# Patient Record
Sex: MALE | Race: WHITE | NOT HISPANIC OR LATINO | URBAN - METROPOLITAN AREA
[De-identification: names, ages, dates, MRNs, and addresses within clinical notes are randomized per-mention and may not be internally consistent; named-entity substitution may affect disease eponyms.]

---

## 2019-06-05 ENCOUNTER — EMERGENCY (EMERGENCY)
Facility: HOSPITAL | Age: 39
LOS: 0 days | Discharge: HOME | End: 2019-06-05
Attending: EMERGENCY MEDICINE
Payer: MEDICAID

## 2019-06-05 VITALS
OXYGEN SATURATION: 99 % | DIASTOLIC BLOOD PRESSURE: 77 MMHG | TEMPERATURE: 99 F | HEART RATE: 85 BPM | RESPIRATION RATE: 17 BRPM | SYSTOLIC BLOOD PRESSURE: 158 MMHG

## 2019-06-05 VITALS
HEIGHT: 68 IN | DIASTOLIC BLOOD PRESSURE: 90 MMHG | RESPIRATION RATE: 18 BRPM | TEMPERATURE: 99 F | HEART RATE: 89 BPM | OXYGEN SATURATION: 98 % | SYSTOLIC BLOOD PRESSURE: 159 MMHG | WEIGHT: 195.11 LBS

## 2019-06-05 DIAGNOSIS — F19.19 OTHER PSYCHOACTIVE SUBSTANCE ABUSE WITH UNSPECIFIED PSYCHOACTIVE SUBSTANCE-INDUCED DISORDER: ICD-10-CM

## 2019-06-05 DIAGNOSIS — M79.602 PAIN IN LEFT ARM: ICD-10-CM

## 2019-06-05 DIAGNOSIS — L03.114 CELLULITIS OF LEFT UPPER LIMB: ICD-10-CM

## 2019-06-05 LAB
ALBUMIN SERPL ELPH-MCNC: 2.7 G/DL — LOW (ref 3.3–5)
ALP SERPL-CCNC: 320 U/L — HIGH (ref 40–120)
ALT FLD-CCNC: 58 U/L — SIGNIFICANT CHANGE UP (ref 12–78)
ANION GAP SERPL CALC-SCNC: 9 MMOL/L — SIGNIFICANT CHANGE UP (ref 5–17)
APTT BLD: 28.8 SEC — SIGNIFICANT CHANGE UP (ref 27.5–36.3)
AST SERPL-CCNC: 25 U/L — SIGNIFICANT CHANGE UP (ref 15–37)
BASOPHILS # BLD AUTO: 0.02 K/UL — SIGNIFICANT CHANGE UP (ref 0–0.2)
BASOPHILS NFR BLD AUTO: 0.2 % — SIGNIFICANT CHANGE UP (ref 0–2)
BILIRUB SERPL-MCNC: 1 MG/DL — SIGNIFICANT CHANGE UP (ref 0.2–1.2)
BUN SERPL-MCNC: 40 MG/DL — HIGH (ref 7–23)
CALCIUM SERPL-MCNC: 8.3 MG/DL — LOW (ref 8.5–10.1)
CHLORIDE SERPL-SCNC: 113 MMOL/L — HIGH (ref 96–108)
CO2 SERPL-SCNC: 21 MMOL/L — LOW (ref 22–31)
CREAT SERPL-MCNC: 1.37 MG/DL — HIGH (ref 0.5–1.3)
CRP SERPL-MCNC: 10.28 MG/DL — HIGH (ref 0–0.4)
EOSINOPHIL # BLD AUTO: 0.02 K/UL — SIGNIFICANT CHANGE UP (ref 0–0.5)
EOSINOPHIL NFR BLD AUTO: 0.2 % — SIGNIFICANT CHANGE UP (ref 0–6)
ERYTHROCYTE [SEDIMENTATION RATE] IN BLOOD: 51 MM/HR — HIGH (ref 0–15)
GLUCOSE SERPL-MCNC: 112 MG/DL — HIGH (ref 70–99)
HCT VFR BLD CALC: 27.5 % — LOW (ref 39–50)
HGB BLD-MCNC: 9 G/DL — LOW (ref 13–17)
IMM GRANULOCYTES NFR BLD AUTO: 0.4 % — SIGNIFICANT CHANGE UP (ref 0–1.5)
INR BLD: 1.35 RATIO — HIGH (ref 0.88–1.16)
LACTATE SERPL-SCNC: 0.7 MMOL/L — SIGNIFICANT CHANGE UP (ref 0.7–2)
LYMPHOCYTES # BLD AUTO: 0.67 K/UL — LOW (ref 1–3.3)
LYMPHOCYTES # BLD AUTO: 7.4 % — LOW (ref 13–44)
MCHC RBC-ENTMCNC: 26.5 PG — LOW (ref 27–34)
MCHC RBC-ENTMCNC: 32.7 GM/DL — SIGNIFICANT CHANGE UP (ref 32–36)
MCV RBC AUTO: 81.1 FL — SIGNIFICANT CHANGE UP (ref 80–100)
MONOCYTES # BLD AUTO: 0.8 K/UL — SIGNIFICANT CHANGE UP (ref 0–0.9)
MONOCYTES NFR BLD AUTO: 8.9 % — SIGNIFICANT CHANGE UP (ref 2–14)
NEUTROPHILS # BLD AUTO: 7.48 K/UL — HIGH (ref 1.8–7.4)
NEUTROPHILS NFR BLD AUTO: 82.9 % — HIGH (ref 43–77)
NRBC # BLD: 0 /100 WBCS — SIGNIFICANT CHANGE UP (ref 0–0)
PLATELET # BLD AUTO: 180 K/UL — SIGNIFICANT CHANGE UP (ref 150–400)
POTASSIUM SERPL-MCNC: 4.3 MMOL/L — SIGNIFICANT CHANGE UP (ref 3.5–5.3)
POTASSIUM SERPL-SCNC: 4.3 MMOL/L — SIGNIFICANT CHANGE UP (ref 3.5–5.3)
PROT SERPL-MCNC: 6.4 GM/DL — SIGNIFICANT CHANGE UP (ref 6–8.3)
PROTHROM AB SERPL-ACNC: 15.3 SEC — HIGH (ref 10–12.9)
RBC # BLD: 3.39 M/UL — LOW (ref 4.2–5.8)
RBC # FLD: 12.3 % — SIGNIFICANT CHANGE UP (ref 10.3–14.5)
SODIUM SERPL-SCNC: 143 MMOL/L — SIGNIFICANT CHANGE UP (ref 135–145)
WBC # BLD: 9.03 K/UL — SIGNIFICANT CHANGE UP (ref 3.8–10.5)
WBC # FLD AUTO: 9.03 K/UL — SIGNIFICANT CHANGE UP (ref 3.8–10.5)

## 2019-06-05 PROCEDURE — 99284 EMERGENCY DEPT VISIT MOD MDM: CPT

## 2019-06-05 PROCEDURE — 73090 X-RAY EXAM OF FOREARM: CPT | Mod: 26,LT

## 2019-06-05 PROCEDURE — 73201 CT UPPER EXTREMITY W/DYE: CPT | Mod: 26,LT

## 2019-06-05 PROCEDURE — 93010 ELECTROCARDIOGRAM REPORT: CPT

## 2019-06-05 PROCEDURE — 73080 X-RAY EXAM OF ELBOW: CPT | Mod: 26,LT

## 2019-06-05 RX ORDER — PIPERACILLIN AND TAZOBACTAM 4; .5 G/20ML; G/20ML
3.38 INJECTION, POWDER, LYOPHILIZED, FOR SOLUTION INTRAVENOUS ONCE
Refills: 0 | Status: COMPLETED | OUTPATIENT
Start: 2019-06-05 | End: 2019-06-05

## 2019-06-05 RX ORDER — VANCOMYCIN HCL 1 G
1000 VIAL (EA) INTRAVENOUS ONCE
Refills: 0 | Status: COMPLETED | OUTPATIENT
Start: 2019-06-05 | End: 2019-06-05

## 2019-06-05 RX ORDER — SODIUM CHLORIDE 9 MG/ML
2000 INJECTION INTRAMUSCULAR; INTRAVENOUS; SUBCUTANEOUS ONCE
Refills: 0 | Status: COMPLETED | OUTPATIENT
Start: 2019-06-05 | End: 2019-06-05

## 2019-06-05 RX ORDER — TETANUS TOXOID, REDUCED DIPHTHERIA TOXOID AND ACELLULAR PERTUSSIS VACCINE, ADSORBED 5; 2.5; 8; 8; 2.5 [IU]/.5ML; [IU]/.5ML; UG/.5ML; UG/.5ML; UG/.5ML
0.5 SUSPENSION INTRAMUSCULAR ONCE
Refills: 0 | Status: COMPLETED | OUTPATIENT
Start: 2019-06-05 | End: 2019-06-05

## 2019-06-05 RX ORDER — AZTREONAM 2 G
1 VIAL (EA) INJECTION
Qty: 20 | Refills: 0
Start: 2019-06-05 | End: 2019-06-14

## 2019-06-05 RX ADMIN — PIPERACILLIN AND TAZOBACTAM 200 GRAM(S): 4; .5 INJECTION, POWDER, LYOPHILIZED, FOR SOLUTION INTRAVENOUS at 10:15

## 2019-06-05 RX ADMIN — SODIUM CHLORIDE 2000 MILLILITER(S): 9 INJECTION INTRAMUSCULAR; INTRAVENOUS; SUBCUTANEOUS at 10:18

## 2019-06-05 RX ADMIN — Medication 250 MILLIGRAM(S): at 11:16

## 2019-06-05 NOTE — ED PROVIDER NOTE - CONSTITUTIONAL, MLM
normal... Well appearing, well nourished, awake, alert, oriented to person, place, time/situation and in no apparent distress. Speaking in clear full sentences  no nasal flaring no shoulders retractions smiling appears comfortable sitting up in the stretcher

## 2019-06-05 NOTE — ED PROVIDER NOTE - PMH
No pertinent past medical history <<----- Click to add NO pertinent Past Medical History Substance abuse

## 2019-06-05 NOTE — ED ADULT NURSE NOTE - NSIMPLEMENTINTERV_GEN_ALL_ED
Implemented All Universal Safety Interventions:  Dawn to call system. Call bell, personal items and telephone within reach. Instruct patient to call for assistance. Room bathroom lighting operational. Non-slip footwear when patient is off stretcher. Physically safe environment: no spills, clutter or unnecessary equipment. Stretcher in lowest position, wheels locked, appropriate side rails in place.

## 2019-06-05 NOTE — ED ADULT NURSE NOTE - EXPLANATION OF PATIENT'S REASON FOR LEAVING
pt request to return another day for personal issues. Pt states he will return for IV antibiotics and admission.

## 2019-06-05 NOTE — ED ADULT NURSE NOTE - OBJECTIVE STATEMENT
pt c/o of left arm pain and swelling. pt states injured at work 3 days ago. pt states  he was working on a car when the powers collapsed on his arm. 1  cm puncture wound to left arm, swollen red, warm to touch, painful at site. pt denies fever chills.

## 2019-06-05 NOTE — ED PROVIDER NOTE - PHYSICAL EXAMINATION
Skin - +induration swelling erythematous to left proximal ventral aspect of the left fore arm 10 cm by 15 cm

## 2019-06-05 NOTE — ED PROVIDER NOTE - PROGRESS NOTE DETAILS
Pt now admits to use of iv heroin two bags three days ago.  Last use was three days ago. Pt has been alert and oriented x 3 pt refused to be admitted for left arm cellulitis and abscess. Pt sts he rather take oral antibiotics and will come back if symptoms persist or worsen. Pt is explained the importance to be admitted and risks ( including amputation and death ) of leaving hospital against resistance. Pt is fully capable of making medical decisions. Pt is advised to take oral antibiotics.

## 2019-06-05 NOTE — ED PROVIDER NOTE - OBJECTIVE STATEMENT
39 years old male walked in c/o pain swelling and redness to the left fore arm after being punctured by a clifford nail Last tetanus was 6 years ago. Pt dneie 39 years old male walked in c/o pain swelling and redness to the left fore arm after being punctured by a clifford nail Last tetanus was 6 years ago. Pt denies headache, dizziness, blurred visions, light sensitivities, focal/distal weakness or numbness, of left arm, cough, sob, chest pain, neck/back pain, nausea, vomiting, fever, chills, abd pain, dysuria or irregular bowel movements. Pt sts he has no past medical hx.

## 2019-06-06 ENCOUNTER — INPATIENT (INPATIENT)
Facility: HOSPITAL | Age: 39
LOS: 24 days | Discharge: HOME | End: 2019-07-01
Attending: INTERNAL MEDICINE | Admitting: INTERNAL MEDICINE
Payer: MEDICAID

## 2019-06-06 VITALS
WEIGHT: 195.11 LBS | OXYGEN SATURATION: 98 % | DIASTOLIC BLOOD PRESSURE: 99 MMHG | HEIGHT: 69 IN | SYSTOLIC BLOOD PRESSURE: 160 MMHG | HEART RATE: 111 BPM | TEMPERATURE: 98 F | RESPIRATION RATE: 16 BRPM

## 2019-06-06 DIAGNOSIS — L02.419 CUTANEOUS ABSCESS OF LIMB, UNSPECIFIED: ICD-10-CM

## 2019-06-06 DIAGNOSIS — L03.114 CELLULITIS OF LEFT UPPER LIMB: ICD-10-CM

## 2019-06-06 DIAGNOSIS — F17.200 NICOTINE DEPENDENCE, UNSPECIFIED, UNCOMPLICATED: ICD-10-CM

## 2019-06-06 DIAGNOSIS — F19.10 OTHER PSYCHOACTIVE SUBSTANCE ABUSE, UNCOMPLICATED: ICD-10-CM

## 2019-06-06 LAB
ALBUMIN SERPL ELPH-MCNC: 2.6 G/DL — LOW (ref 3.3–5)
ALP SERPL-CCNC: 354 U/L — HIGH (ref 40–120)
ALT FLD-CCNC: 47 U/L — SIGNIFICANT CHANGE UP (ref 12–78)
AMPHET UR-MCNC: NEGATIVE — SIGNIFICANT CHANGE UP
ANION GAP SERPL CALC-SCNC: 9 MMOL/L — SIGNIFICANT CHANGE UP (ref 5–17)
APPEARANCE UR: ABNORMAL
APTT BLD: 20.2 SEC — LOW (ref 28.5–37)
AST SERPL-CCNC: 21 U/L — SIGNIFICANT CHANGE UP (ref 15–37)
BACTERIA # UR AUTO: ABNORMAL
BARBITURATES UR SCN-MCNC: NEGATIVE — SIGNIFICANT CHANGE UP
BASOPHILS # BLD AUTO: 0 K/UL — SIGNIFICANT CHANGE UP (ref 0–0.2)
BASOPHILS NFR BLD AUTO: 0 % — SIGNIFICANT CHANGE UP (ref 0–2)
BENZODIAZ UR-MCNC: NEGATIVE — SIGNIFICANT CHANGE UP
BILIRUB SERPL-MCNC: 0.9 MG/DL — SIGNIFICANT CHANGE UP (ref 0.2–1.2)
BILIRUB UR-MCNC: ABNORMAL
BUN SERPL-MCNC: 36 MG/DL — HIGH (ref 7–23)
CALCIUM SERPL-MCNC: 8.7 MG/DL — SIGNIFICANT CHANGE UP (ref 8.5–10.1)
CHLORIDE SERPL-SCNC: 116 MMOL/L — HIGH (ref 96–108)
CO2 SERPL-SCNC: 18 MMOL/L — LOW (ref 22–31)
COCAINE METAB.OTHER UR-MCNC: POSITIVE — SIGNIFICANT CHANGE UP
COLOR SPEC: YELLOW — SIGNIFICANT CHANGE UP
CREAT SERPL-MCNC: 1.36 MG/DL — HIGH (ref 0.5–1.3)
DIFF PNL FLD: ABNORMAL
EOSINOPHIL # BLD AUTO: 0.17 K/UL — SIGNIFICANT CHANGE UP (ref 0–0.5)
EOSINOPHIL NFR BLD AUTO: 2 % — SIGNIFICANT CHANGE UP (ref 0–6)
ETHANOL SERPL-MCNC: <10 MG/DL — SIGNIFICANT CHANGE UP (ref 0–10)
GLUCOSE SERPL-MCNC: 119 MG/DL — HIGH (ref 70–99)
GLUCOSE UR QL: NEGATIVE MG/DL — SIGNIFICANT CHANGE UP
HCT VFR BLD CALC: 27.5 % — LOW (ref 39–50)
HGB BLD-MCNC: 9.1 G/DL — LOW (ref 13–17)
INR BLD: 1.26 RATIO — HIGH (ref 0.88–1.16)
KETONES UR-MCNC: ABNORMAL
LACTATE SERPL-SCNC: 1.1 MMOL/L — SIGNIFICANT CHANGE UP (ref 0.7–2)
LEUKOCYTE ESTERASE UR-ACNC: ABNORMAL
LYMPHOCYTES # BLD AUTO: 0.92 K/UL — LOW (ref 1–3.3)
LYMPHOCYTES # BLD AUTO: 11 % — LOW (ref 13–44)
MANUAL SMEAR VERIFICATION: SIGNIFICANT CHANGE UP
MCHC RBC-ENTMCNC: 26.7 PG — LOW (ref 27–34)
MCHC RBC-ENTMCNC: 33.1 GM/DL — SIGNIFICANT CHANGE UP (ref 32–36)
MCV RBC AUTO: 80.6 FL — SIGNIFICANT CHANGE UP (ref 80–100)
METHADONE UR-MCNC: NEGATIVE — SIGNIFICANT CHANGE UP
MONOCYTES # BLD AUTO: 0.08 K/UL — SIGNIFICANT CHANGE UP (ref 0–0.9)
MONOCYTES NFR BLD AUTO: 1 % — LOW (ref 2–14)
NEUTROPHILS # BLD AUTO: 7.16 K/UL — SIGNIFICANT CHANGE UP (ref 1.8–7.4)
NEUTROPHILS NFR BLD AUTO: 86 % — HIGH (ref 43–77)
NITRITE UR-MCNC: NEGATIVE — SIGNIFICANT CHANGE UP
NRBC # BLD: 0 /100 — SIGNIFICANT CHANGE UP (ref 0–0)
NRBC # BLD: SIGNIFICANT CHANGE UP /100 WBCS (ref 0–0)
OPIATES UR-MCNC: POSITIVE — SIGNIFICANT CHANGE UP
PCP SPEC-MCNC: SIGNIFICANT CHANGE UP
PCP UR-MCNC: NEGATIVE — SIGNIFICANT CHANGE UP
PH UR: 5 — SIGNIFICANT CHANGE UP (ref 5–8)
PLAT MORPH BLD: NORMAL — SIGNIFICANT CHANGE UP
PLATELET # BLD AUTO: 171 K/UL — SIGNIFICANT CHANGE UP (ref 150–400)
POTASSIUM SERPL-MCNC: 4.4 MMOL/L — SIGNIFICANT CHANGE UP (ref 3.5–5.3)
POTASSIUM SERPL-SCNC: 4.4 MMOL/L — SIGNIFICANT CHANGE UP (ref 3.5–5.3)
PROT SERPL-MCNC: 6.4 GM/DL — SIGNIFICANT CHANGE UP (ref 6–8.3)
PROT UR-MCNC: 100 MG/DL
PROTHROM AB SERPL-ACNC: 14.2 SEC — HIGH (ref 10–12.9)
RBC # BLD: 3.41 M/UL — LOW (ref 4.2–5.8)
RBC # FLD: 12.8 % — SIGNIFICANT CHANGE UP (ref 10.3–14.5)
RBC BLD AUTO: NORMAL — SIGNIFICANT CHANGE UP
RBC CASTS # UR COMP ASSIST: ABNORMAL /HPF (ref 0–4)
SODIUM SERPL-SCNC: 143 MMOL/L — SIGNIFICANT CHANGE UP (ref 135–145)
SP GR SPEC: 1.01 — SIGNIFICANT CHANGE UP (ref 1.01–1.02)
THC UR QL: POSITIVE — SIGNIFICANT CHANGE UP
UROBILINOGEN FLD QL: 8 MG/DL
WBC # BLD: 8.33 K/UL — SIGNIFICANT CHANGE UP (ref 3.8–10.5)
WBC # FLD AUTO: 8.33 K/UL — SIGNIFICANT CHANGE UP (ref 3.8–10.5)
WBC UR QL: ABNORMAL

## 2019-06-06 PROCEDURE — 93010 ELECTROCARDIOGRAM REPORT: CPT

## 2019-06-06 PROCEDURE — 93971 EXTREMITY STUDY: CPT | Mod: 26,LT

## 2019-06-06 PROCEDURE — 99223 1ST HOSP IP/OBS HIGH 75: CPT

## 2019-06-06 PROCEDURE — 99285 EMERGENCY DEPT VISIT HI MDM: CPT

## 2019-06-06 PROCEDURE — 88304 TISSUE EXAM BY PATHOLOGIST: CPT | Mod: 26

## 2019-06-06 RX ORDER — NICOTINE POLACRILEX 2 MG
1 GUM BUCCAL DAILY
Refills: 0 | Status: DISCONTINUED | OUTPATIENT
Start: 2019-06-06 | End: 2019-06-07

## 2019-06-06 RX ORDER — PIPERACILLIN AND TAZOBACTAM 4; .5 G/20ML; G/20ML
3.38 INJECTION, POWDER, LYOPHILIZED, FOR SOLUTION INTRAVENOUS EVERY 8 HOURS
Refills: 0 | Status: DISCONTINUED | OUTPATIENT
Start: 2019-06-06 | End: 2019-06-07

## 2019-06-06 RX ORDER — VANCOMYCIN HCL 1 G
1000 VIAL (EA) INTRAVENOUS ONCE
Refills: 0 | Status: COMPLETED | OUTPATIENT
Start: 2019-06-06 | End: 2019-06-06

## 2019-06-06 RX ORDER — ACETAMINOPHEN 500 MG
650 TABLET ORAL EVERY 6 HOURS
Refills: 0 | Status: DISCONTINUED | OUTPATIENT
Start: 2019-06-06 | End: 2019-06-07

## 2019-06-06 RX ORDER — VANCOMYCIN HCL 1 G
1000 VIAL (EA) INTRAVENOUS EVERY 12 HOURS
Refills: 0 | Status: DISCONTINUED | OUTPATIENT
Start: 2019-06-06 | End: 2019-06-07

## 2019-06-06 RX ORDER — SODIUM CHLORIDE 9 MG/ML
2000 INJECTION INTRAMUSCULAR; INTRAVENOUS; SUBCUTANEOUS ONCE
Refills: 0 | Status: COMPLETED | OUTPATIENT
Start: 2019-06-06 | End: 2019-06-06

## 2019-06-06 RX ORDER — PIPERACILLIN AND TAZOBACTAM 4; .5 G/20ML; G/20ML
3.38 INJECTION, POWDER, LYOPHILIZED, FOR SOLUTION INTRAVENOUS ONCE
Refills: 0 | Status: COMPLETED | OUTPATIENT
Start: 2019-06-06 | End: 2019-06-06

## 2019-06-06 RX ADMIN — Medication 1 PATCH: at 19:40

## 2019-06-06 RX ADMIN — PIPERACILLIN AND TAZOBACTAM 200 GRAM(S): 4; .5 INJECTION, POWDER, LYOPHILIZED, FOR SOLUTION INTRAVENOUS at 10:00

## 2019-06-06 RX ADMIN — SODIUM CHLORIDE 1000 MILLILITER(S): 9 INJECTION INTRAMUSCULAR; INTRAVENOUS; SUBCUTANEOUS at 09:40

## 2019-06-06 RX ADMIN — PIPERACILLIN AND TAZOBACTAM 3.38 GRAM(S): 4; .5 INJECTION, POWDER, LYOPHILIZED, FOR SOLUTION INTRAVENOUS at 11:05

## 2019-06-06 RX ADMIN — Medication 1 PATCH: at 13:46

## 2019-06-06 RX ADMIN — PIPERACILLIN AND TAZOBACTAM 25 GRAM(S): 4; .5 INJECTION, POWDER, LYOPHILIZED, FOR SOLUTION INTRAVENOUS at 19:52

## 2019-06-06 RX ADMIN — Medication 250 MILLIGRAM(S): at 11:20

## 2019-06-06 NOTE — ED ADULT TRIAGE NOTE - CHIEF COMPLAINT QUOTE
as per pt " I was here yesterday and d/c on abx for a left forearm wound. the wound is now 7cm by 8cm with redness, warmth, and firm to touch and oozing moderately of pinkish brown discharge.

## 2019-06-06 NOTE — H&P ADULT - ASSESSMENT
39 years old male with history of heroine abuse with cellultis failed after one days of outpatient oral antibiotics for admission for at least two days for intravenous antibiotics   IMPROVE VTE Individual Risk Assessment    RISK                                                          Points  [] Previous VTE                                           3  [] Thrombophilia                                        2  [] Lower limb paralysis                              2   [] Current Cancer                                       2   [] Immobilization > 24 hrs                        1  [] ICU/CCU stay > 24 hours                       1  [] Age > 60                                                   1    IMPROVE VTE Score:0

## 2019-06-06 NOTE — ED ADULT NURSE REASSESSMENT NOTE - NS ED NURSE REASSESS COMMENT FT1
Report given to receiving RN Sangeeta, pts history, current condition and reason for admission discussed, safety concerns addressed and reviewed, pt currently in stable condition, IV flushes for patency and site shows no signs or symptoms of infiltrate, dressing is clean dry and intact, pt is aware of plan of care. Pt education deemed successful at time of report after patient demonstrates successful teach back for proficiency.

## 2019-06-06 NOTE — ED ADULT NURSE NOTE - OBJECTIVE STATEMENT
Patient c/o of left forearm wound swelling with erythema that started Sunday and progressively became worse.  Patient denies pain.

## 2019-06-06 NOTE — H&P ADULT - PROBLEM SELECTOR PLAN 3
no withdrawal symptoms-  patient wants no discussion of this issue with anyone else including family

## 2019-06-06 NOTE — H&P ADULT - NSHPPHYSICALEXAM_GEN_ALL_CORE
ICU Vital Signs Last 24 Hrs  T(C): 37.1 (06 Jun 2019 11:25), Max: 37.2 (05 Jun 2019 13:46)  T(F): 98.7 (06 Jun 2019 11:25), Max: 99 (05 Jun 2019 13:46)  HR: 88 (06 Jun 2019 11:25) (85 - 111)  BP: 170/86 (06 Jun 2019 11:25) (158/77 - 170/86)  BP(mean): --  ABP: --  ABP(mean): --  RR: 18 (06 Jun 2019 11:25) (16 - 18)  SpO2: 99% (06 Jun 2019 11:25) (98% - 99%)  GENERAL: NAD well-developed  HEAD:  Atraumatic, Normocephalic  EYES: EOMI, PERRLA, conjunctiva and sclera clear  ENMT: No tonsillar erythema, exudates, or enlargement; Moist mucous membranes, Good dentition, No lesions  NECK: Supple, No JVD, Normal thyroid  NERVOUS SYSTEM:  Alert & Oriented X3, Good concentration; Motor Strength 5/5 B/L upper and lower extremities; DTRs 2+ intact and symmetric  CHEST/LUNG: Clear to percussion bilaterally; No rales, rhonchi, wheezing, or rubs  HEART: Regular rate and rhythm; No murmurs, rubs, or gallops  ABDOMEN: Soft, Nontender, Nondistended; Bowel sounds present  EXTREMITIES:  2+ Peripheral Pulses, No clubbing, cyanosis, or edema  LYMPH: No lymphadenopathy   SKIN: No rashes or lesions ICU Vital Signs Last 24 Hrs  T(C): 37.1 (06 Jun 2019 11:25), Max: 37.2 (05 Jun 2019 13:46)  T(F): 98.7 (06 Jun 2019 11:25), Max: 99 (05 Jun 2019 13:46)  HR: 88 (06 Jun 2019 11:25) (85 - 111)  BP: 170/86 (06 Jun 2019 11:25) (158/77 - 170/86)  BP(mean): --  ABP: --  ABP(mean): --  RR: 18 (06 Jun 2019 11:25) (16 - 18)  SpO2: 99% (06 Jun 2019 11:25) (98% - 99%)  GENERAL: NAD well-developed  HEAD:  Atraumatic, Normocephalic  EYES: EOMI, PERRLA, conjunctiva and sclera clear  ENMT: No tonsillar erythema, exudates, or enlargement; Moist mucous membranes, Good dentition, No lesions  NECK: Supple, No JVD, Normal thyroid  NERVOUS SYSTEM:  Alert & Oriented X3, Good concentration; Motor Strength 5/5 B/L upper and lower extremities; DTRs 2+ intact and symmetric  CHEST/LUNG: Clear to percussion bilaterally; No rales, rhonchi, wheezing, or rubs  HEART: Regular rate and rhythm; No murmurs, rubs, or gallops  ABDOMEN: Soft, Nontender, Nondistended; Bowel sounds present  EXTREMITIES:  large tense swelling with small pustule area to left forearm   LYMPH: No lymphadenopathy   SKIN: No rashes or lesions

## 2019-06-06 NOTE — ED PROVIDER NOTE - OBJECTIVE STATEMENT
39 year old male presents today for follow up, pt presents with a draining wound on his left forearm, pt was seen yesterday but did not want to stay, he did want to try oral antibiotics, he does admit to heroin use prior to his symptoms, he did have fever on Saturday and Sunday then swelling which worsened, he denies tenderness +drainage

## 2019-06-06 NOTE — H&P ADULT - NSHPREVIEWOFSYSTEMS_GEN_ALL_CORE
CONSTITUTIONAL: No fever, weight loss, or fatigue  EYES: No eye pain, visual disturbances, or discharge  ENMT:  No difficulty hearing, tinnitus, vertigo; No sinus or throat pain  NECK: No pain or stiffness  RESPIRATORY: No cough, wheezing, chills or hemoptysis; No shortness of breath  CARDIOVASCULAR: No chest pain, palpitations, dizziness, or leg swelling  GASTROINTESTINAL: No abdominal or epigastric pain. No nausea, vomiting, or hematemesis; No diarrhea or constipation. No melena or hematochezia.  GENITOURINARY: No dysuria, frequency, hematuria, or incontinence  NEUROLOGICAL: No headaches, memory loss, loss of strength, numbness, or tremors  SKIN: No itching, burning, rashes, or lesions   LYMPH NODES: No enlarged glands  ENDOCRINE: No heat or cold intolerance; No hair loss  MUSCULOSKELETAL: No joint pain or swelling; No muscle, back, or extremity pain  PSYCHIATRIC: No depression, anxiety, mood swings, or difficulty sleeping  HEME/LYMPH: No easy bruising, or bleeding gums  ALLERGY AND IMMUNOLOGIC: No hives or eczema CONSTITUTIONAL:  fever two days ago no  weight loss, or fatigue  EYES: No eye pain, visual disturbances, or discharge  ENMT:  No difficulty hearing, tinnitus, vertigo; No sinus or throat pain  NECK: No pain or stiffness  RESPIRATORY: No cough, wheezing, chills or hemoptysis; No shortness of breath  CARDIOVASCULAR: No chest pain, palpitations, dizziness, or leg swelling  GASTROINTESTINAL: No abdominal or epigastric pain. No nausea, vomiting, or hematemesis; No diarrhea or constipation. No melena or hematochezia.  GENITOURINARY: No dysuria, frequency, hematuria, or incontinence  NEUROLOGICAL: No headaches, memory loss, loss of strength, numbness, or tremors  SKIN: No itching, burning, rashes, or lesions   LYMPH NODES: No enlarged glands  ENDOCRINE: No heat or cold intolerance; No hair loss  MUSCULOSKELETAL: swelling to left forearm   PSYCHIATRIC: No depression, anxiety, mood swings, or difficulty sleeping  HEME/LYMPH: No easy bruising, or bleeding gums  ALLERGY AND IMMUNOLOGIC: No hives or eczema

## 2019-06-06 NOTE — ED PROVIDER NOTE - CARE PLAN
Principal Discharge DX:	Abscess of left axilla Principal Discharge DX:	Abscess of upper limb  Secondary Diagnosis:	Cellulitis of left upper extremity

## 2019-06-06 NOTE — CONSULT NOTE ADULT - SUBJECTIVE AND OBJECTIVE BOX
CC:  Patient is a 39y old  Male who presents with a chief complaint of cellultis (2019 12:25)      HPI:  39 year old male presents today for follow up, pt presents with a draining wound on his left forearm, pt was seen yesterday but did not want to stay, he did want to try oral antibiotics, he does admit to heroin use prior to his symptoms, he did have fever on Saturday and  then swelling which worsened, he denies tenderness +drainage of pus today - denies local pain - OF note -## he does not want anybody especially his fiance to know about his drug use## (2019 12:25)      PAST MEDICAL & SURGICAL HISTORY:  Substance abuse  No significant past surgical history      Allergies    No Known Allergies    Intolerances        SOCIAL HISTORY          Smoking: Yes [ ]  No [ ]   ______pk yrs          ETOH  Yes [ ]  No [ ]  Social [ ]          DRUGS:  Yes [ ]  No [ ]  if so what______________    FAMILY HISTORY:  No pertinent family history in first degree relatives      MEDICATIONS  (STANDING):  nicotine - 21 mG/24Hr(s) Patch 1 patch Transdermal daily  piperacillin/tazobactam IVPB. 3.375 Gram(s) IV Intermittent every 8 hours  vancomycin  IVPB 1000 milliGRAM(s) IV Intermittent every 12 hours    MEDICATIONS  (PRN):  acetaminophen   Tablet .. 650 milliGRAM(s) Oral every 6 hours PRN Temp greater or equal to 38C (100.4F), Mild Pain (1 - 3)         Review of systems:  General:  no fever or chills  Pulmonary:  no SOB  Cardiac:  denies chest pain, palpitations  GI:  no nausea, vomitting, or abddominal pain  :  no increase frequency, or burning  Heme:  no easy bruising with minor trauma  Musculoskeletal:  No history of back pain or trauma  Skin:  no history of rashes, injury, trauma  Neuro:  no weakness         Vital Signs Last 24 Hrs  T(C): 36.7 (2019 18:34), Max: 37.1 (2019 11:25)  T(F): 98 (2019 18:34), Max: 98.7 (2019 11:25)  HR: 94 (2019 18:34) (66 - 111)  BP: 165/96 (2019 18:34) (152/92 - 170/86)  BP(mean): --  RR: 17 (2019 18:34) (16 - 19)  SpO2: 94% (2019 18:34) (94% - 99%)    Physical Exam:    General:    no distress  Extremities:    Skin:          LABS:                        9.1    8.33  )-----------( 171      ( 2019 10:00 )             27.5         143  |  116<H>  |  36<H>  ----------------------------<  119<H>  4.4   |  18<L>  |  1.36<H>    Ca    8.7      2019 10:00    TPro  6.4  /  Alb  2.6<L>  /  TBili  0.9  /  DBili  x   /  AST  21  /  ALT  47  /  AlkPhos  354<H>      PT/INR - ( 2019 10:00 )   PT: 14.2 sec;   INR: 1.26 ratio         PTT - ( 2019 10:00 )  PTT:20.2 sec  Urinalysis Basic - ( 2019 18:37 )    Color: Yellow / Appearance: Slightly Turbid / S.015 / pH: x  Gluc: x / Ketone: Trace  / Bili: Small / Urobili: 8 mg/dL   Blood: x / Protein: 100 mg/dL / Nitrite: Negative   Leuk Esterase: Small / RBC: 25-50 /HPF / WBC 26-50   Sq Epi: x / Non Sq Epi: x / Bacteria: Many        RADIOLOGY & ADDITIONAL STUDIES:  EXAM:  US DPLX [facility] EXT VEINS LTD LT                            PROCEDURE DATE:  2019          INTERPRETATION:  CLINICAL INFORMATION: Left arm swelling    COMPARISON: None available.    TECHNIQUE: Duplex sonography of the LEFT UPPER extremity with color and   spectral Doppler, with and without compression.      FINDINGS:    The left internal jugular, subclavian, axillary and brachial veins are   patent and compressible where applicable.  The basilic and cephalic veins   (superficial veins)are patent and without thrombus.     Doppler examination shows normal spontaneous and phasic flow.    IMPRESSION:     No evidence of left upper extremity deep venous thrombosis.                    KATIE PRINCE M.D., ATTENDING RADIOLOGIST  This document has been electronically signed. 2019 10:41AM  PROCEDURE DATE:  2019          INTERPRETATION:    CT OF THE LEFT ELBOW WITH CONTRAST.    CLINICAL INFORMATION: Left proximal forearm swelling. Evaluate for   abscess.  TECHNIQUE: Axial CT images were obtained of the left elbow and proximal   forearm with coronal and sagittal reconstructions. 80 cc of Omnipaque 350   was administered intravenously.    FINDINGS:    Within the volar subcutaneous soft tissues at the level of the proximal   forearm and interposed between the brachioradialis muscle and the   pronator teres there is a septated peripherally enhancing fluid   collection that communicates from the skin to the superficial fascial   plane of the above-described musculature consistent with a soft tissue   abscess. This measures 3.7 x 4.0 in cross-section and approximately 6 cm   in long axis. There is adjacent circumferential subcutaneous soft tissue   edema and skin thickening with edema. Prominent posterior subcutaneous   soft tissue edema is seen at the elbow and distal humerus. Although there   is perimuscular soft tissue edema, there is no discrete intramuscular   abscess.    The osseous structures are intact without CT evidence of osteomyelitis.   The elbow articulations are normal in appearance. There is no elbow joint   effusion.    IMPRESSION:    Extensive circumferential subcutaneous soft tissue edema with a   peripherally enhancing and septated fluid collection within the   subcutaneous soft tissues at the volar aspect of the proximal forearm   interposed between the brachioradialis muscle and the pronator teres   consistent with a soft tissue abscess.                  Risks, benefits, and alternatives to treatment discussed. All questions answered with understanding.    Procedure Performed:  (  )Yes     (  )No  Name of Procedure:      [  ]Debridement     [  ]I&D    [  ]Laceration Repair     [  ]Other:  (  )partial thickness     (  )full thickness     (  )subcutaneous     (  )muscle/tendon     (  )bone  (  )sharp     (  )surgical CC:  Patient is a 39y old  Male who presents with a chief complaint of cellultis (2019 12:25)  RHD    HPI:  39 year old male presents today for follow up, pt presents with a draining wound on his left forearm, pt was seen yesterday but did not want to stay, he did want to try oral antibiotics, he does admit to heroin use prior to his symptoms, he did have fever on Saturday and  then swelling which worsened, he denies tenderness +drainage of pus today - denies local pain - OF note -## he does not want anybody especially his fiance to know about his drug use## (2019 12:25)      PAST MEDICAL & SURGICAL HISTORY:  Substance abuse  No significant past surgical history      Allergies    No Known Allergies    Intolerances        SOCIAL HISTORY          Smoking: Yes [ ]  No [ ]   ______pk yrs          ETOH  Yes [ ]  No [ ]  Social [ ]          DRUGS:  Yes [ ]  No [ ]  if so what______________    FAMILY HISTORY:  No pertinent family history in first degree relatives      MEDICATIONS  (STANDING):  nicotine - 21 mG/24Hr(s) Patch 1 patch Transdermal daily  piperacillin/tazobactam IVPB. 3.375 Gram(s) IV Intermittent every 8 hours  vancomycin  IVPB 1000 milliGRAM(s) IV Intermittent every 12 hours    MEDICATIONS  (PRN):  acetaminophen   Tablet .. 650 milliGRAM(s) Oral every 6 hours PRN Temp greater or equal to 38C (100.4F), Mild Pain (1 - 3)         Review of systems:  General:  no fever or chills  Pulmonary:  no SOB  Cardiac:  denies chest pain, palpitations  GI:  no nausea, vomitting, or abddominal pain  :  no increase frequency, or burning  Heme:  no easy bruising with minor trauma  Musculoskeletal:  No history of back pain or trauma  Skin:  no history of rashes, injury, trauma  Neuro:  no weakness         Vital Signs Last 24 Hrs  T(C): 36.7 (2019 18:34), Max: 37.1 (2019 11:25)  T(F): 98 (2019 18:34), Max: 98.7 (2019 11:25)  HR: 94 (2019 18:34) (66 - 111)  BP: 165/96 (2019 18:34) (152/92 - 170/86)  BP(mean): --  RR: 17 (2019 18:34) (16 - 19)  SpO2: 94% (2019 18:34) (94% - 99%)    Physical Exam:    General:    no distress  Extremities:  fullness left forearm with draining wound proximally, full ROM and neurovascularly intact          LABS:                        9.1    8.33  )-----------( 171      ( 2019 10:00 )             27.5         143  |  116<H>  |  36<H>  ----------------------------<  119<H>  4.4   |  18<L>  |  1.36<H>    Ca    8.7      2019 10:00    TPro  6.4  /  Alb  2.6<L>  /  TBili  0.9  /  DBili  x   /  AST  21  /  ALT  47  /  AlkPhos  354<H>      PT/INR - ( 2019 10:00 )   PT: 14.2 sec;   INR: 1.26 ratio         PTT - ( 2019 10:00 )  PTT:20.2 sec  Urinalysis Basic - ( 2019 18:37 )    Color: Yellow / Appearance: Slightly Turbid / S.015 / pH: x  Gluc: x / Ketone: Trace  / Bili: Small / Urobili: 8 mg/dL   Blood: x / Protein: 100 mg/dL / Nitrite: Negative   Leuk Esterase: Small / RBC: 25-50 /HPF / WBC 26-50   Sq Epi: x / Non Sq Epi: x / Bacteria: Many        RADIOLOGY & ADDITIONAL STUDIES:  EXAM:  US DPLX UPR EXT VEINS LTD LT                            PROCEDURE DATE:  2019          INTERPRETATION:  CLINICAL INFORMATION: Left arm swelling    COMPARISON: None available.    TECHNIQUE: Duplex sonography of the LEFT UPPER extremity with color and   spectral Doppler, with and without compression.      FINDINGS:    The left internal jugular, subclavian, axillary and brachial veins are   patent and compressible where applicable.  The basilic and cephalic veins   (superficial veins)are patent and without thrombus.     Doppler examination shows normal spontaneous and phasic flow.    IMPRESSION:     No evidence of left upper extremity deep venous thrombosis.                    KATIE PRINCE M.D., ATTENDING RADIOLOGIST  This document has been electronically signed. 2019 10:41AM  PROCEDURE DATE:  2019          INTERPRETATION:    CT OF THE LEFT ELBOW WITH CONTRAST.    CLINICAL INFORMATION: Left proximal forearm swelling. Evaluate for   abscess.  TECHNIQUE: Axial CT images were obtained of the left elbow and proximal   forearm with coronal and sagittal reconstructions. 80 cc of Omnipaque 350   was administered intravenously.    FINDINGS:    Within the volar subcutaneous soft tissues at the level of the proximal   forearm and interposed between the brachioradialis muscle and the   pronator teres there is a septated peripherally enhancing fluid   collection that communicates from the skin to the superficial fascial   plane of the above-described musculature consistent with a soft tissue   abscess. This measures 3.7 x 4.0 in cross-section and approximately 6 cm   in long axis. There is adjacent circumferential subcutaneous soft tissue   edema and skin thickening with edema. Prominent posterior subcutaneous   soft tissue edema is seen at the elbow and distal humerus. Although there   is perimuscular soft tissue edema, there is no discrete intramuscular   abscess.    The osseous structures are intact without CT evidence of osteomyelitis.   The elbow articulations are normal in appearance. There is no elbow joint   effusion.    IMPRESSION:    Extensive circumferential subcutaneous soft tissue edema with a   peripherally enhancing and septated fluid collection within the   subcutaneous soft tissues at the volar aspect of the proximal forearm   interposed between the brachioradialis muscle and the pronator teres   consistent with a soft tissue abscess.                  Risks, benefits, and alternatives to treatment discussed. All questions answered with understanding.    Procedure Performed:  (  )Yes     (  x)No  Name of Procedure:      [  ]Debridement     [  ]I&D    [  ]Laceration Repair     [  ]Other:  (  )partial thickness     (  )full thickness     (  )subcutaneous     (  )muscle/tendon     (  )bone  (  )sharp     (  )surgical

## 2019-06-06 NOTE — CONSULT NOTE ADULT - ASSESSMENT
Draining wound left forearm with CT 6/5 showing [id number] abscess  -Iand D in OR in am  -npo after midnight  -RBA explained

## 2019-06-06 NOTE — PATIENT PROFILE ADULT - PACKS YRS CALCULATION
Problem: Pain  Goal: #Acceptable pain level achieved/maintained at rest using NRS/Faces  This goal is used for patients who can self-report.  Acceptable means the level is at or below the identified comfort/function goal.   Outcome: Outcome Met, Continue evaluating goal progress toward completion  Pt denies any pain.        15

## 2019-06-07 PROBLEM — F19.10 OTHER PSYCHOACTIVE SUBSTANCE ABUSE, UNCOMPLICATED: Chronic | Status: ACTIVE | Noted: 2019-06-05

## 2019-06-07 LAB
ANION GAP SERPL CALC-SCNC: 8 MMOL/L — SIGNIFICANT CHANGE UP (ref 5–17)
BUN SERPL-MCNC: 31 MG/DL — HIGH (ref 7–23)
CALCIUM SERPL-MCNC: 8.6 MG/DL — SIGNIFICANT CHANGE UP (ref 8.5–10.1)
CHLORIDE SERPL-SCNC: 115 MMOL/L — HIGH (ref 96–108)
CO2 SERPL-SCNC: 21 MMOL/L — LOW (ref 22–31)
CREAT SERPL-MCNC: 1.19 MG/DL — SIGNIFICANT CHANGE UP (ref 0.5–1.3)
CULTURE RESULTS: NO GROWTH — SIGNIFICANT CHANGE UP
GLUCOSE SERPL-MCNC: 94 MG/DL — SIGNIFICANT CHANGE UP (ref 70–99)
HCT VFR BLD CALC: 24.7 % — LOW (ref 39–50)
HGB BLD-MCNC: 7.9 G/DL — LOW (ref 13–17)
HIV 1 & 2 AB SERPL IA.RAPID: SIGNIFICANT CHANGE UP
MCHC RBC-ENTMCNC: 26.4 PG — LOW (ref 27–34)
MCHC RBC-ENTMCNC: 32 GM/DL — SIGNIFICANT CHANGE UP (ref 32–36)
MCV RBC AUTO: 82.6 FL — SIGNIFICANT CHANGE UP (ref 80–100)
METHOD TYPE: SIGNIFICANT CHANGE UP
MRSA SPEC QL CULT: SIGNIFICANT CHANGE UP
NRBC # BLD: 0 /100 WBCS — SIGNIFICANT CHANGE UP (ref 0–0)
PLATELET # BLD AUTO: 173 K/UL — SIGNIFICANT CHANGE UP (ref 150–400)
POTASSIUM SERPL-MCNC: 4.3 MMOL/L — SIGNIFICANT CHANGE UP (ref 3.5–5.3)
POTASSIUM SERPL-SCNC: 4.3 MMOL/L — SIGNIFICANT CHANGE UP (ref 3.5–5.3)
RBC # BLD: 2.99 M/UL — LOW (ref 4.2–5.8)
RBC # FLD: 12.8 % — SIGNIFICANT CHANGE UP (ref 10.3–14.5)
SODIUM SERPL-SCNC: 144 MMOL/L — SIGNIFICANT CHANGE UP (ref 135–145)
SPECIMEN SOURCE: SIGNIFICANT CHANGE UP
WBC # BLD: 3.04 K/UL — LOW (ref 3.8–10.5)
WBC # FLD AUTO: 3.04 K/UL — LOW (ref 3.8–10.5)

## 2019-06-07 PROCEDURE — 99233 SBSQ HOSP IP/OBS HIGH 50: CPT

## 2019-06-07 PROCEDURE — 93306 TTE W/DOPPLER COMPLETE: CPT | Mod: 26

## 2019-06-07 RX ORDER — LANOLIN ALCOHOL/MO/W.PET/CERES
3 CREAM (GRAM) TOPICAL ONCE
Refills: 0 | Status: COMPLETED | OUTPATIENT
Start: 2019-06-07 | End: 2019-06-07

## 2019-06-07 RX ORDER — ACETAMINOPHEN 500 MG
650 TABLET ORAL EVERY 6 HOURS
Refills: 0 | Status: DISCONTINUED | OUTPATIENT
Start: 2019-06-07 | End: 2019-07-01

## 2019-06-07 RX ORDER — HYDROMORPHONE HYDROCHLORIDE 2 MG/ML
1 INJECTION INTRAMUSCULAR; INTRAVENOUS; SUBCUTANEOUS
Refills: 0 | Status: DISCONTINUED | OUTPATIENT
Start: 2019-06-07 | End: 2019-06-07

## 2019-06-07 RX ORDER — VANCOMYCIN HCL 1 G
1000 VIAL (EA) INTRAVENOUS EVERY 8 HOURS
Refills: 0 | Status: DISCONTINUED | OUTPATIENT
Start: 2019-06-07 | End: 2019-06-07

## 2019-06-07 RX ORDER — SODIUM CHLORIDE 9 MG/ML
1000 INJECTION, SOLUTION INTRAVENOUS
Refills: 0 | Status: DISCONTINUED | OUTPATIENT
Start: 2019-06-07 | End: 2019-06-07

## 2019-06-07 RX ADMIN — PIPERACILLIN AND TAZOBACTAM 25 GRAM(S): 4; .5 INJECTION, POWDER, LYOPHILIZED, FOR SOLUTION INTRAVENOUS at 01:18

## 2019-06-07 RX ADMIN — Medication 250 MILLIGRAM(S): at 12:16

## 2019-06-07 RX ADMIN — PIPERACILLIN AND TAZOBACTAM 25 GRAM(S): 4; .5 INJECTION, POWDER, LYOPHILIZED, FOR SOLUTION INTRAVENOUS at 09:21

## 2019-06-07 RX ADMIN — Medication 650 MILLIGRAM(S): at 22:40

## 2019-06-07 RX ADMIN — Medication 3 MILLIGRAM(S): at 23:12

## 2019-06-07 RX ADMIN — Medication 650 MILLIGRAM(S): at 21:58

## 2019-06-07 RX ADMIN — Medication 1 PATCH: at 12:59

## 2019-06-07 RX ADMIN — Medication 1 PATCH: at 07:50

## 2019-06-07 RX ADMIN — Medication 1 PATCH: at 12:12

## 2019-06-07 RX ADMIN — Medication 1 PATCH: at 21:57

## 2019-06-07 RX ADMIN — Medication 250 MILLIGRAM(S): at 00:06

## 2019-06-07 NOTE — PROGRESS NOTE ADULT - ASSESSMENT
39 years old male with history of heroine abuse with cellultis failed after one days of outpatient oral antibiotics for admission for at least two days for intravenous antibiotics . pt has grma pos cocci in clusters in blood and admits ot going for bachelor party last week. concern is for IE

## 2019-06-07 NOTE — PROGRESS NOTE ADULT - SUBJECTIVE AND OBJECTIVE BOX
INTERVAL HPI/OVERNIGHT EVENTS:   Patient seen and examined. pt has left arm swelling and abscess requirng ID but he also has gram pos cocci in clusters and requires HIV, echo, repeat blood cx, ID eval  pt says that he went partying last week  for a friends bachelor party    MEDICATIONS  (STANDING):  nicotine - 21 mG/24Hr(s) Patch 1 patch Transdermal daily  piperacillin/tazobactam IVPB. 3.375 Gram(s) IV Intermittent every 8 hours  vancomycin  IVPB 1000 milliGRAM(s) IV Intermittent every 12 hours    MEDICATIONS  (PRN):  acetaminophen   Tablet .. 650 milliGRAM(s) Oral every 6 hours PRN Temp greater or equal to 38C (100.4F), Mild Pain (1 - 3)      REVIEW OF SYSTEMS:  See HPI,  all others negative    PHYSICAL EXAM:  Vital Signs Last 24 Hrs  T(C): 37.1 (2019 10:54), Max: 37.1 (2019 05:16)  T(F): 98.7 (2019 10:54), Max: 98.7 (2019 05:16)  HR: 76 (2019 10:54) (68 - 94)  BP: 141/80 (2019 10:54) (137/69 - 165/96)  BP(mean): --  RR: 18 (2019 10:54) (17 - 18)  SpO2: 98% (2019 10:54) (94% - 99%)    GENERAL: NAD, well-groomed, well-developed, awake, alert, oriented x 3, fluent and coherent speech  HEAD:  Atraumatic, Normocephalic  EYES: EOMI, PERRLA, conjunctiva and sclera clear  ENMT: No tonsillar erythema, exudates, or enlargement; Moist mucous membranes, Good dentition, No lesions  NECK: Supple, No JVD, No Cervical LAD, No thyromegaly, No thyroid nodules felt  NERVOUS SYSTEM:  Good concentration; Moving all 4 extremities; No gross sensory deficits, No facial droop  CHEST WALL: No masses  CHEST/LUNG: Clear to auscultation bilaterally; No rales, rhonchi, wheezing, or rubs  HEART: Regular rate and rhythm; No murmurs, rubs, or gallops  ABDOMEN: Soft, Nontender, Nondistended, Bowel sounds present, No palpable masses or organomegaly, No bruits  EXTREMITIES:  left arm swellign with fluctutation  LYMPH: No lymphadenopathy  SKIN: No rashes or lesions    LABS:                        7.9    3.04  )-----------( 173      ( 2019 07:05 )             24.7     2019 07:05    144    |  115    |  31     ----------------------------<  94     4.3     |  21     |  1.19     Ca    8.6        2019 07:05      PT/INR - ( 2019 10:00 )   PT: 14.2 sec;   INR: 1.26 ratio         PTT - ( 2019 10:00 )  PTT:20.2 sec  Urinalysis Basic - ( 2019 18:37 )    Color: Yellow / Appearance: Slightly Turbid / S.015 / pH: x  Gluc: x / Ketone: Trace  / Bili: Small / Urobili: 8 mg/dL   Blood: x / Protein: 100 mg/dL / Nitrite: Negative   Leuk Esterase: Small / RBC: 25-50 /HPF / WBC 26-50   Sq Epi: x / Non Sq Epi: x / Bacteria: Many         RADIOLOGY & ADDITIONAL TESTS:

## 2019-06-07 NOTE — PROGRESS NOTE ADULT - PROBLEM SELECTOR PLAN 1
pt has bacteremai with gram pos cocci in clusters but no spsis  concern is for IE so will get echo  cont vanc and zosyn  ID ocnsulted  HIV ordered  pt will need I an d d

## 2019-06-07 NOTE — PROGRESS NOTE ADULT - SUBJECTIVE AND OBJECTIVE BOX
39 year of male RHD for I and D left forearm abscess, operative limb marked, informed consent given questions answered

## 2019-06-08 LAB
-  CANDIDA PARAPSILOSIS: SIGNIFICANT CHANGE UP
ANION GAP SERPL CALC-SCNC: 7 MMOL/L — SIGNIFICANT CHANGE UP (ref 5–17)
ANION GAP SERPL CALC-SCNC: 8 MMOL/L — SIGNIFICANT CHANGE UP (ref 5–17)
BASOPHILS # BLD AUTO: 0.02 K/UL — SIGNIFICANT CHANGE UP (ref 0–0.2)
BASOPHILS NFR BLD AUTO: 0.4 % — SIGNIFICANT CHANGE UP (ref 0–2)
BUN SERPL-MCNC: 31 MG/DL — HIGH (ref 7–23)
BUN SERPL-MCNC: 35 MG/DL — HIGH (ref 7–23)
CALCIUM SERPL-MCNC: 8.3 MG/DL — LOW (ref 8.5–10.1)
CALCIUM SERPL-MCNC: 8.4 MG/DL — LOW (ref 8.5–10.1)
CHLORIDE SERPL-SCNC: 115 MMOL/L — HIGH (ref 96–108)
CHLORIDE SERPL-SCNC: 118 MMOL/L — HIGH (ref 96–108)
CO2 SERPL-SCNC: 19 MMOL/L — LOW (ref 22–31)
CO2 SERPL-SCNC: 22 MMOL/L — SIGNIFICANT CHANGE UP (ref 22–31)
CREAT SERPL-MCNC: 1.22 MG/DL — SIGNIFICANT CHANGE UP (ref 0.5–1.3)
CREAT SERPL-MCNC: 1.22 MG/DL — SIGNIFICANT CHANGE UP (ref 0.5–1.3)
CULTURE RESULTS: SIGNIFICANT CHANGE UP
EOSINOPHIL # BLD AUTO: 0.08 K/UL — SIGNIFICANT CHANGE UP (ref 0–0.5)
EOSINOPHIL NFR BLD AUTO: 1.7 % — SIGNIFICANT CHANGE UP (ref 0–6)
GLUCOSE SERPL-MCNC: 90 MG/DL — SIGNIFICANT CHANGE UP (ref 70–99)
GLUCOSE SERPL-MCNC: 96 MG/DL — SIGNIFICANT CHANGE UP (ref 70–99)
HCT VFR BLD CALC: 27.8 % — LOW (ref 39–50)
HCT VFR BLD CALC: 28.4 % — LOW (ref 39–50)
HGB BLD-MCNC: 8.7 G/DL — LOW (ref 13–17)
HGB BLD-MCNC: 9 G/DL — LOW (ref 13–17)
IMM GRANULOCYTES NFR BLD AUTO: 0.6 % — SIGNIFICANT CHANGE UP (ref 0–1.5)
LYMPHOCYTES # BLD AUTO: 1.48 K/UL — SIGNIFICANT CHANGE UP (ref 1–3.3)
LYMPHOCYTES # BLD AUTO: 31.1 % — SIGNIFICANT CHANGE UP (ref 13–44)
MCHC RBC-ENTMCNC: 25.8 PG — LOW (ref 27–34)
MCHC RBC-ENTMCNC: 26.1 PG — LOW (ref 27–34)
MCHC RBC-ENTMCNC: 31.3 GM/DL — LOW (ref 32–36)
MCHC RBC-ENTMCNC: 31.7 GM/DL — LOW (ref 32–36)
MCV RBC AUTO: 82.3 FL — SIGNIFICANT CHANGE UP (ref 80–100)
MCV RBC AUTO: 82.5 FL — SIGNIFICANT CHANGE UP (ref 80–100)
METHOD TYPE: SIGNIFICANT CHANGE UP
MONOCYTES # BLD AUTO: 0.53 K/UL — SIGNIFICANT CHANGE UP (ref 0–0.9)
MONOCYTES NFR BLD AUTO: 11.1 % — SIGNIFICANT CHANGE UP (ref 2–14)
NEUTROPHILS # BLD AUTO: 2.62 K/UL — SIGNIFICANT CHANGE UP (ref 1.8–7.4)
NEUTROPHILS NFR BLD AUTO: 55.1 % — SIGNIFICANT CHANGE UP (ref 43–77)
NRBC # BLD: 0 /100 WBCS — SIGNIFICANT CHANGE UP (ref 0–0)
NRBC # BLD: 0 /100 WBCS — SIGNIFICANT CHANGE UP (ref 0–0)
PLATELET # BLD AUTO: 192 K/UL — SIGNIFICANT CHANGE UP (ref 150–400)
PLATELET # BLD AUTO: 243 K/UL — SIGNIFICANT CHANGE UP (ref 150–400)
POTASSIUM SERPL-MCNC: 4.1 MMOL/L — SIGNIFICANT CHANGE UP (ref 3.5–5.3)
POTASSIUM SERPL-MCNC: 5.2 MMOL/L — SIGNIFICANT CHANGE UP (ref 3.5–5.3)
POTASSIUM SERPL-SCNC: 4.1 MMOL/L — SIGNIFICANT CHANGE UP (ref 3.5–5.3)
POTASSIUM SERPL-SCNC: 5.2 MMOL/L — SIGNIFICANT CHANGE UP (ref 3.5–5.3)
RBC # BLD: 3.37 M/UL — LOW (ref 4.2–5.8)
RBC # BLD: 3.45 M/UL — LOW (ref 4.2–5.8)
RBC # FLD: 12.6 % — SIGNIFICANT CHANGE UP (ref 10.3–14.5)
RBC # FLD: 12.6 % — SIGNIFICANT CHANGE UP (ref 10.3–14.5)
SODIUM SERPL-SCNC: 144 MMOL/L — SIGNIFICANT CHANGE UP (ref 135–145)
SODIUM SERPL-SCNC: 145 MMOL/L — SIGNIFICANT CHANGE UP (ref 135–145)
SPECIMEN SOURCE: SIGNIFICANT CHANGE UP
VANCOMYCIN TROUGH SERPL-MCNC: 10.9 UG/ML — SIGNIFICANT CHANGE UP (ref 10–20)
WBC # BLD: 3.88 K/UL — SIGNIFICANT CHANGE UP (ref 3.8–10.5)
WBC # BLD: 4.76 K/UL — SIGNIFICANT CHANGE UP (ref 3.8–10.5)
WBC # FLD AUTO: 3.88 K/UL — SIGNIFICANT CHANGE UP (ref 3.8–10.5)
WBC # FLD AUTO: 4.76 K/UL — SIGNIFICANT CHANGE UP (ref 3.8–10.5)

## 2019-06-08 PROCEDURE — 99233 SBSQ HOSP IP/OBS HIGH 50: CPT

## 2019-06-08 RX ORDER — VANCOMYCIN HCL 1 G
1000 VIAL (EA) INTRAVENOUS EVERY 8 HOURS
Refills: 0 | Status: DISCONTINUED | OUTPATIENT
Start: 2019-06-08 | End: 2019-06-10

## 2019-06-08 RX ORDER — MICAFUNGIN SODIUM 100 MG/1
INJECTION, POWDER, LYOPHILIZED, FOR SOLUTION INTRAVENOUS
Refills: 0 | Status: DISCONTINUED | OUTPATIENT
Start: 2019-06-08 | End: 2019-06-08

## 2019-06-08 RX ORDER — MORPHINE SULFATE 50 MG/1
2 CAPSULE, EXTENDED RELEASE ORAL ONCE
Refills: 0 | Status: DISCONTINUED | OUTPATIENT
Start: 2019-06-08 | End: 2019-06-08

## 2019-06-08 RX ORDER — VANCOMYCIN HCL 1 G
1000 VIAL (EA) INTRAVENOUS EVERY 12 HOURS
Refills: 0 | Status: DISCONTINUED | OUTPATIENT
Start: 2019-06-08 | End: 2019-06-08

## 2019-06-08 RX ORDER — LANOLIN ALCOHOL/MO/W.PET/CERES
3 CREAM (GRAM) TOPICAL ONCE
Refills: 0 | Status: COMPLETED | OUTPATIENT
Start: 2019-06-08 | End: 2019-06-09

## 2019-06-08 RX ORDER — MICAFUNGIN SODIUM 100 MG/1
150 INJECTION, POWDER, LYOPHILIZED, FOR SOLUTION INTRAVENOUS ONCE
Refills: 0 | Status: COMPLETED | OUTPATIENT
Start: 2019-06-08 | End: 2019-06-08

## 2019-06-08 RX ORDER — FLUCONAZOLE 150 MG/1
400 TABLET ORAL EVERY 24 HOURS
Refills: 0 | Status: DISCONTINUED | OUTPATIENT
Start: 2019-06-09 | End: 2019-06-10

## 2019-06-08 RX ORDER — MICAFUNGIN SODIUM 100 MG/1
150 INJECTION, POWDER, LYOPHILIZED, FOR SOLUTION INTRAVENOUS ONCE
Refills: 0 | Status: DISCONTINUED | OUTPATIENT
Start: 2019-06-08 | End: 2019-06-08

## 2019-06-08 RX ORDER — PIPERACILLIN AND TAZOBACTAM 4; .5 G/20ML; G/20ML
3.38 INJECTION, POWDER, LYOPHILIZED, FOR SOLUTION INTRAVENOUS EVERY 8 HOURS
Refills: 0 | Status: DISCONTINUED | OUTPATIENT
Start: 2019-06-08 | End: 2019-06-10

## 2019-06-08 RX ORDER — NICOTINE POLACRILEX 2 MG
1 GUM BUCCAL DAILY
Refills: 0 | Status: DISCONTINUED | OUTPATIENT
Start: 2019-06-08 | End: 2019-07-01

## 2019-06-08 RX ADMIN — Medication 650 MILLIGRAM(S): at 08:40

## 2019-06-08 RX ADMIN — Medication 650 MILLIGRAM(S): at 22:30

## 2019-06-08 RX ADMIN — Medication 250 MILLIGRAM(S): at 22:32

## 2019-06-08 RX ADMIN — Medication 1 PATCH: at 07:56

## 2019-06-08 RX ADMIN — Medication 1 PATCH: at 12:02

## 2019-06-08 RX ADMIN — MICAFUNGIN SODIUM 100 MILLIGRAM(S): 100 INJECTION, POWDER, LYOPHILIZED, FOR SOLUTION INTRAVENOUS at 15:32

## 2019-06-08 RX ADMIN — Medication 1 PATCH: at 17:46

## 2019-06-08 RX ADMIN — Medication 650 MILLIGRAM(S): at 21:30

## 2019-06-08 RX ADMIN — PIPERACILLIN AND TAZOBACTAM 25 GRAM(S): 4; .5 INJECTION, POWDER, LYOPHILIZED, FOR SOLUTION INTRAVENOUS at 21:30

## 2019-06-08 RX ADMIN — Medication 650 MILLIGRAM(S): at 09:40

## 2019-06-08 RX ADMIN — Medication 1 PATCH: at 19:30

## 2019-06-08 RX ADMIN — PIPERACILLIN AND TAZOBACTAM 25 GRAM(S): 4; .5 INJECTION, POWDER, LYOPHILIZED, FOR SOLUTION INTRAVENOUS at 14:48

## 2019-06-08 RX ADMIN — Medication 250 MILLIGRAM(S): at 12:02

## 2019-06-08 NOTE — PROGRESS NOTE ADULT - SUBJECTIVE AND OBJECTIVE BOX
INTERVAL HPI/OVERNIGHT EVENTS:     Patient seen and examined. s/p I and D of Lt arm abscess, tolerated procedure well.    No events over night.    T(C): 36.7 (06-08-19 @ 14:08), Max: 36.7 (06-08-19 @ 14:08)  HR: 66 (06-08-19 @ 14:08) (66 - 81)  BP: 146/56 (06-08-19 @ 14:08) (106/60 - 152/101)  RR: 16 (06-08-19 @ 14:08) (16 - 16)  SpO2: 97% (06-08-19 @ 14:08) (97% - 98%)     MEDICATIONS  (STANDING):  nicotine - 21 mG/24Hr(s) Patch 1 patch Transdermal daily  piperacillin/tazobactam IVPB. 3.375 Gram(s) IV Intermittent every 8 hours  vancomycin  IVPB 1000 milliGRAM(s) IV Intermittent every 8 hours    MEDICATIONS  (PRN):  acetaminophen   Tablet .. 650 milliGRAM(s) Oral every 6 hours PRN Mild Pain (1 - 3)        PHYSICAL EXAM:    GENERAL: NAD, well-groomed, well-developed, awake, alert, oriented x 3, fluent and coherent speech  HEAD:  Atraumatic, Normocephalic  EYES: EOMI, conjunctiva and sclera clear  NECK: Supple  NERVOUS SYSTEM:  Good concentration; Moving all 4 extremities; No gross sensory deficits, No facial droop  CHEST WALL: No masses  CHEST/LUNG: Clear to auscultation bilaterally; No rales, rhonchi, wheezing, or rubs  HEART: Regular rate and rhythm; No murmurs, rubs, or gallops  ABDOMEN: Soft, Nontender, Nondistended, Bowel sounds present, No palpable masses or organomegaly, No bruits  EXTREMITIES:  left arm under dressing   SKIN: No rashes or lesions                                              8.7    3.88  )-----------( 192      ( 08 Jun 2019 08:29 )             27.8               144|118|35<96  5.2|19|1.22  8.4,--,--  06-08 @ 08:29    CAPILLARY BLOOD GLUCOSE           RADIOLOGY & ADDITIONAL TESTS:

## 2019-06-08 NOTE — CONSULT NOTE ADULT - SUBJECTIVE AND OBJECTIVE BOX
HPI:  39 year old male admitted yesterday  with a draining wound on his left forearm, pt was seen yesterday but did not want to stay, he did want to try oral antibiotics, he does admit to heroin use prior to his symptoms, he did have fever on Saturday and  then swelling which worsened, he denies tenderness +drainage of pus today - denies local pain - OF note -## he does not want anybody especially his fiance to know about his drug use## (2019 12:25)      PAST MEDICAL & SURGICAL HISTORY:  Substance abuse  No significant past surgical history      SOCHX:   tobacco,  -  alcohol    FMHX: FA/MO  - contributory       Recent Travel:    Immunizations:    Allergies    No Known Allergies    Intolerances        MEDICATIONS  (STANDING):  micafungin IVPB      micafungin IVPB 150 milliGRAM(s) IV Intermittent once  piperacillin/tazobactam IVPB. 3.375 Gram(s) IV Intermittent every 8 hours  vancomycin  IVPB 1000 milliGRAM(s) IV Intermittent every 8 hours    MEDICATIONS  (PRN):  acetaminophen   Tablet .. 650 milliGRAM(s) Oral every 6 hours PRN Mild Pain (1 - 3)      REVIEW OF SYSTEMS:  CONSTITUTIONAL: No fever, weight loss, or fatigue  EYES: No eye pain, visual disturbances, or discharge  ENMT:  No difficulty hearing, tinnitus, vertigo; No sinus or throat pain  NECK: No pain or stiffness  BREASTS: No pain, masses, or nipple discharge  RESPIRATORY: No cough, wheezing, chills or hemoptysis; No shortness of breath  CARDIOVASCULAR: No chest pain, palpitations, dizziness, or leg swelling  GASTROINTESTINAL: No abdominal or epigastric pain. No nausea, vomiting, or hematemesis; No diarrhea or constipation. No melena or hematochezia.  GENITOURINARY: No dysuria, frequency, hematuria, or incontinence  NEUROLOGICAL: No headaches, memory loss, loss of strength, numbness, or tremors  SKIN: No itching, burning, rashes, or lesions   LYMPH NODES: No enlarged glands  ENDOCRINE: No heat or cold intolerance; No hair loss  MUSCULOSKELETAL: No joint pain or swelling; No muscle, back, or extremity pain  PSYCHIATRIC: No depression, anxiety, mood swings, or difficulty sleeping  HEME/LYMPH: No easy bruising, or bleeding gums  ALLERGY AND IMMUNOLOGIC: No hives or eczema    VITAL SIGNS:    T(C): 36.7 (19 @ 14:08), Max: 37.1 (19 @ 20:08)  T(F): 98 (19 @ 14:08), Max: 98.7 (19 @ 20:08)  HR: 66 (19 @ 14:08) (56 - 81)  BP: 146/56 (19 @ 14:08) (106/60 - 169/87)  RR: 16 (19 @ 14:08) (16 - 20)  SpO2: 97% (19 @ 14:08) (96% - 100%)    PHYSICAL EXAM:    GENERAL: NAD, well-groomed, well-developed  HEAD:  Atraumatic, Normocephalic  EYES: EOMI, PERRLA, conjunctiva and sclera clear  ENMT: No tonsillar erythema, exudates, or enlargement; Moist mucous membranes, Good dentition, No lesions  NECK: Supple, No JVD, Normal thyroid  NERVOUS SYSTEM:  Alert & Oriented X3, Good concentration; Motor Strength 5/5 B/L upper and lower extremities; DTRs 2+ intact and symmetric  CHEST/LUNG: Clear to auscultation bilaterally; No rales, rhonchi, wheezing bilaterally  HEART: Regular rate and rhythm; No murmurs, rubs, or gallops  ABDOMEN: Soft, Nontender, Nondistended; Bowel sounds present  EXTREMITIES:  2+ Peripheral Pulses, No clubbing, cyanosis, or edema  LYMPH: No lymphadenopathy noted  SKIN: No rashes or lesions  BACK: no pressor sore     LABS:                         8.7    3.88  )-----------( 192      ( 2019 08:29 )             27.8         144  |  118<H>  |  35<H>  ----------------------------<  96  5.2   |  19<L>  |  1.22    Ca    8.4<L>      2019 08:29          Urinalysis Basic - ( 2019 18:37 )    Color: Yellow / Appearance: Slightly Turbid / S.015 / pH: x  Gluc: x / Ketone: Trace  / Bili: Small / Urobili: 8 mg/dL   Blood: x / Protein: 100 mg/dL / Nitrite: Negative   Leuk Esterase: Small / RBC: 25-50 /HPF / WBC 26-50   Sq Epi: x / Non Sq Epi: x / Bacteria: Many                Rapid HIV-1/2 Antibody: Nonreact ( @ 14:55)  Sedimentation Rate, Erythrocyte: 51 mm/hr ( @ 10:13)            Culture Results:   No growth ( @ 01:21)  Culture Results:   No growth ( @ 01:18)  Culture Results:   No growth to date. ( @ 13:03)  Culture Results:   Growth in aerobic bottle: Yeast like cells ( @ 13:03)  Culture Results:   Growth in aerobic bottle: Yeast like cells  "Due to technical problems, Proteus sp. will Not be reported as part of  the BCID panel until further notice"  ***Blood Panel PCR results on this specimen are available  approximately 3 hours after the Gram stain result.***  Gram stain, PCR, and/or culture results may not always  correspond due to difference in methodologies.  ************************************************************  This PCR assay was performed using [employer].  The following targets are tested for: Enterococcus,  vancomycin resistant enterococci, Listeria monocytogenes,  coagulase negative staphylococci, S. aureus,  methicillin resistant S. aureus, Streptococcus agalactiae  (Group B), S. pneumoniae, S. pyogenes (Group A),  Acinetobacter baumannii, Enterobacter cloacae, E. coli,  Klebsiella oxytoca, K. pneumoniae, Proteus sp.,  Serratia marcescens, Haemophilus influenzae,  Neisseria meningitidis, Pseudomonas aeruginosa, Candida  albicans, C. glabrata, C krusei, C parapsilosis,  C. tropicalis and the KPC resistance gene. ( @ 13:19)  Culture Results:   Growth in aerobic bottle: Coag Negative Staphylococcus  Single set isolate, possible contaminant. Contact  Microbiology if susceptibility testing clinically  indicated.  "Due to technical problems, Proteus sp. will Not be reported as part of  the BCID panel until further notice"  ***Blood Panel PCR results on this specimen are available  approximately 3 hours after the Gram stain result.***  Gram stain, PCR, and/or culture results may not always  correspond due to difference in methodologies.  ************************************************************  This PCR assay was performed using [employer].  The following targets are tested for: Enterococcus,  vancomycin resistant enterococci, Listeria monocytogenes,  coagulase negative staphylococci, S. aureus,  methicillin resistant S. aureus, Streptococcus agalactiae  (Group B), S. pneumoniae, S. pyogenes (Group A),  Acinetobacter baumannii, Enterobacter cloacae, E. coli,  Klebsiella oxytoca, K. pneumoniae, Proteus sp.,  Serratia marcescens, Haemophilus influenzae,  Neisseria meningitidis, Pseudomonas aeruginosa, Candida  albicans, C. glabrata, C krusei, C parapsilosis,  C. tropicalis and the KPC resistance gene. ( @ 13:19)                Radiology:    < from: CT Elbow w/ IV Cont, Left (19 @ 12:38) >    IMPRESSION:    Extensive circumferential subcutaneous soft tissue edema with a   peripherally enhancing and septated fluid collection within the   subcutaneous soft tissues at the volar aspect of the proximal forearm   interposed between the brachioradialis muscle and the pronator teres   consistent with a soft tissue abscess.                MOO WHITEHEAD M.D., ATTENDING RADIOLOGIST  This document has been electronically signed. 2019 12:55PM                < end of copied text > HPI:  39 year old male admitted yesterday  with a draining wound on his left forearm, pt was seen yesterday but did not want to stay, he did want to try oral antibiotics, he does admit to heroin use prior to his symptoms, he did have fever on Saturday and  then swelling which worsened, he denies tenderness +drainage of pus today - denies local pain - OF note -## he does not want anybody especially his fiance to know about his drug use## (2019 12:25)  has been off iv drugs x 5 years   restarted 2-3 months ago   daily 2-3 times a day   stopped suboxone 2 weeks or more ago     [phone]; dr reeves  [phone]    PAST MEDICAL & SURGICAL HISTORY:  Substance abuse  No significant past surgical history      SOCHX:  plus tobacco,  yes-  alcohol    FMHX: FA/MO  not - contributory       Recent Travel:    Immunizations:    Allergies    No Known Allergies    Intolerances        MEDICATIONS  (STANDING):  micafungin IVPB      micafungin IVPB 150 milliGRAM(s) IV Intermittent once  piperacillin/tazobactam IVPB. 3.375 Gram(s) IV Intermittent every 8 hours  vancomycin  IVPB 1000 milliGRAM(s) IV Intermittent every 8 hours    MEDICATIONS  (PRN):  acetaminophen   Tablet .. 650 milliGRAM(s) Oral every 6 hours PRN Mild Pain (1 - 3)      REVIEW OF SYSTEMS:  CONSTITUTIONAL: No fever, weight loss, or fatigue  EYES: No eye pain, visual disturbances, or discharge  ENMT:  No difficulty hearing, tinnitus, vertigo; No sinus or throat pain  NECK: No pain or stiffness  RESPIRATORY: No cough, wheezing, chills or hemoptysis; No shortness of breath  CARDIOVASCULAR: No chest pain, palpitations, dizziness, or leg swelling  GASTROINTESTINAL: No abdominal or epigastric pain. No nausea, vomiting, or hematemesis; No diarrhea or constipation. No melena or hematochezia.  GENITOURINARY: No dysuria, frequency, hematuria, or incontinence  NEUROLOGICAL: No headaches, memory loss, loss of strength, numbness, or tremors  SKIN: arm wound   LYMPH NODES: No enlarged glands  ENDOCRINE: No heat or cold intolerance; No hair loss  MUSCULOSKELETAL: No joint pain or swelling; No muscle, back, pain   has left arm pain   PSYCHIATRIC: No depression, anxiety, mood swings, or difficulty sleeping  HEME/LYMPH: No easy bruising, or bleeding gums  ALLERGY AND IMMUNOLOGIC: No hives or eczema    VITAL SIGNS:    T(C): 36.7 (19 @ 14:08), Max: 37.1 (19 @ 20:08)  T(F): 98 (19 @ 14:08), Max: 98.7 (19 @ 20:08)  HR: 66 (19 @ 14:08) (56 - 81)  BP: 146/56 (19 @ 14:08) (106/60 - 169/87)  RR: 16 (19 @ 14:08) (16 - 20)  SpO2: 97% (19 @ 14:08) (96% - 100%)    PHYSICAL EXAM:    GENERAL: NAD, well-groomed, well-developed  HEAD:  Atraumatic, Normocephalic  EYES: EOMI, PERRLA, conjunctiva and sclera clear  ENMT: No tonsillar erythema, exudates, or enlargement; Moist mucous membranes, Good dentition, No lesions  NECK: Supple, No JVD, Normal thyroid  NERVOUS SYSTEM:  Alert & Oriented X3, Good concentration; Motor Strength 5/5 B/L upper and lower extremities; DTRs 2+ intact and symmetric  CHEST/LUNG: Clear to auscultation bilaterally; No rales, rhonchi, wheezing bilaterally  HEART: Regular rate and rhythm; No murmurs, rubs, or gallops  ABDOMEN: Soft, Nontender, Nondistended; Bowel sounds present  EXTREMITIES:  2+ Peripheral Pulses, No clubbing, cyanosis, or edema  LYMPH: No lymphadenopathy noted  SKIN: huge open wound on left arm ; cellulitis ;   BACK: no pressor sore     LABS:                         8.7    3.88  )-----------( 192      ( 2019 08:29 )             27.8     -    144  |  118<H>  |  35<H>  ----------------------------<  96  5.2   |  19<L>  |  1.22    Ca    8.4<L>      2019 08:29          Urinalysis Basic - ( 2019 18:37 )    Color: Yellow / Appearance: Slightly Turbid / S.015 / pH: x  Gluc: x / Ketone: Trace  / Bili: Small / Urobili: 8 mg/dL   Blood: x / Protein: 100 mg/dL / Nitrite: Negative   Leuk Esterase: Small / RBC: 25-50 /HPF / WBC 26-50   Sq Epi: x / Non Sq Epi: x / Bacteria: Many                Rapid HIV-1/2 Antibody: Nonreact ( @ 14:55)  Sedimentation Rate, Erythrocyte: 51 mm/hr ( @ 10:13)            Culture Results:   No growth ( @ 01:21)  Culture Results:   No growth ( @ 01:18)  Culture Results:   No growth to date. ( @ 13:03)  Culture Results:   Growth in aerobic bottle: Yeast like cells ( @ 13:03)  Culture Results:   Growth in aerobic bottle: Yeast like cells  "Due to technical problems, Proteus sp. will Not be reported as part of  the BCID panel until further notice"  ***Blood Panel PCR results on this specimen are available  approximately 3 hours after the Gram stain result.***  Gram stain, PCR, and/or culture results may not always  correspond due to difference in methodologies.  ************************************************************  This PCR assay was performed using [employer].  The following targets are tested for: Enterococcus,  vancomycin resistant enterococci, Listeria monocytogenes,  coagulase negative staphylococci, S. aureus,  methicillin resistant S. aureus, Streptococcus agalactiae  (Group B), S. pneumoniae, S. pyogenes (Group A),  Acinetobacter baumannii, Enterobacter cloacae, E. coli,  Klebsiella oxytoca, K. pneumoniae, Proteus sp.,  Serratia marcescens, Haemophilus influenzae,  Neisseria meningitidis, Pseudomonas aeruginosa, Candida  albicans, C. glabrata, C krusei, C parapsilosis,  C. tropicalis and the KPC resistance gene. ( @ 13:19)  Culture Results:   Growth in aerobic bottle: Coag Negative Staphylococcus  Single set isolate, possible contaminant. Contact  Microbiology if susceptibility testing clinically  indicated.  "Due to technical problems, Proteus sp. will Not be reported as part of  the BCID panel until further notice"  ***Blood Panel PCR results on this specimen are available  approximately 3 hours after the Gram stain result.***  Gram stain, PCR, and/or culture results may not always  correspond due to difference in methodologies.  ************************************************************  This PCR assay was performed using [employer].  The following targets are tested for: Enterococcus,  vancomycin resistant enterococci, Listeria monocytogenes,  coagulase negative staphylococci, S. aureus,  methicillin resistant S. aureus, Streptococcus agalactiae  (Group B), S. pneumoniae, S. pyogenes (Group A),  Acinetobacter baumannii, Enterobacter cloacae, E. coli,  Klebsiella oxytoca, K. pneumoniae, Proteus sp.,  Serratia marcescens, Haemophilus influenzae,  Neisseria meningitidis, Pseudomonas aeruginosa, Candida  albicans, C. glabrata, C krusei, C parapsilosis,  C. tropicalis and the KPC resistance gene. ( @ 13:19)                Radiology:    < from: CT Elbow w/ IV Cont, Left (19 @ 12:38) >    IMPRESSION:    Extensive circumferential subcutaneous soft tissue edema with a   peripherally enhancing and septated fluid collection within the   subcutaneous soft tissues at the volar aspect of the proximal forearm   interposed between the brachioradialis muscle and the pronator teres   consistent with a soft tissue abscess.                MOO WHITEHEAD M.D., ATTENDING RADIOLOGIST  This document has been electronically signed. 2019 12:55PM                < end of copied text > HPI:  39 year old male admitted yesterday  with a draining wound on his left forearm, pt was seen yesterday but did not want to stay, he did want to try oral antibiotics, he does admit to heroin use prior to his symptoms, he did have fever on Saturday and  then swelling which worsened, he denies tenderness +drainage of pus today - denies local pain - OF note -## he does not want anybody especially his fiance to know about his drug use## (2019 12:25)  has been off iv drugs x 5 years   restarted 2-3 months ago   daily 2-3 times a day   stopped suboxone 2 weeks or more ago   i spoke to his ex detox physicians   [phone]; dr reeves  [phone]  last seen by them 3 years or more ago   recommend psych to see if suboxone can be used here       PAST MEDICAL & SURGICAL HISTORY:  Substance abuse  No significant past surgical history      SOCHX:  plus tobacco,  yes-  alcohol    FMHX: FA/MO  not - contributory       Recent Travel:    Immunizations:    Allergies    No Known Allergies    Intolerances        MEDICATIONS  (STANDING):  micafungin IVPB      micafungin IVPB 150 milliGRAM(s) IV Intermittent once  piperacillin/tazobactam IVPB. 3.375 Gram(s) IV Intermittent every 8 hours  vancomycin  IVPB 1000 milliGRAM(s) IV Intermittent every 8 hours    MEDICATIONS  (PRN):  acetaminophen   Tablet .. 650 milliGRAM(s) Oral every 6 hours PRN Mild Pain (1 - 3)      REVIEW OF SYSTEMS:  CONSTITUTIONAL: No fever, weight loss, or fatigue  EYES: No eye pain, visual disturbances, or discharge  ENMT:  No difficulty hearing, tinnitus, vertigo; No sinus or throat pain  NECK: No pain or stiffness  RESPIRATORY: No cough, wheezing, chills or hemoptysis; No shortness of breath  CARDIOVASCULAR: No chest pain, palpitations, dizziness, or leg swelling  GASTROINTESTINAL: No abdominal or epigastric pain. No nausea, vomiting, or hematemesis; No diarrhea or constipation. No melena or hematochezia.  GENITOURINARY: No dysuria, frequency, hematuria, or incontinence  NEUROLOGICAL: No headaches, memory loss, loss of strength, numbness, or tremors  SKIN: arm wound   LYMPH NODES: No enlarged glands  ENDOCRINE: No heat or cold intolerance; No hair loss  MUSCULOSKELETAL: No joint pain or swelling; No muscle, back, pain   has left arm pain   PSYCHIATRIC: No depression, anxiety, mood swings, or difficulty sleeping  HEME/LYMPH: No easy bruising, or bleeding gums  ALLERGY AND IMMUNOLOGIC: No hives or eczema    VITAL SIGNS:    T(C): 36.7 (19 @ 14:08), Max: 37.1 (19 @ 20:08)  T(F): 98 (19 @ 14:08), Max: 98.7 (19 @ 20:08)  HR: 66 (19 @ 14:08) (56 - 81)  BP: 146/56 (19 @ 14:08) (106/60 - 169/87)  RR: 16 (19 @ 14:08) (16 - 20)  SpO2: 97% (19 @ 14:08) (96% - 100%)    PHYSICAL EXAM:    GENERAL: NAD, well-groomed, well-developed  HEAD:  Atraumatic, Normocephalic  EYES: EOMI, PERRLA, conjunctiva and sclera clear  ENMT: No tonsillar erythema, exudates, or enlargement; Moist mucous membranes, Good dentition, No lesions  NECK: Supple, No JVD, Normal thyroid  NERVOUS SYSTEM:  Alert & Oriented X3, Good concentration; Motor Strength 5/5 B/L upper and lower extremities; DTRs 2+ intact and symmetric  CHEST/LUNG: Clear to auscultation bilaterally; No rales, rhonchi, wheezing bilaterally  HEART: Regular rate and rhythm; No murmurs, rubs, or gallops  ABDOMEN: Soft, Nontender, Nondistended; Bowel sounds present  EXTREMITIES:  2+ Peripheral Pulses, No clubbing, cyanosis, or edema  LYMPH: No lymphadenopathy noted  SKIN: huge open wound on left arm ; cellulitis ;   BACK: no pressor sore     LABS:                         8.7    3.88  )-----------( 192      ( 2019 08:29 )             27.8         144  |  118<H>  |  35<H>  ----------------------------<  96  5.2   |  19<L>  |  1.22    Ca    8.4<L>      2019 08:29          Urinalysis Basic - ( 2019 18:37 )    Color: Yellow / Appearance: Slightly Turbid / S.015 / pH: x  Gluc: x / Ketone: Trace  / Bili: Small / Urobili: 8 mg/dL   Blood: x / Protein: 100 mg/dL / Nitrite: Negative   Leuk Esterase: Small / RBC: 25-50 /HPF / WBC 26-50   Sq Epi: x / Non Sq Epi: x / Bacteria: Many                Rapid HIV-1/2 Antibody: Nonreact ( @ 14:55)  Sedimentation Rate, Erythrocyte: 51 mm/hr ( @ 10:13)            Culture Results:   No growth ( @ 01:21)  Culture Results:   No growth ( @ 01:18)  Culture Results:   No growth to date. ( @ 13:03)  Culture Results:   Growth in aerobic bottle: Yeast like cells ( @ 13:03)  Culture Results:   Growth in aerobic bottle: Yeast like cells  "Due to technical problems, Proteus sp. will Not be reported as part of  the BCID panel until further notice"  ***Blood Panel PCR results on this specimen are available  approximately 3 hours after the Gram stain result.***  Gram stain, PCR, and/or culture results may not always  correspond due to difference in methodologies.  ************************************************************  This PCR assay was performed using [employer].  The following targets are tested for: Enterococcus,  vancomycin resistant enterococci, Listeria monocytogenes,  coagulase negative staphylococci, S. aureus,  methicillin resistant S. aureus, Streptococcus agalactiae  (Group B), S. pneumoniae, S. pyogenes (Group A),  Acinetobacter baumannii, Enterobacter cloacae, E. coli,  Klebsiella oxytoca, K. pneumoniae, Proteus sp.,  Serratia marcescens, Haemophilus influenzae,  Neisseria meningitidis, Pseudomonas aeruginosa, Candida  albicans, C. glabrata, C krusei, C parapsilosis,  C. tropicalis and the KPC resistance gene. ( @ 13:19)  Culture Results:   Growth in aerobic bottle: Coag Negative Staphylococcus  Single set isolate, possible contaminant. Contact  Microbiology if susceptibility testing clinically  indicated.  "Due to technical problems, Proteus sp. will Not be reported as part of  the BCID panel until further notice"  ***Blood Panel PCR results on this specimen are available  approximately 3 hours after the Gram stain result.***  Gram stain, PCR, and/or culture results may not always  correspond due to difference in methodologies.  ************************************************************  This PCR assay was performed using [employer].  The following targets are tested for: Enterococcus,  vancomycin resistant enterococci, Listeria monocytogenes,  coagulase negative staphylococci, S. aureus,  methicillin resistant S. aureus, Streptococcus agalactiae  (Group B), S. pneumoniae, S. pyogenes (Group A),  Acinetobacter baumannii, Enterobacter cloacae, E. coli,  Klebsiella oxytoca, K. pneumoniae, Proteus sp.,  Serratia marcescens, Haemophilus influenzae,  Neisseria meningitidis, Pseudomonas aeruginosa, Candida  albicans, C. glabrata, C krusei, C parapsilosis,  C. tropicalis and the KPC resistance gene. ( @ 13:19)                Radiology:    < from: CT Elbow w/ IV Cont, Left (19 @ 12:38) >    IMPRESSION:    Extensive circumferential subcutaneous soft tissue edema with a   peripherally enhancing and septated fluid collection within the   subcutaneous soft tissues at the volar aspect of the proximal forearm   interposed between the brachioradialis muscle and the pronator teres   consistent with a soft tissue abscess.                MOO WHITEHEAD M.D., ATTENDING RADIOLOGIST  This document has been electronically signed. 2019 12:55PM                < end of copied text >

## 2019-06-08 NOTE — CONSULT NOTE ADULT - ASSESSMENT
intravenous drug addict   multiple org in blood culture   candida parapsilosis ;; drug of choice is diflucan   cns intravenous drug addict   multiple org in blood culture   candida parapsilosis ;; drug of choice is diflucan   cns   perhaps MRSA but micro believes there is no MRSA in the blood culture   final clarification in 48 hours   consider psych to start suboxone administration   echo   more blood culture   will follow with you thanks

## 2019-06-08 NOTE — PROGRESS NOTE ADULT - ASSESSMENT
POD1 I and D left forearm  -wound care:  irrigate with normal saline, wet to dry normal saline packing using 4 inch gauze, twice daily in hospital then daily if discharged home  -OR cultures pending  -antimicrobial coverage as per ID Anemia  -consider hematology consult    POD1 I and D left forearm  -wound care:  irrigate with normal saline, wet to dry normal saline packing using 4 inch gauze, twice daily in hospital then daily if discharged home  -OR cultures pending  -antimicrobial coverage as per ID

## 2019-06-08 NOTE — PROGRESS NOTE ADULT - SUBJECTIVE AND OBJECTIVE BOX
Post Op Day#: 1    Procedure:  incision and drainage abscess left forearm    Vital Signs Last 24 Hrs  T(C): 36.7 (08 Jun 2019 14:08), Max: 36.7 (08 Jun 2019 14:08)  T(F): 98 (08 Jun 2019 14:08), Max: 98 (08 Jun 2019 14:08)  HR: 66 (08 Jun 2019 14:08) (56 - 81)  BP: 146/56 (08 Jun 2019 14:08) (106/60 - 167/92)  BP(mean): --  RR: 16 (08 Jun 2019 14:08) (16 - 19)  SpO2: 97% (08 Jun 2019 14:08) (96% - 99%)    I&O's Detail    07 Jun 2019 07:01  -  08 Jun 2019 07:00  --------------------------------------------------------  IN:    Solution: 100 mL  Total IN: 100 mL    OUT:  Total OUT: 0 mL    Total NET: 100 mL      08 Jun 2019 07:01  -  08 Jun 2019 20:27  --------------------------------------------------------  IN:    Solution: 100 mL    Solution: 150 mL    Solution: 250 mL  Total IN: 500 mL    OUT:  Total OUT: 0 mL    Total NET: 500 mL                                8.7    3.88  )-----------( 192      ( 08 Jun 2019 08:29 )             27.8       06-08    144  |  118<H>  |  35<H>  ----------------------------<  96  5.2   |  19<L>  |  1.22    Ca    8.4<L>      08 Jun 2019 08:29        INR:     Radiology:    Physical Exam:    General:  Appears stated age, well-groomed, well-nourished, no distress     Extremities:  decrease redness and edema upper arm and foream, now mostly around surgery site.  wound shows no new pus and is clean

## 2019-06-08 NOTE — PROGRESS NOTE ADULT - PROBLEM SELECTOR PLAN 1
s/p I and D by plastics.  Blood cultures positive for Candida and MRSA, continue with Vanc and Zosyn, add Diflucan.   ID follow up appreciated.   HIV ordered  Follow up wound cultures.

## 2019-06-09 DIAGNOSIS — I10 ESSENTIAL (PRIMARY) HYPERTENSION: ICD-10-CM

## 2019-06-09 DIAGNOSIS — B49 UNSPECIFIED MYCOSIS: ICD-10-CM

## 2019-06-09 LAB
CULTURE RESULTS: SIGNIFICANT CHANGE UP
GRAM STN FLD: SIGNIFICANT CHANGE UP
ORGANISM # SPEC MICROSCOPIC CNT: SIGNIFICANT CHANGE UP
ORGANISM # SPEC MICROSCOPIC CNT: SIGNIFICANT CHANGE UP
SPECIMEN SOURCE: SIGNIFICANT CHANGE UP

## 2019-06-09 PROCEDURE — 99233 SBSQ HOSP IP/OBS HIGH 50: CPT

## 2019-06-09 RX ORDER — LISINOPRIL 2.5 MG/1
2.5 TABLET ORAL DAILY
Refills: 0 | Status: DISCONTINUED | OUTPATIENT
Start: 2019-06-09 | End: 2019-06-10

## 2019-06-09 RX ORDER — LANOLIN ALCOHOL/MO/W.PET/CERES
3 CREAM (GRAM) TOPICAL ONCE
Refills: 0 | Status: COMPLETED | OUTPATIENT
Start: 2019-06-09 | End: 2019-06-09

## 2019-06-09 RX ADMIN — FLUCONAZOLE 100 MILLIGRAM(S): 150 TABLET ORAL at 23:33

## 2019-06-09 RX ADMIN — Medication 1 PATCH: at 12:05

## 2019-06-09 RX ADMIN — Medication 3 MILLIGRAM(S): at 00:01

## 2019-06-09 RX ADMIN — Medication 250 MILLIGRAM(S): at 05:38

## 2019-06-09 RX ADMIN — Medication 250 MILLIGRAM(S): at 14:07

## 2019-06-09 RX ADMIN — PIPERACILLIN AND TAZOBACTAM 25 GRAM(S): 4; .5 INJECTION, POWDER, LYOPHILIZED, FOR SOLUTION INTRAVENOUS at 14:07

## 2019-06-09 RX ADMIN — PIPERACILLIN AND TAZOBACTAM 25 GRAM(S): 4; .5 INJECTION, POWDER, LYOPHILIZED, FOR SOLUTION INTRAVENOUS at 05:38

## 2019-06-09 RX ADMIN — FLUCONAZOLE 100 MILLIGRAM(S): 150 TABLET ORAL at 00:01

## 2019-06-09 RX ADMIN — Medication 1 PATCH: at 19:50

## 2019-06-09 RX ADMIN — Medication 1 PATCH: at 06:35

## 2019-06-09 RX ADMIN — Medication 650 MILLIGRAM(S): at 23:32

## 2019-06-09 RX ADMIN — Medication 250 MILLIGRAM(S): at 22:23

## 2019-06-09 RX ADMIN — Medication 3 MILLIGRAM(S): at 23:32

## 2019-06-09 RX ADMIN — LISINOPRIL 2.5 MILLIGRAM(S): 2.5 TABLET ORAL at 18:27

## 2019-06-09 RX ADMIN — PIPERACILLIN AND TAZOBACTAM 25 GRAM(S): 4; .5 INJECTION, POWDER, LYOPHILIZED, FOR SOLUTION INTRAVENOUS at 22:22

## 2019-06-09 NOTE — PROGRESS NOTE ADULT - PROBLEM SELECTOR PLAN 1
s/p I and D by plastics.  Blood cultures positive for Candida and MRSA, continue with Vanc and Zosyn, add Diflucan. ID follow up appreciated.   HIV non reactive.   Wound cultures negative .

## 2019-06-09 NOTE — PROGRESS NOTE ADULT - SUBJECTIVE AND OBJECTIVE BOX
INTERVAL HPI/OVERNIGHT EVENTS:     Patient seen and examined. s/p I and D of Lt arm abscess, tolerated procedure well.    No events over night.    T(C): 36.8 (06-09-19 @ 18:26), Max: 36.8 (06-09-19 @ 18:26)  HR: 71 (06-09-19 @ 18:26) (62 - 71)  BP: 161/83 (06-09-19 @ 18:26) (152/89 - 161/83)  RR: 18 (06-09-19 @ 18:26) (18 - 18)  SpO2: 100% (06-09-19 @ 18:26) (98% - 100%)      MEDICATIONS  (STANDING):  fluconAZOLE IVPB 400 milliGRAM(s) IV Intermittent every 24 hours  lisinopril 2.5 milliGRAM(s) Oral daily  nicotine - 21 mG/24Hr(s) Patch 1 patch Transdermal daily  piperacillin/tazobactam IVPB. 3.375 Gram(s) IV Intermittent every 8 hours  vancomycin  IVPB 1000 milliGRAM(s) IV Intermittent every 8 hours    MEDICATIONS  (PRN):  acetaminophen   Tablet .. 650 milliGRAM(s) Oral every 6 hours PRN Mild Pain (1 - 3)  PHYSICAL EXAM:    GENERAL: NAD, well-groomed, well-developed, awake, alert, oriented x 3, fluent and coherent speech  HEAD:  Atraumatic, Normocephalic  EYES: EOMI, conjunctiva and sclera clear  NECK: Supple  NERVOUS SYSTEM:  Good concentration; Moving all 4 extremities; No gross sensory deficits, No facial droop  CHEST WALL: No masses  CHEST/LUNG: Clear to auscultation bilaterally; No rales, rhonchi, wheezing, or rubs  HEART: Regular rate and rhythm; No murmurs, rubs, or gallops  ABDOMEN: Soft, Nontender, Nondistended, Bowel sounds present, No palpable masses or organomegaly, No bruits  EXTREMITIES:  left arm under dressing   SKIN: No rashes or lesions                                                 9.0    4.76  )-----------( 243      ( 08 Jun 2019 23:32 )             28.4               145|115|31<90  4.1|22|1.22  8.3,--,--  06-08 @ 23:32  CAPILLARY BLOOD GLUCOSE           RADIOLOGY & ADDITIONAL TESTS:

## 2019-06-10 DIAGNOSIS — F11.10 OPIOID ABUSE, UNCOMPLICATED: ICD-10-CM

## 2019-06-10 DIAGNOSIS — F14.10 COCAINE ABUSE, UNCOMPLICATED: ICD-10-CM

## 2019-06-10 DIAGNOSIS — F12.90 CANNABIS USE, UNSPECIFIED, UNCOMPLICATED: ICD-10-CM

## 2019-06-10 DIAGNOSIS — F11.20 OPIOID DEPENDENCE, UNCOMPLICATED: ICD-10-CM

## 2019-06-10 LAB
-  AMPHOTERICIN B: SIGNIFICANT CHANGE UP
-  ANIDULAFUNGIN: SIGNIFICANT CHANGE UP
-  CASPOFUNGIN: SIGNIFICANT CHANGE UP
-  FLUCONAZOLE: SIGNIFICANT CHANGE UP
-  MICAFUNGIN: SIGNIFICANT CHANGE UP
-  POSACONAZOLE: SIGNIFICANT CHANGE UP
-  VORICONAZOLE: SIGNIFICANT CHANGE UP
CULTURE RESULTS: SIGNIFICANT CHANGE UP
GRAM STN FLD: SIGNIFICANT CHANGE UP
HBA1C BLD-MCNC: 5 % — SIGNIFICANT CHANGE UP (ref 4–5.6)
METHOD TYPE: SIGNIFICANT CHANGE UP
ORGANISM # SPEC MICROSCOPIC CNT: SIGNIFICANT CHANGE UP
SPECIMEN SOURCE: SIGNIFICANT CHANGE UP
VANCOMYCIN TROUGH SERPL-MCNC: 24.8 UG/ML — HIGH (ref 10–20)

## 2019-06-10 PROCEDURE — 90792 PSYCH DIAG EVAL W/MED SRVCS: CPT

## 2019-06-10 PROCEDURE — 99233 SBSQ HOSP IP/OBS HIGH 50: CPT

## 2019-06-10 RX ORDER — MICAFUNGIN SODIUM 100 MG/1
INJECTION, POWDER, LYOPHILIZED, FOR SOLUTION INTRAVENOUS
Refills: 0 | Status: DISCONTINUED | OUTPATIENT
Start: 2019-06-10 | End: 2019-06-17

## 2019-06-10 RX ORDER — LISINOPRIL 2.5 MG/1
10 TABLET ORAL DAILY
Refills: 0 | Status: DISCONTINUED | OUTPATIENT
Start: 2019-06-11 | End: 2019-06-12

## 2019-06-10 RX ORDER — AMLODIPINE BESYLATE 2.5 MG/1
10 TABLET ORAL DAILY
Refills: 0 | Status: DISCONTINUED | OUTPATIENT
Start: 2019-06-10 | End: 2019-07-01

## 2019-06-10 RX ORDER — MICAFUNGIN SODIUM 100 MG/1
150 INJECTION, POWDER, LYOPHILIZED, FOR SOLUTION INTRAVENOUS ONCE
Refills: 0 | Status: COMPLETED | OUTPATIENT
Start: 2019-06-10 | End: 2019-06-10

## 2019-06-10 RX ORDER — CLONAZEPAM 1 MG
0.5 TABLET ORAL
Refills: 0 | Status: DISCONTINUED | OUTPATIENT
Start: 2019-06-10 | End: 2019-06-12

## 2019-06-10 RX ORDER — METHADONE HYDROCHLORIDE 40 MG/1
5 TABLET ORAL EVERY 4 HOURS
Refills: 0 | Status: DISCONTINUED | OUTPATIENT
Start: 2019-06-10 | End: 2019-06-11

## 2019-06-10 RX ORDER — ZOLPIDEM TARTRATE 10 MG/1
10 TABLET ORAL AT BEDTIME
Refills: 0 | Status: DISCONTINUED | OUTPATIENT
Start: 2019-06-10 | End: 2019-06-17

## 2019-06-10 RX ORDER — MICAFUNGIN SODIUM 100 MG/1
150 INJECTION, POWDER, LYOPHILIZED, FOR SOLUTION INTRAVENOUS EVERY 24 HOURS
Refills: 0 | Status: DISCONTINUED | OUTPATIENT
Start: 2019-06-11 | End: 2019-06-17

## 2019-06-10 RX ORDER — VANCOMYCIN HCL 1 G
750 VIAL (EA) INTRAVENOUS EVERY 8 HOURS
Refills: 0 | Status: DISCONTINUED | OUTPATIENT
Start: 2019-06-10 | End: 2019-06-21

## 2019-06-10 RX ORDER — HYDRALAZINE HCL 50 MG
25 TABLET ORAL ONCE
Refills: 0 | Status: COMPLETED | OUTPATIENT
Start: 2019-06-10 | End: 2019-06-10

## 2019-06-10 RX ADMIN — LISINOPRIL 2.5 MILLIGRAM(S): 2.5 TABLET ORAL at 05:42

## 2019-06-10 RX ADMIN — MICAFUNGIN SODIUM 100 MILLIGRAM(S): 100 INJECTION, POWDER, LYOPHILIZED, FOR SOLUTION INTRAVENOUS at 19:32

## 2019-06-10 RX ADMIN — Medication 650 MILLIGRAM(S): at 00:25

## 2019-06-10 RX ADMIN — AMLODIPINE BESYLATE 10 MILLIGRAM(S): 2.5 TABLET ORAL at 11:22

## 2019-06-10 RX ADMIN — PIPERACILLIN AND TAZOBACTAM 25 GRAM(S): 4; .5 INJECTION, POWDER, LYOPHILIZED, FOR SOLUTION INTRAVENOUS at 05:42

## 2019-06-10 RX ADMIN — Medication 1 PATCH: at 07:00

## 2019-06-10 RX ADMIN — Medication 1 PATCH: at 19:00

## 2019-06-10 RX ADMIN — Medication 25 MILLIGRAM(S): at 14:56

## 2019-06-10 RX ADMIN — Medication 650 MILLIGRAM(S): at 21:53

## 2019-06-10 RX ADMIN — Medication 250 MILLIGRAM(S): at 05:41

## 2019-06-10 RX ADMIN — Medication 250 MILLIGRAM(S): at 21:02

## 2019-06-10 RX ADMIN — PIPERACILLIN AND TAZOBACTAM 25 GRAM(S): 4; .5 INJECTION, POWDER, LYOPHILIZED, FOR SOLUTION INTRAVENOUS at 14:25

## 2019-06-10 RX ADMIN — Medication 650 MILLIGRAM(S): at 20:10

## 2019-06-10 RX ADMIN — ZOLPIDEM TARTRATE 10 MILLIGRAM(S): 10 TABLET ORAL at 22:07

## 2019-06-10 RX ADMIN — Medication 1 PATCH: at 11:22

## 2019-06-10 RX ADMIN — Medication 1 PATCH: at 11:27

## 2019-06-10 NOTE — BEHAVIORAL HEALTH ASSESSMENT NOTE - NSBHCHARTREVIEWLAB_PSY_A_CORE FT
Blood cultures positive for Candida and MRSA, continue with Vanc,  Zosyn, and  Diflucan; UTOX "+" for opiate, THC and cocaine on admission

## 2019-06-10 NOTE — BEHAVIORAL HEALTH ASSESSMENT NOTE - RISK ASSESSMENT
Chronic risk factors: male gender., ongoing substance use. Protective factors: young; healthy; usually medication and treatment compliant; no history of psych  hospitalizations, denies hx of suicide attempts; no self-injurious behavior; no hx of aggression/violence; no legal issues; motivated for help; articulate; strong family support; access to health services.

## 2019-06-10 NOTE — PROGRESS NOTE ADULT - SUBJECTIVE AND OBJECTIVE BOX
INTERVAL HPI/OVERNIGHT EVENTS:     Patient seen and examined. s/p I and D of Lt arm abscess, tolerated procedure well.    No events over night.        MEDICATIONS  (STANDING):  amLODIPine   Tablet 10 milliGRAM(s) Oral daily  fluconAZOLE IVPB 400 milliGRAM(s) IV Intermittent every 24 hours  lisinopril 2.5 milliGRAM(s) Oral daily  nicotine - 21 mG/24Hr(s) Patch 1 patch Transdermal daily  piperacillin/tazobactam IVPB. 3.375 Gram(s) IV Intermittent every 8 hours  vancomycin  IVPB 1000 milliGRAM(s) IV Intermittent every 8 hours    MEDICATIONS  (PRN):  acetaminophen   Tablet .. 650 milliGRAM(s) Oral every 6 hours PRN Mild Pain (1 - 3)      Vital Signs Last 24 Hrs  T(C): 36.4 (10 David 2019 05:26), Max: 37.2 (09 Jun 2019 23:41)  T(F): 97.6 (10 David 2019 05:26), Max: 99 (09 Jun 2019 23:41)  HR: 76 (10 David 2019 05:26) (62 - 76)  BP: 178/96 (10 David 2019 05:26) (152/89 - 178/96)  BP(mean): --  RR: 18 (10 David 2019 05:26) (17 - 18)  SpO2: 99% (10 David 2019 05:26) (98% - 100%)      PHYSICAL EXAM:    GENERAL: NAD, well-groomed, well-developed, awake, alert, oriented x 3, fluent and coherent speech  HEAD:  Atraumatic, Normocephalic  EYES: EOMI, conjunctiva and sclera clear  NECK: Supple  NERVOUS SYSTEM:  Good concentration; Moving all 4 extremities; No gross sensory deficits, No facial droop  CHEST WALL: No masses  CHEST/LUNG: Clear to auscultation bilaterally; No rales, rhonchi, wheezing, or rubs  HEART: Regular rate and rhythm; No murmurs, rubs, or gallops  ABDOMEN: Soft, Nontender, Nondistended, Bowel sounds present, No palpable masses or organomegaly, No bruits  EXTREMITIES:  left arm under dressing   SKIN: No rashes or lesions                      LABS:                        9.0    4.76  )-----------( 243      ( 08 Jun 2019 23:32 )             28.4     06-08    145  |  115<H>  |  31<H>  ----------------------------<  90  4.1   |  22  |  1.22    Ca    8.3<L>      08 Jun 2019 23:32           RADIOLOGY & ADDITIONAL TESTS:

## 2019-06-10 NOTE — BEHAVIORAL HEALTH ASSESSMENT NOTE - SUMMARY
RECOMMENDATIONS:   - PRN methadone for opiate withdrawla symptoms  - risks/benefits/alternatives discussed and given pt's history, he will not have clinical response to trazodone, remeron or benadryl etc. Ambien 10mg PO qhs with its mechanism of action most likely to help with sleep.   - Same reason PRNs for anxiety will be a benzo and not atarax etx. Goal is for Patient to be physically comfortable during this stay and tolerate the necessary medical treatment without craving/becoming agitated.  - has capacity to make his medical decisions   - as per Suboxone treatment protocol, induction needs to be at prescriber's office with monitoring while patient has moderate withdrawal symptoms for maximum clinical response. Hence Suboxone should not be started at this time during this admission  - Patient is planning on going to his Suboxone clinic after discharge

## 2019-06-10 NOTE — BEHAVIORAL HEALTH ASSESSMENT NOTE - NSBHVIOLPROTECT_PSY_A_CORE
Relationship stability/Residential stability Relationship stability/Employment stability/Engagement in treatment/Residential stability

## 2019-06-10 NOTE — CONSULT NOTE ADULT - SUBJECTIVE AND OBJECTIVE BOX
HPI:  HPI:  39 year old male presents today for follow up, pt presents with a draining wound on his left forearm, pt was seen yesterday but did not want to stay, he did want to try oral antibiotics, he does admit to heroin use prior to his symptoms, he did have fever on Saturday and  then swelling which worsened, he denies tenderness +drainage of pus today - denies local pain - OF note -## he does not want anybody especially his fiance to know about his drug use## (2019 12:25)      Chief Complaint:  Patient is a 39y old  Male who presents with a chief complaint of cellultis (10 David 2019 19:57)      Review of Systems:    General:  No wt loss, fevers, chills, night sweats  Eyes:  Good vision, no reported pain  ENT:  No sore throat, pain, runny nose, dysphagia  CV:  No pain, palpitations, hypo/hypertension  Resp:  No dyspnea, cough, tachypnea, wheezing  GI:  No pain, nausea, vomiting, diarrhea, constipation  :  No pain, bleeding, incontinence, nocturia           Social History/Family History  SOCHX:   tobacco,  -  alcohol    FMHX: FA/MO  - contributory       Discussed with:  PMD, Family    Physical Exam:    Vital Signs:  Vital Signs Last 24 Hrs  T(C): 36.2 (2019 05:44), Max: 36.5 (10 David 2019 11:24)  T(F): 97.2 (2019 05:44), Max: 97.7 (10 David 2019 11:24)  HR: 66 (2019 05:44) (66 - 100)  BP: 155/98 (2019 05:44) (152/83 - 181/97)  BP(mean): --  RR: 18 (2019 05:44) (17 - 18)  SpO2: 98% (2019 05:44) (98% - 100%)  Daily     Daily Weight in k.4 (2019 05:44)  I&O's Summary    2019 07:01  -  10 David 2019 07:00  --------------------------------------------------------  IN: 2030 mL / OUT: 0 mL / NET: 2030 mL          Chest:  Full & symmetric excursion, no increased effort, breath sounds clear  Cardiovascular:  Regular rhythm, S1, S2, no murmur/rub/S3/S4,  Abdomen:  Soft, non-tender, non-distended, normoactive bowel sounds, no HSM      Laboratory:                    Assessment:  Bacteremia  Asked to assess for ALEJANDRO  TTE noted  I will schedule a ALEJANDRO this week

## 2019-06-10 NOTE — BEHAVIORAL HEALTH ASSESSMENT NOTE - DESCRIPTION (FIRST USE, LAST USE, QUANTITY, FREQUENCY, DURATION)
cigarette smoker sporadic use, recent use during bachelor party years of IVD; longest period of sobriety 1.5 yrs with help of Suboxone

## 2019-06-10 NOTE — PROGRESS NOTE ADULT - ASSESSMENT
intravenous drug addict suppurative thrombo phlebitis   cns candida   dc zosyn   resume mycakmine as need ALEJANDRO   if more blood culture NEGATIVE and ALEJANDRO NEGATIVE discharge plan on po diflucan   await more blood culture   no murmur   no stigmata of infectious endocarditis

## 2019-06-10 NOTE — BEHAVIORAL HEALTH ASSESSMENT NOTE - NSBHCHARTREVIEWIMAGING_PSY_A_CORE FT
CT Extensive circumferential subcutaneous soft tissue edema with a peripherally enhancing and septated fluid collection within the subcutaneous soft tissues at the volar aspect of the proximal forearm

## 2019-06-10 NOTE — PROGRESS NOTE ADULT - PROBLEM SELECTOR PLAN 1
s/p I and D by plastics.  Blood cultures positive for Candida and MRSA, continue with Vanc,  Zosyn, and  Diflucan. ID follow up appreciated.   HIV non reactive.   Wound cultures negative .

## 2019-06-10 NOTE — PROGRESS NOTE ADULT - SUBJECTIVE AND OBJECTIVE BOX
HPI:  39 year old male presents today for follow up, pt presents with a draining wound on his left forearm, pt was seen yesterday but did not want to stay, he did want to try oral antibiotics, he does admit to heroin use prior to his symptoms, he did have fever on Saturday and Sunday then swelling which worsened, he denies tenderness +drainage of pus today - denies local pain - OF note -## he does not want anybody especially his fiance to know about his drug use## (06 Jun 2019 12:25)      Allergies    No Known Allergies    Intolerances        MEDICATIONS  (STANDING):  amLODIPine   Tablet 10 milliGRAM(s) Oral daily  micafungin IVPB      nicotine - 21 mG/24Hr(s) Patch 1 patch Transdermal daily  vancomycin  IVPB 750 milliGRAM(s) IV Intermittent every 8 hours  zolpidem 10 milliGRAM(s) Oral at bedtime    MEDICATIONS  (PRN):  acetaminophen   Tablet .. 650 milliGRAM(s) Oral every 6 hours PRN Mild Pain (1 - 3)  clonazePAM  Tablet 0.5 milliGRAM(s) Oral two times a day PRN anxiety  methadone    Tablet 5 milliGRAM(s) Oral every 4 hours PRN Opiate Withdrawal w/ CINA>9      REVIEW OF SYSTEMS:    CONSTITUTIONAL: No fever, chills, weight loss, or fatigue  HEENT: No sore throat, runny nose, ear ache  RESPIRATORY: No cough, wheezing, No shortness of breath  CARDIOVASCULAR: No chest pain, palpitations, dizziness  GASTROINTESTINAL: No abdominal pain. No nausea, vomiting, diarrhea  GENITOURINARY: No dysuria, increase frequency, hematuria, or incontinence  NEUROLOGICAL: No headaches, memory loss, loss of strength, numbness, or tremors, no weakness  EXTREMITY: No pedal edema BLE  SKIN: No itching, burning, rashes, or lesions     VITAL SIGNS:  T(C): 36.4 (06-10-19 @ 17:00), Max: 37.2 (06-09-19 @ 23:41)  T(F): 97.5 (06-10-19 @ 17:00), Max: 99 (06-09-19 @ 23:41)  HR: 72 (06-10-19 @ 17:00) (65 - 100)  BP: 152/83 (06-10-19 @ 17:00) (152/83 - 181/97)  RR: 18 (06-10-19 @ 17:00) (17 - 18)  SpO2: 100% (06-10-19 @ 17:00) (99% - 100%)  Wt(kg): --    PHYSICAL EXAM:    GENERAL: not in any distress  HEENT: Neck is supple, normocephalic, atraumatic   CHEST/LUNG: Clear to auscultation bilaterally; No rales, rhonchi, wheezing  HEART: Regular rate and rhythm; No murmurs, rubs, or gallops  ABDOMEN: Soft, Nontender, Nondistended; Bowel sounds present, no rebound   EXTREMITIES:  2+ Peripheral Pulses, No clubbing, cyanosis, or edema  GENITOURINARY:   SKIN: No rashes or lesions  BACK: no pressor sore   NERVOUS SYSTEM:  Alert & Oriented X3, Good concentration  PSYCH: normal affect     LABS:                         9.0    4.76  )-----------( 243      ( 08 Jun 2019 23:32 )             28.4     06-08    145  |  115<H>  |  31<H>  ----------------------------<  90  4.1   |  22  |  1.22    Ca    8.3<L>      08 Jun 2019 23:32                      Rapid HIV-1/2 Antibody: Nonreact (06-07 @ 14:55)      Vancomycin Level, Trough: 24.8 ug/mL (06-10 @ 11:51)        Culture Results:   No growth to date. (06-09 @ 12:21)  Culture Results:   No growth to date. (06-09 @ 01:45)  Culture Results:   No growth to date. (06-07 @ 23:09)  Culture Results:   No growth (06-07 @ 23:02)  Culture Results:   No growth at 48 hours (06-07 @ 01:21)  Culture Results:   No growth (06-07 @ 01:18)  Culture Results:   Growth in aerobic bottle: Yeast like cells (06-06 @ 13:03)  Culture Results:   Growth in aerobic bottle: Candida orthopsilosis  Fungal ELIO Interpretive Guidelines:  SDD*= Sensitive dose dependent  AMPHOTERICIN B= N/A; No interpretive guidelines available  FLUCONAZOLE= Data established for mucosal disease, and  is generally applicable for invasive disease.  Susceptibility is dependent on achieving  the maximal possible blood level.  Doses of 400 MG/day or more may be required in adults with  normal renal function and body habitus.  ITRACONAZOLE= Data apply only to mucosal disease.  Susceptibility is dependent on achieving  the maximal possible blood level.  Measure to assure drug absorption and  Itraconazole plasma concentrations of > 0.5 MCG/ML may  be required for optimal response.  FLUCYTOSINE= Breakpoints are basedon available clinical and  pharmacokinetic data.  VORICONAZOLE: The ELIO has been determined by the  colormetric microdilution method.  This test is not approved  by the FDA and is for research use only.  The performance  characteristics of this assayhave been established by [employer] and [place], [place].  Voriconazole has been approved by the FDA for primary  therapy for invasive Aspergillosis and as salvage  therapy for treatment of Scedosporium apiospermum and  Fusariosis.  CASPOFUNGIN= Is indicated in the treatment of candidemia,  intra-abdominal abscess, peritonitis, pleural space  infections and esophageal candidiasis.  Standardized  susceptability testing methods for Echinocandins have not  been establishedfor yeast and filamentous fungi, and  results of susceptibility studies do not necessarily  correlate with clinical outcomes. (06-06 @ 13:03)  Culture Results:   Growth in aerobic bottle: Candida orthopsilosis  See previous culture [id number]  "Due to technical problems, Proteus sp. will Not be reported as part of  the BCID panel until further notice"  ***Blood Panel PCR results on this specimen are available  approximately 3 hours after the Gram stain result.***  Gram stain, PCR, and/or culture results may not always  correspond due to difference in methodologies.  ************************************************************  This PCR assay was performed using [employer].  The following targets are tested for: Enterococcus,  vancomycin resistant enterococci, Listeria monocytogenes,  coagulase negative staphylococci, S. aureus,  methicillin resistant S. aureus, Streptococcus agalactiae  (Group B), S. pneumoniae, S. pyogenes (Group A),  Acinetobacter baumannii, Enterobacter cloacae, E. coli,  Klebsiella oxytoca, K. pneumoniae, Proteus sp.,  Serratia marcescens, Haemophilus influenzae,  Neisseria meningitidis, Pseudomonas aeruginosa, Candida  albicans, C. glabrata, C krusei, C parapsilosis,  C. tropicalis and the KPC resistance gene. (06-05 @ 13:19)  Culture Results:   Growth in aerobic bottle: Coag Negative Staphylococcus  Single set isolate, possible contaminant. Contact  Microbiology if susceptibility testing clinically  indicated.  "Due to technical problems, Proteus sp. will Not be reported as part of  the BCID panel until further notice"  ***Blood Panel PCR results on this specimen are available  approximately 3 hours after the Gram stain result.***  Gram stain, PCR, and/or culture results may not always  correspond due to difference in methodologies.  ************************************************************  This PCR assay was performed using [employer].  The following targets are tested for: Enterococcus,  vancomycin resistant enterococci, Listeria monocytogenes,  coagulase negative staphylococci, S. aureus,  methicillin resistant S. aureus, Streptococcus agalactiae  (Group B), S. pneumoniae, S. pyogenes (Group A),  Acinetobacter baumannii, Enterobacter cloacae, E. coli,  Klebsiella oxytoca, K. pneumoniae, Proteus sp.,  Serratia marcescens, Haemophilus influenzae,  Neisseria meningitidis, Pseudomonas aeruginosa, Candida  albicans, C. glabrata, C krusei, C parapsilosis,  C. tropicalis and the KPC resistance gene. (06-05 @ 13:19)                Radiology: HPI:  39 year old male presents today for follow up, pt presents with a draining wound on his left forearm, pt was seen yesterday but did not want to stay, he did want to try oral antibiotics, he does admit to heroin use prior to his symptoms, he did have fever on Saturday and Sunday then swelling which worsened, he denies tenderness +drainage of pus today - denies local pain - OF note -## he does not want anybody especially his fiance to know about his drug use## (06 Jun 2019 12:25)  here polymicrobial bacteremia and fungemia  another pos blood culture for yeast today   discussed with dr mendiola    Allergies    No Known Allergies    Intolerances        MEDICATIONS  (STANDING):  amLODIPine   Tablet 10 milliGRAM(s) Oral daily  micafungin IVPB      nicotine - 21 mG/24Hr(s) Patch 1 patch Transdermal daily  vancomycin  IVPB 750 milliGRAM(s) IV Intermittent every 8 hours  zolpidem 10 milliGRAM(s) Oral at bedtime    MEDICATIONS  (PRN):  acetaminophen   Tablet .. 650 milliGRAM(s) Oral every 6 hours PRN Mild Pain (1 - 3)  clonazePAM  Tablet 0.5 milliGRAM(s) Oral two times a day PRN anxiety  methadone    Tablet 5 milliGRAM(s) Oral every 4 hours PRN Opiate Withdrawal w/ CINA>9      REVIEW OF SYSTEMS:    CONSTITUTIONAL: No fever, chills, weight loss, or fatigue  HEENT: No sore throat, runny nose, ear ache  RESPIRATORY: No cough, wheezing, No shortness of breath  CARDIOVASCULAR: No chest pain, palpitations, dizziness  GASTROINTESTINAL: No abdominal pain. No nausea, vomiting, diarrhea  GENITOURINARY: No dysuria, increase frequency, hematuria, or incontinence  NEUROLOGICAL: No headaches, memory loss, loss of strength, numbness, or tremors, no weakness  EXTREMITY: No pedal edema BLE  pain is better  SKIN: cellulitis    VITAL SIGNS:  T(C): 36.4 (06-10-19 @ 17:00), Max: 37.2 (06-09-19 @ 23:41)  T(F): 97.5 (06-10-19 @ 17:00), Max: 99 (06-09-19 @ 23:41)  HR: 72 (06-10-19 @ 17:00) (65 - 100)  BP: 152/83 (06-10-19 @ 17:00) (152/83 - 181/97)  RR: 18 (06-10-19 @ 17:00) (17 - 18)  SpO2: 100% (06-10-19 @ 17:00) (99% - 100%)  Wt(kg): --    PHYSICAL EXAM:    GENERAL: not in any distress  HEENT: Neck is supple, normocephalic, atraumatic   CHEST/LUNG: Clear to auscultation bilaterally; No rales, rhonchi, wheezing  HEART: Regular rate and rhythm; No murmurs, rubs, or gallops  ABDOMEN: Soft, Nontender, Nondistended; Bowel sounds present, no rebound   EXTREMITIES:  2+ Peripheral Pulses, No clubbing, cyanosis,   arm edema    SKIN:open wound and cellulitis left arm   BACK: no pressor sore   NERVOUS SYSTEM:  Alert & Oriented X3, Good concentration  PSYCH: normal affect     LABS:                         9.0    4.76  )-----------( 243      ( 08 Jun 2019 23:32 )             28.4     06-08    145  |  115<H>  |  31<H>  ----------------------------<  90  4.1   |  22  |  1.22    Ca    8.3<L>      08 Jun 2019 23:32                      Rapid HIV-1/2 Antibody: Nonreact (06-07 @ 14:55)      Vancomycin Level, Trough: 24.8 ug/mL (06-10 @ 11:51)        Culture Results:   No growth to date. (06-09 @ 12:21)  Culture Results:   No growth to date. (06-09 @ 01:45)  Culture Results:   No growth to date. (06-07 @ 23:09)  Culture Results:   No growth (06-07 @ 23:02)  Culture Results:   No growth at 48 hours (06-07 @ 01:21)  Culture Results:   No growth (06-07 @ 01:18)  Culture Results:   Growth in aerobic bottle: Yeast like cells (06-06 @ 13:03)  Culture Results:   Growth in aerobic bottle: Candida orthopsilosis  Fungal ELIO Interpretive Guidelines:  SDD*= Sensitive dose dependent  AMPHOTERICIN B= N/A; No interpretive guidelines available  FLUCONAZOLE= Data established for mucosal disease, and  is generally applicable for invasive disease.  Susceptibility is dependent on achieving  the maximal possible blood level.  Doses of 400 MG/day or more may be required in adults with  normal renal function and body habitus.  ITRACONAZOLE= Data apply only to mucosal disease.  Susceptibility is dependent on achieving  the maximal possible blood level.  Measure to assure drug absorption and  Itraconazole plasma concentrations of > 0.5 MCG/ML may  be required for optimal response.  FLUCYTOSINE= Breakpoints are basedon available clinical and  pharmacokinetic data.  VORICONAZOLE: The ELIO has been determined by the  colormetric microdilution method.  This test is not approved  by the FDA and is for research use only.  The performance  characteristics of this assayhave been established by [employer] and [facility], [place], [place].  Voriconazole has been approved by the FDA for primary  therapy for invasive Aspergillosis and as salvage  therapy for treatment of Scedosporium apiospermum and  Fusariosis.  CASPOFUNGIN= Is indicated in the treatment of candidemia,  intra-abdominal abscess, peritonitis, pleural space  infections and esophageal candidiasis.  Standardized  susceptability testing methods for Echinocandins have not  been establishedfor yeast and filamentous fungi, and  results of susceptibility studies do not necessarily  correlate with clinical outcomes. (06-06 @ 13:03)  Culture Results:   Growth in aerobic bottle: Candida orthopsilosis  See previous culture [id number]  "Due to technical problems, Proteus sp. will Not be reported as part of  the BCID panel until further notice"  ***Blood Panel PCR results on this specimen are available  approximately 3 hours after the Gram stain result.***  Gram stain, PCR, and/or culture results may not always  correspond due to difference in methodologies.  ************************************************************  This PCR assay was performed using [employer].  The following targets are tested for: Enterococcus,  vancomycin resistant enterococci, Listeria monocytogenes,  coagulase negative staphylococci, S. aureus,  methicillin resistant S. aureus, Streptococcus agalactiae  (Group B), S. pneumoniae, S. pyogenes (Group A),  Acinetobacter baumannii, Enterobacter cloacae, E. coli,  Klebsiella oxytoca, K. pneumoniae, Proteus sp.,  Serratia marcescens, Haemophilus influenzae,  Neisseria meningitidis, Pseudomonas aeruginosa, Candida  albicans, C. glabrata, C krusei, C parapsilosis,  C. tropicalis and the KPC resistance gene. (06-05 @ 13:19)  Culture Results:   Growth in aerobic bottle: Coag Negative Staphylococcus  Single set isolate, possible contaminant. Contact  Microbiology if susceptibility testing clinically  indicated.  "Due to technical problems, Proteus sp. will Not be reported as part of  the BCID panel until further notice"  ***Blood Panel PCR results on this specimen are available  approximately 3 hours after the Gram stain result.***  Gram stain, PCR, and/or culture results may not always  correspond due to difference in methodologies.  ************************************************************  This PCR assay was performed using [employer].  The following targets are tested for: Enterococcus,  vancomycin resistant enterococci, Listeria monocytogenes,  coagulase negative staphylococci, S. aureus,  methicillin resistant S. aureus, Streptococcus agalactiae  (Group B), S. pneumoniae, S. pyogenes (Group A),  Acinetobacter baumannii, Enterobacter cloacae, E. coli,  Klebsiella oxytoca, K. pneumoniae, Proteus sp.,  Serratia marcescens, Haemophilus influenzae,  Neisseria meningitidis, Pseudomonas aeruginosa, Candida  albicans, C. glabrata, C krusei, C parapsilosis,  C. tropicalis and the KPC resistance gene. (06-05 @ 13:19)                Radiology:

## 2019-06-10 NOTE — BEHAVIORAL HEALTH ASSESSMENT NOTE - HPI (INCLUDE ILLNESS QUALITY, SEVERITY, DURATION, TIMING, CONTEXT, MODIFYING FACTORS, ASSOCIATED SIGNS AND SYMPTOMS)
40 yo male, has a girlfriend, noncaregiver, admitted for Draining wound left forearm with CT 6/5 showing [id number] abscess with I & D on 6/7/19. Patient has a history of IV drug use (heroin), has not been using x 5 years and restarted 2-3 months ago and is currently using  2-3 times a day. Patient was maintained on Suboxone and stopped it 2 weeks or more ago; last saw his addiction doctors 3 years ago. UTOX "+" for opiate, THC and cocaine on admission     ISTOP Reference #: [id number] ; no record of Patient; CVM no record of Patient 38 yo  male, has a fiancee, noncaregiver, admitted for Draining wound left forearm with CT 6/5 showing [id number] abscess with I & D on 6/7/19. Patient has a history of IV drug use (heroin), has not been using x 5 years and restarted 2-3 months ago and is currently using  2-3 times a day. Patient was maintained on Suboxone and stopped it 2 weeks or more ago, has methadone at home for withdrawal sxs; who relapsed and injected heroin and developed an injection site infection. UTOX "+" for opiate, THC and cocaine on admission. Otherwise, Pt has no formal psych history - no psych admissions/aggression/violence/suicide attempts.     ISTOP Reference #: [id number] ; no record of Patient; CVM no record of Patient    patient is calm, cooperative, pleasant and polite and seemingly a reliable historian. Patient reports that he relapsed on heroin because of stress (getting , lost a friend recently) and wanted to "take the edge of." Patient states that he has been on methadone for withdrawal sxs before but not for maintenance. He was on Suboxone for years and felt well on it to the point he was sober for 1.5 years. patient at this time denies withdrawal symptoms. His only complaint is poor sleep last night. Patient is planning on returning to his Suboxone clinic after this stay. patient is very knowledgeable about Suboxone and knows there should be moderate withdrawal for effective induction which is done at the prescriber's office. At this time, he endorses stable euthymic mood and denies any symptoms of hypomania/raghu/psychosis/major depression/ anxiety/panic. Denies any active or passive suicidal or homicidal ideation. Names protective factors (lazaro; family; hope for future).     Patient asked that his parents/ fiancee be not informed of his relapse/drug use. Family was at bedside so asked to step outside and door was closed. As per Pt's wishes, no discussion / collateral from family.

## 2019-06-11 LAB
ANION GAP SERPL CALC-SCNC: 7 MMOL/L — SIGNIFICANT CHANGE UP (ref 5–17)
BUN SERPL-MCNC: 13 MG/DL — SIGNIFICANT CHANGE UP (ref 7–23)
CALCIUM SERPL-MCNC: 8.4 MG/DL — LOW (ref 8.5–10.1)
CHLORIDE SERPL-SCNC: 113 MMOL/L — HIGH (ref 96–108)
CO2 SERPL-SCNC: 24 MMOL/L — SIGNIFICANT CHANGE UP (ref 22–31)
CREAT SERPL-MCNC: 1.04 MG/DL — SIGNIFICANT CHANGE UP (ref 0.5–1.3)
FERRITIN SERPL-MCNC: 219 NG/ML — SIGNIFICANT CHANGE UP (ref 30–400)
GLUCOSE SERPL-MCNC: 105 MG/DL — HIGH (ref 70–99)
HCT VFR BLD CALC: 26.6 % — LOW (ref 39–50)
HGB BLD-MCNC: 8.6 G/DL — LOW (ref 13–17)
IRON SATN MFR SERPL: 28 % — SIGNIFICANT CHANGE UP (ref 16–55)
IRON SATN MFR SERPL: 65 UG/DL — SIGNIFICANT CHANGE UP (ref 45–165)
MAGNESIUM SERPL-MCNC: 2.1 MG/DL — SIGNIFICANT CHANGE UP (ref 1.6–2.6)
MCHC RBC-ENTMCNC: 25.4 PG — LOW (ref 27–34)
MCHC RBC-ENTMCNC: 32.3 GM/DL — SIGNIFICANT CHANGE UP (ref 32–36)
MCV RBC AUTO: 78.7 FL — LOW (ref 80–100)
NRBC # BLD: 0 /100 WBCS — SIGNIFICANT CHANGE UP (ref 0–0)
OB PNL STL: NEGATIVE — SIGNIFICANT CHANGE UP
PHOSPHATE SERPL-MCNC: 3.2 MG/DL — SIGNIFICANT CHANGE UP (ref 2.5–4.5)
PLATELET # BLD AUTO: 226 K/UL — SIGNIFICANT CHANGE UP (ref 150–400)
POTASSIUM SERPL-MCNC: 4 MMOL/L — SIGNIFICANT CHANGE UP (ref 3.5–5.3)
POTASSIUM SERPL-SCNC: 4 MMOL/L — SIGNIFICANT CHANGE UP (ref 3.5–5.3)
RBC # BLD: 3.38 M/UL — LOW (ref 4.2–5.8)
RBC # FLD: 12.5 % — SIGNIFICANT CHANGE UP (ref 10.3–14.5)
SODIUM SERPL-SCNC: 144 MMOL/L — SIGNIFICANT CHANGE UP (ref 135–145)
SURGICAL PATHOLOGY STUDY: SIGNIFICANT CHANGE UP
TIBC SERPL-MCNC: 230 UG/DL — SIGNIFICANT CHANGE UP (ref 220–430)
UIBC SERPL-MCNC: 165 UG/DL — SIGNIFICANT CHANGE UP (ref 110–370)
WBC # BLD: 5.03 K/UL — SIGNIFICANT CHANGE UP (ref 3.8–10.5)
WBC # FLD AUTO: 5.03 K/UL — SIGNIFICANT CHANGE UP (ref 3.8–10.5)

## 2019-06-11 PROCEDURE — 99233 SBSQ HOSP IP/OBS HIGH 50: CPT

## 2019-06-11 RX ORDER — FUROSEMIDE 40 MG
40 TABLET ORAL ONCE
Refills: 0 | Status: COMPLETED | OUTPATIENT
Start: 2019-06-11 | End: 2019-06-11

## 2019-06-11 RX ORDER — METHADONE HYDROCHLORIDE 40 MG/1
5 TABLET ORAL EVERY 4 HOURS
Refills: 0 | Status: DISCONTINUED | OUTPATIENT
Start: 2019-06-11 | End: 2019-06-18

## 2019-06-11 RX ADMIN — Medication 1 PATCH: at 19:00

## 2019-06-11 RX ADMIN — AMLODIPINE BESYLATE 10 MILLIGRAM(S): 2.5 TABLET ORAL at 06:10

## 2019-06-11 RX ADMIN — ZOLPIDEM TARTRATE 10 MILLIGRAM(S): 10 TABLET ORAL at 21:17

## 2019-06-11 RX ADMIN — Medication 250 MILLIGRAM(S): at 21:14

## 2019-06-11 RX ADMIN — Medication 250 MILLIGRAM(S): at 06:10

## 2019-06-11 RX ADMIN — MICAFUNGIN SODIUM 100 MILLIGRAM(S): 100 INJECTION, POWDER, LYOPHILIZED, FOR SOLUTION INTRAVENOUS at 18:54

## 2019-06-11 RX ADMIN — Medication 1 PATCH: at 11:14

## 2019-06-11 RX ADMIN — Medication 250 MILLIGRAM(S): at 14:46

## 2019-06-11 RX ADMIN — METHADONE HYDROCHLORIDE 5 MILLIGRAM(S): 40 TABLET ORAL at 20:47

## 2019-06-11 RX ADMIN — Medication 1 PATCH: at 11:12

## 2019-06-11 RX ADMIN — Medication 40 MILLIGRAM(S): at 18:42

## 2019-06-11 RX ADMIN — LISINOPRIL 10 MILLIGRAM(S): 2.5 TABLET ORAL at 06:10

## 2019-06-11 NOTE — PROGRESS NOTE ADULT - ASSESSMENT
intravenous drug addict suppurative thrombo phlebitis   cns candida   micro final blood culture is cns   dc zosyn   resume mycakmine as need ALEJANDRO   if more blood culture NEGATIVE and ALEJANDRO NEGATIVE discharge plan on po diflucan   await more blood culture   no murmur   no stigmata of infectious endocarditis

## 2019-06-11 NOTE — PROGRESS NOTE ADULT - SUBJECTIVE AND OBJECTIVE BOX
39 year old male presents today for follow up, pt presents with a draining wound on his left forearm, pt was seen yesterday but did not want to stay, he did want to try oral antibiotics, he does admit to heroin use prior to his symptoms, he did have fever on Saturday and Sunday then swelling which worsened, he denies tenderness +drainage of pus today - denies local pain - OF note -## he does not want anybody especially his fiance to know about his drug use## (06 Jun 2019 12:25)  here polymicrobial bacteremia and fungemia  another pos blood culture for yeast today   discussed with dr mendiola    Allergies    No Known Allergies    Intolerances        MEDICATIONS  (STANDING):  amLODIPine   Tablet 10 milliGRAM(s) Oral daily  furosemide    Tablet 40 milliGRAM(s) Oral once  lisinopril 10 milliGRAM(s) Oral daily  micafungin IVPB      micafungin IVPB 150 milliGRAM(s) IV Intermittent every 24 hours  nicotine - 21 mG/24Hr(s) Patch 1 patch Transdermal daily  vancomycin  IVPB 750 milliGRAM(s) IV Intermittent every 8 hours  zolpidem 10 milliGRAM(s) Oral at bedtime    MEDICATIONS  (PRN):  acetaminophen   Tablet .. 650 milliGRAM(s) Oral every 6 hours PRN Mild Pain (1 - 3)  clonazePAM  Tablet 0.5 milliGRAM(s) Oral two times a day PRN anxiety  methadone    Tablet 5 milliGRAM(s) Oral every 4 hours PRN opiate cravings or severe pain      REVIEW OF SYSTEMS:    feels well    VITAL SIGNS:  T(C): 37 (06-11-19 @ 17:37), Max: 37 (06-11-19 @ 17:37)  T(F): 98.6 (06-11-19 @ 17:37), Max: 98.6 (06-11-19 @ 17:37)  HR: 88 (06-11-19 @ 17:37) (66 - 88)  BP: 164/98 (06-11-19 @ 17:37) (152/91 - 164/98)  RR: 18 (06-11-19 @ 17:37) (17 - 18)  SpO2: 99% (06-11-19 @ 17:37) (98% - 99%)  Wt(kg): --    PHYSICAL EXAM:    GENERAL: not in any distress  HEENT: Neck is supple, normocephalic, atraumatic   CHEST/LUNG: Clear to auscultation bilaterally; No rales, rhonchi, wheezing  HEART: Regular rate and rhythm; No murmurs, rubs, or gallops  ABDOMEN: Soft, Nontender, Nondistended; Bowel sounds present, no rebound   EXTREMITIES:  2+ Peripheral Pulses, No clubbing, cyanosis, or edemaS  arm continues to improve  NERVOUS SYSTEM:  Alert & Oriented X3, Good concentration  PSYCH: normal affect     LABS:                         8.6    5.03  )-----------( 226      ( 11 Jun 2019 07:35 )             26.6     06-11    144  |  113<H>  |  13  ----------------------------<  105<H>  4.0   |  24  |  1.04    Ca    8.4<L>      11 Jun 2019 07:35  Phos  3.2     06-11  Mg     2.1     06-11                      Rapid HIV-1/2 Antibody: Nonreact (06-07 @ 14:55)      Vancomycin Level, Trough: 24.8 ug/mL (06-10 @ 11:51)        Culture Results:   No growth to date. (06-09 @ 12:21)  Culture Results:   No growth to date. (06-09 @ 01:45)  Culture Results:   No growth to date. (06-07 @ 23:09)  Culture Results:   No growth (06-07 @ 23:02)  Culture Results:   No growth at 48 hours (06-07 @ 01:21)  Culture Results:   No growth (06-07 @ 01:18)  Culture Results:   Growth in aerobic bottle: Candida orthopsilosis  See previous culture [id number] (06-06 @ 13:03)  Culture Results:   Growth in aerobic bottle: Candida orthopsilosis  Fungal ELIO Interpretive Guidelines:  SDD*= Sensitive dose dependent  AMPHOTERICIN B= N/A; No interpretive guidelines available  FLUCONAZOLE= Data established for mucosal disease, and  is generally applicable for invasive disease.  Susceptibility is dependent on achieving  the maximal possible blood level.  Doses of 400 MG/day or more may be required in adults with  normal renal function and body habitus.  ITRACONAZOLE= Data apply only to mucosal disease.  Susceptibility is dependent on achieving  the maximal possible blood level.  Measure to assure drug absorption and  Itraconazole plasma concentrations of > 0.5 MCG/ML may  be required for optimal response.  FLUCYTOSINE= Breakpoints are basedon available clinical and  pharmacokinetic data.  VORICONAZOLE: The ELIO has been determined by the  colormetric microdilution method.  This test is not approved  by the FDA and is for research use only.  The performance  characteristics of this assayhave been established by [employer] and [facility], [place], [place].  Voriconazole has been approved by the FDA for primary  therapy for invasive Aspergillosis and as salvage  therapy for treatment of Scedosporium apiospermum and  Fusariosis.  CASPOFUNGIN= Is indicated in the treatment of candidemia,  intra-abdominal abscess, peritonitis, pleural space  infections and esophageal candidiasis.  Standardized  susceptability testing methods for Echinocandins have not  been establishedfor yeast and filamentous fungi, and  results of susceptibility studies do not necessarily  correlate with clinical outcomes. (06-06 @ 13:03)  Culture Results:   Growth in aerobic bottle: Candida orthopsilosis  See previous culture [id number]  "Due to technical problems, Proteus sp. will Not be reported as part of  the BCID panel until further notice"  ***Blood Panel PCR results on this specimen are available  approximately 3 hours after the Gram stain result.***  Gram stain, PCR, and/or culture results may not always  correspond due to difference in methodologies.  ************************************************************  This PCR assay was performed using [employer].  The following targets are tested for: Enterococcus,  vancomycin resistant enterococci, Listeria monocytogenes,  coagulase negative staphylococci, S. aureus,  methicillin resistant S. aureus, Streptococcus agalactiae  (Group B), S. pneumoniae, S. pyogenes (Group A),  Acinetobacter baumannii, Enterobacter cloacae, E. coli,  Klebsiella oxytoca, K. pneumoniae, Proteus sp.,  Serratia marcescens, Haemophilus influenzae,  Neisseria meningitidis, Pseudomonas aeruginosa, Candida  albicans, C. glabrata, C krusei, C parapsilosis,  C. tropicalis and the KPC resistance gene. (06-05 @ 13:19)  Culture Results:   Growth in aerobic bottle: Coag Negative Staphylococcus  Single set isolate, possible contaminant. Contact  Microbiology if susceptibility testing clinically  indicated.  "Due to technical problems, Proteus sp. will Not be reported as part of  the BCID panel until further notice"  ***Blood Panel PCR results on this specimen are available  approximately 3 hours after the Gram stain result.***  Gram stain, PCR, and/or culture results may not always  correspond due to difference in methodologies.  ************************************************************  This PCR assay was performed using [employer].  The following targets are tested for: Enterococcus,  vancomycin resistant enterococci, Listeria monocytogenes,  coagulase negative staphylococci, S. aureus,  methicillin resistant S. aureus, Streptococcus agalactiae  (Group B), S. pneumoniae, S. pyogenes (Group A),  Acinetobacter baumannii, Enterobacter cloacae, E. coli,  Klebsiella oxytoca, K. pneumoniae, Proteus sp.,  Serratia marcescens, Haemophilus influenzae,  Neisseria meningitidis, Pseudomonas aeruginosa, Candida  albicans, C. glabrata, C krusei, C parapsilosis,  C. tropicalis and the KPC resistance gene. (06-05 @ 13:19)                Radiology:

## 2019-06-11 NOTE — PROGRESS NOTE ADULT - SUBJECTIVE AND OBJECTIVE BOX
INTERVAL HPI/OVERNIGHT EVENTS:     Patient seen and examined. s/p I and D of Lt arm abscess, tolerated procedure well.    No events over night.      MEDICATIONS  (STANDING):  amLODIPine   Tablet 10 milliGRAM(s) Oral daily  lisinopril 10 milliGRAM(s) Oral daily  micafungin IVPB      micafungin IVPB 150 milliGRAM(s) IV Intermittent every 24 hours  nicotine - 21 mG/24Hr(s) Patch 1 patch Transdermal daily  vancomycin  IVPB 750 milliGRAM(s) IV Intermittent every 8 hours  zolpidem 10 milliGRAM(s) Oral at bedtime    MEDICATIONS  (PRN):  acetaminophen   Tablet .. 650 milliGRAM(s) Oral every 6 hours PRN Mild Pain (1 - 3)  clonazePAM  Tablet 0.5 milliGRAM(s) Oral two times a day PRN anxiety  methadone    Tablet 5 milliGRAM(s) Oral every 4 hours PRN Opiate Withdrawal w/ CINA>9    Vital Signs Last 24 Hrs  T(C): 36.2 (11 Jun 2019 05:44), Max: 36.5 (10 David 2019 11:24)  T(F): 97.2 (11 Jun 2019 05:44), Max: 97.7 (10 David 2019 11:24)  HR: 66 (11 Jun 2019 05:44) (66 - 100)  BP: 155/98 (11 Jun 2019 05:44) (152/83 - 181/97)  BP(mean): --  RR: 18 (11 Jun 2019 05:44) (17 - 18)  SpO2: 98% (11 Jun 2019 05:44) (98% - 100%)      PHYSICAL EXAM:    GENERAL: NAD, well-groomed, well-developed, awake, alert, oriented x 3, fluent and coherent speech  HEAD:  Atraumatic, Normocephalic  EYES: EOMI, conjunctiva and sclera clear  NECK: Supple  NERVOUS SYSTEM:  Good concentration; Moving all 4 extremities; No gross sensory deficits, No facial droop  CHEST WALL: No masses  CHEST/LUNG: Clear to auscultation bilaterally; No rales, rhonchi, wheezing, or rubs  HEART: Regular rate and rhythm; No murmurs, rubs, or gallops  ABDOMEN: Soft, Nontender, Nondistended, Bowel sounds present, No palpable masses or organomegaly, No bruits  EXTREMITIES:  left arm forearm +  induration , mild redness, no pain s/p i and d  SKIN: No rashes or lesions                      LABS:                                       8.6    5.03  )-----------( 226      ( 11 Jun 2019 07:35 )             26.6   06-11    144  |  113<H>  |  13  ----------------------------<  105<H>  4.0   |  24  |  1.04    Ca    8.4<L>      11 Jun 2019 07:35  Phos  3.2     06-11  Mg     2.1     06-11             RADIOLOGY & ADDITIONAL TESTS:

## 2019-06-11 NOTE — PROGRESS NOTE ADULT - SUBJECTIVE AND OBJECTIVE BOX
39 year old male presents today for follow up, pt presents with a draining wound on his left forearm, pt was seen yesterday but did not want to stay, he did want to try oral antibiotics, he does admit to heroin use prior to his symptoms, he did have fever on Saturday and  then swelling which worsened, he denies tenderness +drainage of pus today - denies local pain - OF note -## he does not want anybody especially his fiance to know about his drug use## (2019 12:25)      Chief Complaint:  Patient is a 39y old  Male who presents with a chief complaint of cellultis (10 David 2019 19:57)      Review of Systems:    General:  No wt loss, fevers, chills, night sweats  Eyes:  Good vision, no reported pain  ENT:  No sore throat, pain, runny nose, dysphagia  CV:  No pain, palpitations, hypo/hypertension  Resp:  No dyspnea, cough, tachypnea, wheezing  GI:  No pain, nausea, vomiting, diarrhea, constipation  :  No pain, bleeding, incontinence, nocturia           Social History/Family History  SOCHX:   tobacco,  -  alcohol    FMHX: FA/MO  - contributory       Discussed with:  PMD, Family    Physical Exam:    Vital Signs:  Vital Signs Last 24 Hrs  T(C): 36.2 (2019 05:44), Max: 36.5 (10 David 2019 11:24)  T(F): 97.2 (2019 05:44), Max: 97.7 (10 David 2019 11:24)  HR: 66 (2019 05:44) (66 - 100)  BP: 155/98 (2019 05:44) (152/83 - 181/97)  BP(mean): --  RR: 18 (2019 05:44) (17 - 18)  SpO2: 98% (2019 05:44) (98% - 100%)  Daily     Daily Weight in k.4 (2019 05:44)  I&O's Summary    2019 07:01  -  10 David 2019 07:00  --------------------------------------------------------  IN: 2030 mL / OUT: 0 mL / NET: 2030 mL          Chest:  Full & symmetric excursion, no increased effort, breath sounds clear  Cardiovascular:  Regular rhythm, S1, S2, no murmur/rub/S3/S4,  Abdomen:  Soft, non-tender, non-distended, normoactive bowel sounds, no HSM        Assessment:  Bacteremia  Asked to assess for ALEJANDRO  TTE noted  I will schedule a ALEJANDRO this week, Thursday or friday

## 2019-06-11 NOTE — PROGRESS NOTE ADULT - PROBLEM SELECTOR PLAN 1
s/p I and D by plastics.  Blood cultures positive for Candida and MRSA, continue with Vanc ( day 6),  and     micfungin ( day 2)   was on Diflucan but blood cx resulted candida again.    per long verbal d/w ID will need ALEJANDRO  ( consulted Del pirore) and to await blood cultures from 6/8, 6/9 and 6/10 to be negative before any discussion about d/c .   HIV non reactive.   Wound cultures negative .

## 2019-06-11 NOTE — PROGRESS NOTE BEHAVIORAL HEALTH - NSBHFUPINTERVALHXFT_PSY_A_CORE
Patient slept better on the Ambien and had a few more hours of sleep than before. He has no complaints. Denies severe pain. Eating well; appetite is good. Denies withdrawal symptoms and also does not physically manifest any. Going for a ALEJANDRO later today. His family are coming to visit and are supportive. At this time, he endorses stable euthymic mood and denies any symptoms of hypomania/raghu/psychosis/major depression/ anxiety/panic. Denies any active or passive suicidal or homicidal ideation. Names protective factors (alzaro; family; hope for future). looks forward to getting  in 2 weeks.

## 2019-06-12 DIAGNOSIS — D64.9 ANEMIA, UNSPECIFIED: ICD-10-CM

## 2019-06-12 DIAGNOSIS — R60.0 LOCALIZED EDEMA: ICD-10-CM

## 2019-06-12 LAB
CULTURE RESULTS: SIGNIFICANT CHANGE UP
FOLATE SERPL-MCNC: 10.3 NG/ML — SIGNIFICANT CHANGE UP
HAV IGM SER-ACNC: SIGNIFICANT CHANGE UP
HBV CORE IGM SER-ACNC: SIGNIFICANT CHANGE UP
HBV SURFACE AG SER-ACNC: SIGNIFICANT CHANGE UP
HCV AB S/CO SERPL IA: 0.18 S/CO — SIGNIFICANT CHANGE UP (ref 0–0.99)
HCV AB SERPL-IMP: SIGNIFICANT CHANGE UP
PREALB SERPL-MCNC: 19 MG/DL — LOW (ref 20–40)
SPECIMEN SOURCE: SIGNIFICANT CHANGE UP
TSH SERPL-MCNC: 0.03 UU/ML — LOW (ref 0.36–3.74)
VANCOMYCIN TROUGH SERPL-MCNC: 17 UG/ML — SIGNIFICANT CHANGE UP (ref 10–20)
VIT B12 SERPL-MCNC: 1200 PG/ML — SIGNIFICANT CHANGE UP (ref 232–1245)

## 2019-06-12 PROCEDURE — 93970 EXTREMITY STUDY: CPT | Mod: 26

## 2019-06-12 PROCEDURE — 99233 SBSQ HOSP IP/OBS HIGH 50: CPT

## 2019-06-12 RX ORDER — FUROSEMIDE 40 MG
20 TABLET ORAL ONCE
Refills: 0 | Status: COMPLETED | OUTPATIENT
Start: 2019-06-12 | End: 2019-06-12

## 2019-06-12 RX ORDER — LISINOPRIL 2.5 MG/1
10 TABLET ORAL ONCE
Refills: 0 | Status: COMPLETED | OUTPATIENT
Start: 2019-06-12 | End: 2019-06-12

## 2019-06-12 RX ORDER — LISINOPRIL 2.5 MG/1
20 TABLET ORAL DAILY
Refills: 0 | Status: DISCONTINUED | OUTPATIENT
Start: 2019-06-13 | End: 2019-06-14

## 2019-06-12 RX ADMIN — Medication 1 PATCH: at 11:14

## 2019-06-12 RX ADMIN — Medication 1 PATCH: at 19:43

## 2019-06-12 RX ADMIN — Medication 250 MILLIGRAM(S): at 23:39

## 2019-06-12 RX ADMIN — Medication 1 PATCH: at 11:02

## 2019-06-12 RX ADMIN — AMLODIPINE BESYLATE 10 MILLIGRAM(S): 2.5 TABLET ORAL at 11:02

## 2019-06-12 RX ADMIN — METHADONE HYDROCHLORIDE 5 MILLIGRAM(S): 40 TABLET ORAL at 11:14

## 2019-06-12 RX ADMIN — Medication 1 PATCH: at 07:34

## 2019-06-12 RX ADMIN — MICAFUNGIN SODIUM 100 MILLIGRAM(S): 100 INJECTION, POWDER, LYOPHILIZED, FOR SOLUTION INTRAVENOUS at 18:56

## 2019-06-12 RX ADMIN — ZOLPIDEM TARTRATE 10 MILLIGRAM(S): 10 TABLET ORAL at 22:10

## 2019-06-12 RX ADMIN — LISINOPRIL 10 MILLIGRAM(S): 2.5 TABLET ORAL at 11:00

## 2019-06-12 RX ADMIN — METHADONE HYDROCHLORIDE 5 MILLIGRAM(S): 40 TABLET ORAL at 22:10

## 2019-06-12 RX ADMIN — Medication 250 MILLIGRAM(S): at 17:21

## 2019-06-12 RX ADMIN — Medication 250 MILLIGRAM(S): at 10:56

## 2019-06-12 RX ADMIN — METHADONE HYDROCHLORIDE 5 MILLIGRAM(S): 40 TABLET ORAL at 17:23

## 2019-06-12 RX ADMIN — Medication 20 MILLIGRAM(S): at 17:21

## 2019-06-12 NOTE — PROGRESS NOTE ADULT - ASSESSMENT
intravenous drug addict suppurative thrombo phlebitis   cns candida   micro final blood culture is cns   dc zosyn   resume mycakmine as need ALEJANDRO   if more blood culture NEGATIVE and ALEJANDRO NEGATIVE discharge plan on po diflucan   await more blood culture   no murmur   no stigmata of infectious endocarditis   await ALEJANDRO  discussed with micro again

## 2019-06-12 NOTE — PROGRESS NOTE ADULT - SUBJECTIVE AND OBJECTIVE BOX
HPI:  39 year old male presents today for follow up, pt presents with a draining wound on his left forearm, pt was seen yesterday but did not want to stay, he did want to try oral antibiotics, he does admit to heroin use prior to his symptoms, he did have fever on Saturday and Sunday then swelling which worsened, he denies tenderness +drainage of pus today - denies local pain -pt   has told his fiancee and wedding is on hold     Allergies    No Known Allergies    Intolerances        MEDICATIONS  (STANDING):  amLODIPine   Tablet 10 milliGRAM(s) Oral daily  micafungin IVPB      micafungin IVPB 150 milliGRAM(s) IV Intermittent every 24 hours  nicotine - 21 mG/24Hr(s) Patch 1 patch Transdermal daily  vancomycin  IVPB 750 milliGRAM(s) IV Intermittent every 8 hours  zolpidem 10 milliGRAM(s) Oral at bedtime    MEDICATIONS  (PRN):  acetaminophen   Tablet .. 650 milliGRAM(s) Oral every 6 hours PRN Mild Pain (1 - 3)  methadone    Tablet 5 milliGRAM(s) Oral every 4 hours PRN opiate cravings or severe pain      REVIEW OF SYSTEMS:  much better   CONSTITUTIONAL: No fever, chills, weight loss, or fatigue  HEENT: No sore throat, runny nose, ear ache  RESPIRATORY: No cough, wheezing, No shortness of breath  CARDIOVASCULAR: No chest pain, palpitations, dizziness  GASTROINTESTINAL: No abdominal pain. No nausea, vomiting, diarrhea  GENITOURINARY: No dysuria, increase frequency, hematuria, or incontinence  NEUROLOGICAL: No headaches, memory loss, loss of strength, numbness, or tremors, no weakness  EXTREMITY: pain arm   SKIN: wound arm   has withdrawl signs and symptoms getting methadone    VITAL SIGNS:  T(C): 36.7 (06-12-19 @ 18:34), Max: 36.7 (06-12-19 @ 10:57)  T(F): 98 (06-12-19 @ 18:34), Max: 98.1 (06-12-19 @ 10:57)  HR: 63 (06-12-19 @ 18:34) (63 - 101)  BP: 144/87 (06-12-19 @ 18:34) (144/87 - 179/95)  RR: 18 (06-12-19 @ 18:34) (18 - 18)  SpO2: 97% (06-12-19 @ 18:34) (97% - 99%)  Wt(kg): --    PHYSICAL EXAM:    GENERAL: not in any distress  HEENT: Neck is supple, normocephalic, atraumatic   CHEST/LUNG: Clear to auscultation bilaterally; No rales, rhonchi, wheezing  HEART: Regular rate and rhythm; No murmurs, rubs, or gallops  ABDOMEN: Soft, Nontender, Nondistended; Bowel sounds present, no rebound   EXTREMITIES:  2+ Peripheral Pulses, No clubbing, cyanosis,   arm is better   no stigmata of infectious endocarditis   SKIN: No rashes or lesions  BACK: no pressor sore   NERVOUS SYSTEM:  Alert & Oriented X3, Good concentration  PSYCH: normal affect     LABS:                         8.6    5.03  )-----------( 226      ( 11 Jun 2019 07:35 )             26.6     06-11    144  |  113<H>  |  13  ----------------------------<  105<H>  4.0   |  24  |  1.04    Ca    8.4<L>      11 Jun 2019 07:35  Phos  3.2     06-11  Mg     2.1     06-11                  Thyroid Stimulating Hormone, Serum: 0.029 uU/mL (06-12 @ 07:16)          Vancomycin Level, Trough: 17.0 ug/mL (06-12 @ 07:16)        Culture Results:   No growth to date. (06-09 @ 12:21)  Culture Results:   No growth to date. (06-09 @ 01:45)  Culture Results:   No growth to date. (06-07 @ 23:09)  Culture Results:   Rare Streptococcus intermedius "Susceptibilities not performed" (06-07 @ 23:02)  Culture Results:   No growth at 48 hours (06-07 @ 01:21)  Culture Results:   No growth (06-07 @ 01:18)  Culture Results:   Growth in aerobic bottle: Candida orthopsilosis  See previous culture [id number] (06-06 @ 13:03)  Culture Results:   Growth in aerobic bottle: Candida orthopsilosis  Fungal ELIO Interpretive Guidelines:  SDD*= Sensitive dose dependent  AMPHOTERICIN B= N/A; No interpretive guidelines available  FLUCONAZOLE= Data established for mucosal disease, and  is generally applicable for invasive disease.  Susceptibility is dependent on achieving  the maximal possible blood level.  Doses of 400 MG/day or more may be required in adults with  normal renal function and body habitus.  ITRACONAZOLE= Data apply only to mucosal disease.  Susceptibility is dependent on achieving  the maximal possible blood level.  Measure to assure drug absorption and  Itraconazole plasma concentrations of > 0.5 MCG/ML may  be required for optimal response.  FLUCYTOSINE= Breakpoints are basedon available clinical and  pharmacokinetic data.  VORICONAZOLE: The ELIO has been determined by the  colormetric microdilution method.  This test is not approved  by the FDA and is for research use only.  The performance  characteristics of this assayhave been established by [employer] and [facility], [place], [place].  Voriconazole has been approved by the FDA for primary  therapy for invasive Aspergillosis and as salvage  therapy for treatment of Scedosporium apiospermum and  Fusariosis.  CASPOFUNGIN= Is indicated in the treatment of candidemia,  intra-abdominal abscess, peritonitis, pleural space  infections and esophageal candidiasis.  Standardized  susceptability testing methods for Echinocandins have not  been establishedfor yeast and filamentous fungi, and  results of susceptibility studies do not necessarily  correlate with clinical outcomes. (06-06 @ 13:03)                Radiology:    < from: TTE Echo Doppler w/o Cont (06.07.19 @ 15:53) >    Summary:   1. Left ventricular ejection fraction, by visual estimation, is 60 to   65%.   2. Technically fair study.   3. Normal global left ventricular systolic function.   4. Normal left ventricular internal cavity size.   5. Normal right ventricular size and function.   6. There is no evidence of pericardial effusion.   7. Mild mitral valve regurgitation.   8. Structurally normal mitral valve, with normal leaflet excursion.   9. Estimated pulmonary artery systolic pressure is 38.2 mmHg assuming a   right atrial pressure of 5 mmHg, which is consistent with borderline   pulmonary hypertension.    Rodney Graham MD FACC, FASE, FACP  Electronically signed on 6/8/2019 at 12:35:36 AM         *** Final ***                    RODNEY GRAHAM   This document has been electronically signed. Jun 7 2019  3:53PM          < end of copied text >

## 2019-06-12 NOTE — DIETITIAN INITIAL EVALUATION ADULT. - ORAL INTAKE PTA
good/Breakfast; Cold cereal & cookies or Dao, egg & cheese s/w Lunch & dinner; pasta, chicken soup, vegetable, chicken, veal or pork or fish

## 2019-06-12 NOTE — PROGRESS NOTE ADULT - PROBLEM SELECTOR PLAN 1
s/p I and D by plastics.  Blood cultures positive for Candida and MRSA, continue with Vanc ( day 7),  and     micfungin ( day 3)   was on Diflucan but blood cx resulted candida again.    for ALEJANDRO for Friday per  Gustavo Smith also will need to await blood cultures from 6/8, 6/9 and 6/10 to be negative before any discussion about d/c .   HIV non reactive.   Wound cultures negative .

## 2019-06-12 NOTE — PROGRESS NOTE ADULT - SUBJECTIVE AND OBJECTIVE BOX
INTERVAL HPI/OVERNIGHT EVENTS:     Patient seen and examined. s/p I and D of Lt arm abscess, tolerated procedure well.    No events over night.      MEDICATIONS  (STANDING):  amLODIPine   Tablet 10 milliGRAM(s) Oral daily  lisinopril 10 milliGRAM(s) Oral daily  micafungin IVPB      micafungin IVPB 150 milliGRAM(s) IV Intermittent every 24 hours  nicotine - 21 mG/24Hr(s) Patch 1 patch Transdermal daily  vancomycin  IVPB 750 milliGRAM(s) IV Intermittent every 8 hours  zolpidem 10 milliGRAM(s) Oral at bedtime    MEDICATIONS  (PRN):  acetaminophen   Tablet .. 650 milliGRAM(s) Oral every 6 hours PRN Mild Pain (1 - 3)  clonazePAM  Tablet 0.5 milliGRAM(s) Oral two times a day PRN anxiety  methadone    Tablet 5 milliGRAM(s) Oral every 4 hours PRN opiate cravings or severe pain      Vital Signs Last 24 Hrs  T(C): 36.4 (12 Jun 2019 05:17), Max: 37 (11 Jun 2019 17:37)  T(F): 97.6 (12 Jun 2019 05:17), Max: 98.6 (11 Jun 2019 17:37)  HR: 101 (12 Jun 2019 05:17) (63 - 101)  BP: 155/87 (12 Jun 2019 05:17) (152/91 - 164/98)  BP(mean): --  RR: 18 (12 Jun 2019 05:17) (17 - 18)  SpO2: 99% (12 Jun 2019 05:17) (99% - 99%)    PHYSICAL EXAM:    GENERAL: NAD, well-groomed, well-developed, awake, alert, oriented x 3, fluent and coherent speech  HEAD:  Atraumatic, Normocephalic  EYES: EOMI, conjunctiva and sclera clear  NECK: Supple  NERVOUS SYSTEM:  Good concentration; Moving all 4 extremities; No gross sensory deficits, No facial droop  CHEST WALL: No masses  CHEST/LUNG: Clear to auscultation bilaterally; No rales, rhonchi, wheezing, or rubs  HEART: Regular rate and rhythm; No murmurs, rubs, or gallops  ABDOMEN: Soft, Nontender, Nondistended, Bowel sounds present, No palpable masses or organomegaly, No bruits  EXTREMITIES:  left arm forearm +  induration , mild redness, no pain s/p i and d   edema to lower xetrmities  SKIN: No rashes or lesions                      LABS:                                           RADIOLOGY & ADDITIONAL TESTS:

## 2019-06-12 NOTE — DIETITIAN INITIAL EVALUATION ADULT. - OTHER INFO
Pt seen for LOS. 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 Pt with hx substance abuse (Heroin) & Lt arm abscess s/p Debridement 6/7 & s/p ALEJANDRO 6/12.  Pt tolerating po diet well; pt consumed 75% lunch today ( cheeseburger & fresh fruit). No GI distress. Pt reports last BM x 1(6/12).

## 2019-06-12 NOTE — CONSULT NOTE ADULT - SUBJECTIVE AND OBJECTIVE BOX
Reason for Consultation: Anemia    HPI: Patient is a 39y Male seen on consultatioin for the evaluation and management of Anemia    39 years old male with history of heroine abuse with cellultis failed after one days of outpatient oral antibiotics for admission for at least two days for intravenous antibiotics . pt has grma pos cocci in clusters in blood and admits ot going for bachelor party last week. concern is for IE. Hematology Evaluation called for Anemia, patient denies knowing about anemia in past    PAST MEDICAL & SURGICAL HISTORY:  Substance abuse  No significant past surgical history      MEDICATIONS  (STANDING):  amLODIPine   Tablet 10 milliGRAM(s) Oral daily  lisinopril 10 milliGRAM(s) Oral once  micafungin IVPB      micafungin IVPB 150 milliGRAM(s) IV Intermittent every 24 hours  nicotine - 21 mG/24Hr(s) Patch 1 patch Transdermal daily  vancomycin  IVPB 750 milliGRAM(s) IV Intermittent every 8 hours  zolpidem 10 milliGRAM(s) Oral at bedtime    MEDICATIONS  (PRN):  acetaminophen   Tablet .. 650 milliGRAM(s) Oral every 6 hours PRN Mild Pain (1 - 3)  clonazePAM  Tablet 0.5 milliGRAM(s) Oral two times a day PRN anxiety  methadone    Tablet 5 milliGRAM(s) Oral every 4 hours PRN opiate cravings or severe pain      Allergies    No Known Allergies    Intolerances        SOCIAL HISTORY:    Smoking Status: denies  Alcohol: denies  Marital Status: no  Occupation: yes    FAMILY HISTORY:  No pertinent family history in first degree relatives        Vital Signs Last 24 Hrs  T(C): 36.4 (12 Jun 2019 05:17), Max: 37 (11 Jun 2019 17:37)  T(F): 97.6 (12 Jun 2019 05:17), Max: 98.6 (11 Jun 2019 17:37)  HR: 101 (12 Jun 2019 05:17) (63 - 101)  BP: 155/87 (12 Jun 2019 05:17) (152/91 - 164/98)  BP(mean): --  RR: 18 (12 Jun 2019 05:17) (17 - 18)  SpO2: 99% (12 Jun 2019 05:17) (99% - 99%)    PHYSICAL EXAM:    general - AAO x 3  HEENT - No Icterus  CVS - RRR  RS - AE B/L  Abd - soft, NT  Ext - Pulses +        LABS:                        8.6    5.03  )-----------( 226      ( 11 Jun 2019 07:35 )             26.6     06-11    144  |  113<H>  |  13  ----------------------------<  105<H>  4.0   |  24  |  1.04    Ca    8.4<L>      11 Jun 2019 07:35  Phos  3.2     06-11  Mg     2.1     06-11            Culture - Blood (collected 09 Jun 2019 12:21)  Source: .Blood  Preliminary Report (10 David 2019 13:00):    No growth to date.    Culture - Blood (collected 09 Jun 2019 01:45)  Source: .Blood  Preliminary Report (10 David 2019 02:00):    No growth to date.    Culture - Blood (collected 07 Jun 2019 23:09)  Source: .Blood  Preliminary Report (09 Jun 2019 01:01):    No growth to date.    Culture - Surgical Swab (collected 07 Jun 2019 23:02)  Source: .Surgical Swab LEFT FOREARM WOUND  Preliminary Report (08 Jun 2019 21:54):    No growth    Culture - Other (collected 07 Jun 2019 01:21)  Source: Skin  Final Report (08 Jun 2019 21:27):    No growth at 48 hours    Culture - Urine (collected 07 Jun 2019 01:18)  Source: .Urine  Final Report (07 Jun 2019 18:54):    No growth    Culture - Blood (collected 06 Jun 2019 13:03)  Source: .Blood  Gram Stain (10 David 2019 13:42):    Growth in aerobic bottle: Yeast like cells  Final Report (10 David 2019 13:42):    Growth in aerobic bottle: Candida orthopsilosis    Fungal ELIO Interpretive Guidelines:    SDD*= Sensitive dose dependent    AMPHOTERICIN B= N/A; No interpretive guidelines available    FLUCONAZOLE= Data established for mucosal disease, and    is generally applicable for invasive disease.    Susceptibility is dependent on achieving    the maximal possible blood level.    Doses of 400 MG/day or more may be required in adults with    normal renal function and body habitus.    ITRACONAZOLE= Data apply only to mucosal disease.    Susceptibility is dependent on achieving    the maximal possible blood level.    Measure to assure drug absorption and    Itraconazole plasma concentrations of > 0.5 MCG/ML may    be required for optimal response.    FLUCYTOSINE= Breakpoints are basedon available clinical and    pharmacokinetic data.    VORICONAZOLE: The ELIO has been determined by the    colormetric microdilution method.  This test is not approved    by the FDA and is for research use only.  The performance    characteristics of this assayhave been established by [employer] and [facility], [place], [place].    Voriconazole has been approved by the FDA for primary    therapy for invasive Aspergillosis and as salvage    therapy for treatment of Scedosporium apiospermum and    Fusariosis.    CASPOFUNGIN= Is indicated in the treatment of candidemia,    intra-abdominal abscess, peritonitis, pleural space    infections and esophageal candidiasis.  Standardized    susceptability testing methods for Echinocandins have not    been establishedfor yeast and filamentous fungi, and    results of susceptibility studies do not necessarily    correlate with clinical outcomes.  Organism: Yeast (10 David 2019 13:42)  Organism: Yeast (10 David 2019 13:42)    Culture - Blood (collected 06 Jun 2019 13:03)  Source: .Blood  Gram Stain (10 David 2019 23:22):    Growth in aerobic bottle: Yeast like cells  Final Report (10 David 2019 23:22):    Growth in aerobic bottle: Candida orthopsilosis    See previous culture [id number]    Culture - Blood (collected 05 Jun 2019 13:19)  Source: .Blood  Gram Stain (09 Jun 2019 13:33):    Growth in aerobic bottle: Gram Positive Cocci in Clusters  Final Report (09 Jun 2019 13:33):    Growth in aerobic bottle: Coag Negative Staphylococcus    Single set isolate, possible contaminant. Contact    Microbiology if susceptibility testing clinically    indicated.    "Due to technical problems, Proteus sp. will Not be reported as part of    the BCID panel until further notice"    ***Blood Panel PCR results on this specimen are available    approximately 3 hours after the Gram stain result.***    Gram stain, PCR, and/or culture results may not always    correspond due to difference in methodologies.    ************************************************************    This PCR assay was performed using [employer].    The following targets are tested for: Enterococcus,    vancomycin resistant enterococci, Listeria monocytogenes,    coagulase negative staphylococci, S. aureus,    methicillin resistant S. aureus, Streptococcus agalactiae    (Group B), S. pneumoniae, S. pyogenes (Group A),    Acinetobacter baumannii, Enterobacter cloacae, E. coli,    Klebsiella oxytoca, K. pneumoniae, Proteus sp.,    Serratia marcescens, Haemophilus influenzae,    Neisseria meningitidis, Pseudomonas aeruginosa, Candida    albicans, C. glabrata, C krusei, C parapsilosis,    C. tropicalis and the KPC resistance gene.  Organism: Blood Culture PCR (09 Jun 2019 13:33)  Organism: Blood Culture PCR (09 Jun 2019 13:33)    Culture - Blood (collected 05 Jun 2019 13:19)  Source: .Blood  Gram Stain (10 David 2019 13:46):    Growth in aerobic bottle: Yeast like cells  Final Report (10 David 2019 13:46):    Growth in aerobic bottle: Candida orthopsilosis    See previous culture [id number]    "Due to technical problems, Proteus sp. will Not be reported as part of    the BCID panel until further notice"    ***Blood Panel PCR results on this specimen are available    approximately 3 hours after the Gram stain result.***    Gram stain, PCR, and/or culture results may not always    correspond due to difference in methodologies.    ************************************************************    This PCR assay was performed using [employer].    The following targets are tested for: Enterococcus,    vancomycin resistant enterococci, Listeria monocytogenes,    coagulase negative staphylococci, S. aureus,    methicillin resistant S. aureus, Streptococcus agalactiae    (Group B), S. pneumoniae, S. pyogenes (Group A),    Acinetobacter baumannii, Enterobacter cloacae, E. coli,    Klebsiella oxytoca, K. pneumoniae, Proteus sp.,    Serratia marcescens, Haemophilus influenzae,    Neisseria meningitidis, Pseudomonas aeruginosa, Candida    albicans, C. glabrata, C krusei, C parapsilosis,    C. tropicalis and the KPC resistance gene.  Organism: Blood Culture PCR (10 David 2019 13:46)  Organism: Blood Culture PCR (10 David 2019 13:46)        RADIOLOGY & ADDITIONAL STUDIES:

## 2019-06-12 NOTE — CONSULT NOTE ADULT - PROBLEM SELECTOR RECOMMENDATION 9
- possibly anemia of inflammation in setting of sepsis  - will order anemia work-up  - watch blood counts  - abx per ID  - for ALEJANDRO

## 2019-06-12 NOTE — PROGRESS NOTE BEHAVIORAL HEALTH - NSBHFUPINTERVALHXFT_PSY_A_CORE
patient has not been overusing his PRN methadone - only received 2x. Patient has no complaints, reports less pain over his I & D area, sleeping better with the Ambien, denied any opiate cravings/withdrawal symptoms and reports PRN methadone helps. Patient had a friend coming to visit earlier and they had a nice visit. Patient is looking forward to going home and plans on returning to work once he is medically cleared. . At this time, he endorses stable euthymic mood and denies any symptoms of hypomania/raghu/psychosis/major depression/ anxiety/panic. Denies any active or passive suicidal or homicidal ideation. Names protective factors (lazaro; family; hope for future). looks forward to getting  in 2 weeks. Went for LW [employer] today (normal study). At this time, he endorses stable euthymic mood and denies any symptoms of hypomania/raghu/psychosis/major depression/ anxiety/panic. Denies any active or passive suicidal or homicidal ideation. Names protective factors (lazaro; family; hope for future). looks forward to getting  in 2 weeks and returning to work.

## 2019-06-12 NOTE — DIETITIAN INITIAL EVALUATION ADULT. - PERTINENT LABORATORY DATA
06-11 Na 144   Glu 105   K+ 4.0   Cr 1.04   BUN 13   Phos n/a   Alb 2.6(6/6)   PAB 19 mg/dL<L> Hgb 8.6   Hct 26.6   HgA1C 5%(6/9)     24Hr FS:

## 2019-06-12 NOTE — DIETITIAN INITIAL EVALUATION ADULT. - PERTINENT MEDS FT
acetaminophen   Tablet .. PRN  amLODIPine   Tablet  clonazePAM  Tablet PRN  methadone    Tablet PRN  micafungin IVPB  micafungin IVPB  nicotine - 21 mG/24Hr(s) Patch  vancomycin  IVPB  zolpidem

## 2019-06-13 LAB
ALBUMIN SERPL ELPH-MCNC: 2.6 G/DL — LOW (ref 3.3–5)
ALP SERPL-CCNC: 180 U/L — HIGH (ref 40–120)
ALT FLD-CCNC: 35 U/L — SIGNIFICANT CHANGE UP (ref 12–78)
ANION GAP SERPL CALC-SCNC: 6 MMOL/L — SIGNIFICANT CHANGE UP (ref 5–17)
AST SERPL-CCNC: 20 U/L — SIGNIFICANT CHANGE UP (ref 15–37)
BASOPHILS # BLD AUTO: 0.03 K/UL — SIGNIFICANT CHANGE UP (ref 0–0.2)
BASOPHILS NFR BLD AUTO: 0.4 % — SIGNIFICANT CHANGE UP (ref 0–2)
BILIRUB SERPL-MCNC: 0.5 MG/DL — SIGNIFICANT CHANGE UP (ref 0.2–1.2)
BUN SERPL-MCNC: 11 MG/DL — SIGNIFICANT CHANGE UP (ref 7–23)
CALCIUM SERPL-MCNC: 8.6 MG/DL — SIGNIFICANT CHANGE UP (ref 8.5–10.1)
CHLORIDE SERPL-SCNC: 106 MMOL/L — SIGNIFICANT CHANGE UP (ref 96–108)
CO2 SERPL-SCNC: 31 MMOL/L — SIGNIFICANT CHANGE UP (ref 22–31)
CREAT SERPL-MCNC: 1.1 MG/DL — SIGNIFICANT CHANGE UP (ref 0.5–1.3)
CRP SERPL-MCNC: 1.5 MG/DL — HIGH (ref 0–0.4)
CULTURE RESULTS: SIGNIFICANT CHANGE UP
EOSINOPHIL # BLD AUTO: 0.17 K/UL — SIGNIFICANT CHANGE UP (ref 0–0.5)
EOSINOPHIL NFR BLD AUTO: 2.3 % — SIGNIFICANT CHANGE UP (ref 0–6)
ERYTHROCYTE [SEDIMENTATION RATE] IN BLOOD: 38 MM/HR — HIGH (ref 0–15)
FERRITIN SERPL-MCNC: 230 NG/ML — SIGNIFICANT CHANGE UP (ref 30–400)
FOLATE SERPL-MCNC: 10.5 NG/ML — SIGNIFICANT CHANGE UP
GLUCOSE SERPL-MCNC: 81 MG/DL — SIGNIFICANT CHANGE UP (ref 70–99)
HCT VFR BLD CALC: 27.3 % — LOW (ref 39–50)
HGB BLD-MCNC: 9.2 G/DL — LOW (ref 13–17)
IMM GRANULOCYTES NFR BLD AUTO: 0.7 % — SIGNIFICANT CHANGE UP (ref 0–1.5)
IRON SATN MFR SERPL: 27 % — SIGNIFICANT CHANGE UP (ref 16–55)
IRON SATN MFR SERPL: 63 UG/DL — SIGNIFICANT CHANGE UP (ref 45–165)
LYMPHOCYTES # BLD AUTO: 1.31 K/UL — SIGNIFICANT CHANGE UP (ref 1–3.3)
LYMPHOCYTES # BLD AUTO: 17.7 % — SIGNIFICANT CHANGE UP (ref 13–44)
MAGNESIUM SERPL-MCNC: 1.9 MG/DL — SIGNIFICANT CHANGE UP (ref 1.6–2.6)
MCHC RBC-ENTMCNC: 26.2 PG — LOW (ref 27–34)
MCHC RBC-ENTMCNC: 33.7 GM/DL — SIGNIFICANT CHANGE UP (ref 32–36)
MCV RBC AUTO: 77.8 FL — LOW (ref 80–100)
MONOCYTES # BLD AUTO: 0.68 K/UL — SIGNIFICANT CHANGE UP (ref 0–0.9)
MONOCYTES NFR BLD AUTO: 9.2 % — SIGNIFICANT CHANGE UP (ref 2–14)
NEUTROPHILS # BLD AUTO: 5.18 K/UL — SIGNIFICANT CHANGE UP (ref 1.8–7.4)
NEUTROPHILS NFR BLD AUTO: 69.7 % — SIGNIFICANT CHANGE UP (ref 43–77)
NRBC # BLD: 0 /100 WBCS — SIGNIFICANT CHANGE UP (ref 0–0)
OB PNL STL: POSITIVE
PHOSPHATE SERPL-MCNC: 3.2 MG/DL — SIGNIFICANT CHANGE UP (ref 2.5–4.5)
PLATELET # BLD AUTO: 230 K/UL — SIGNIFICANT CHANGE UP (ref 150–400)
POTASSIUM SERPL-MCNC: 3.9 MMOL/L — SIGNIFICANT CHANGE UP (ref 3.5–5.3)
POTASSIUM SERPL-SCNC: 3.9 MMOL/L — SIGNIFICANT CHANGE UP (ref 3.5–5.3)
PROT SERPL-MCNC: 6 GM/DL — SIGNIFICANT CHANGE UP (ref 6–8.3)
RBC # BLD: 3.51 M/UL — LOW (ref 4.2–5.8)
RBC # BLD: 3.51 M/UL — LOW (ref 4.2–5.8)
RBC # FLD: 12.5 % — SIGNIFICANT CHANGE UP (ref 10.3–14.5)
RETICS #: 70.6 K/UL — SIGNIFICANT CHANGE UP (ref 25–125)
RETICS/RBC NFR: 2 % — SIGNIFICANT CHANGE UP (ref 0.5–2.5)
SODIUM SERPL-SCNC: 143 MMOL/L — SIGNIFICANT CHANGE UP (ref 135–145)
SPECIMEN SOURCE: SIGNIFICANT CHANGE UP
T4 FREE SERPL-MCNC: 1.6 NG/DL — SIGNIFICANT CHANGE UP (ref 0.9–1.8)
TIBC SERPL-MCNC: 236 UG/DL — SIGNIFICANT CHANGE UP (ref 220–430)
UIBC SERPL-MCNC: 173 UG/DL — SIGNIFICANT CHANGE UP (ref 110–370)
VIT B12 SERPL-MCNC: 1063 PG/ML — SIGNIFICANT CHANGE UP (ref 232–1245)
WBC # BLD: 7.42 K/UL — SIGNIFICANT CHANGE UP (ref 3.8–10.5)
WBC # FLD AUTO: 7.42 K/UL — SIGNIFICANT CHANGE UP (ref 3.8–10.5)

## 2019-06-13 PROCEDURE — 99232 SBSQ HOSP IP/OBS MODERATE 35: CPT

## 2019-06-13 RX ADMIN — METHADONE HYDROCHLORIDE 5 MILLIGRAM(S): 40 TABLET ORAL at 21:46

## 2019-06-13 RX ADMIN — Medication 1 PATCH: at 12:15

## 2019-06-13 RX ADMIN — Medication 250 MILLIGRAM(S): at 23:59

## 2019-06-13 RX ADMIN — Medication 250 MILLIGRAM(S): at 16:21

## 2019-06-13 RX ADMIN — Medication 1 PATCH: at 12:17

## 2019-06-13 RX ADMIN — Medication 250 MILLIGRAM(S): at 07:45

## 2019-06-13 RX ADMIN — METHADONE HYDROCHLORIDE 5 MILLIGRAM(S): 40 TABLET ORAL at 07:45

## 2019-06-13 RX ADMIN — LISINOPRIL 20 MILLIGRAM(S): 2.5 TABLET ORAL at 05:50

## 2019-06-13 RX ADMIN — MICAFUNGIN SODIUM 100 MILLIGRAM(S): 100 INJECTION, POWDER, LYOPHILIZED, FOR SOLUTION INTRAVENOUS at 18:33

## 2019-06-13 RX ADMIN — Medication 1 PATCH: at 07:46

## 2019-06-13 RX ADMIN — ZOLPIDEM TARTRATE 10 MILLIGRAM(S): 10 TABLET ORAL at 21:46

## 2019-06-13 RX ADMIN — AMLODIPINE BESYLATE 10 MILLIGRAM(S): 2.5 TABLET ORAL at 05:50

## 2019-06-13 RX ADMIN — METHADONE HYDROCHLORIDE 5 MILLIGRAM(S): 40 TABLET ORAL at 12:20

## 2019-06-13 NOTE — PROGRESS NOTE ADULT - SUBJECTIVE AND OBJECTIVE BOX
h/h stable    Vital Signs Last 24 Hrs  T(C): 36.8 (13 Jun 2019 11:14), Max: 36.8 (12 Jun 2019 23:31)  T(F): 98.2 (13 Jun 2019 11:14), Max: 98.3 (12 Jun 2019 23:31)  HR: 85 (13 Jun 2019 11:14) (63 - 85)  BP: 151/89 (13 Jun 2019 11:14) (143/83 - 155/97)  BP(mean): --  RR: 18 (13 Jun 2019 11:14) (18 - 18)  SpO2: 98% (13 Jun 2019 11:14) (97% - 98%)    PHYSICAL EXAM:    general - AAO x 3  HEENT - No Icterus  CVS - RRR  RS - AE B/L  Abd - soft, NT  Ext - Pulses +        LABS:                        9.2    7.42  )-----------( 230      ( 13 Jun 2019 07:23 )             27.3     06-13    143  |  106  |  11  ----------------------------<  81  3.9   |  31  |  1.10    Ca    8.6      13 Jun 2019 07:23  Phos  3.2     06-13  Mg     1.9     06-13    TPro  6.0  /  Alb  2.6<L>  /  TBili  0.5  /  DBili  x   /  AST  20  /  ALT  35  /  AlkPhos  180<H>  06-13          Culture - Blood (collected 09 Jun 2019 12:21)  Source: .Blood  Preliminary Report (10 David 2019 13:00):    No growth to date.    Culture - Blood (collected 09 Jun 2019 01:45)  Source: .Blood  Preliminary Report (10 David 2019 02:00):    No growth to date.    Culture - Blood (collected 07 Jun 2019 23:09)  Source: .Blood  Final Report (13 Jun 2019 01:01):    No growth at 5 days.    Culture - Surgical Swab (collected 07 Jun 2019 23:02)  Source: .Surgical Swab LEFT FOREARM WOUND  Final Report (12 Jun 2019 17:32):    Rare Streptococcus intermedius "Susceptibilities not performed"    Culture - Other (collected 07 Jun 2019 01:21)  Source: Skin  Final Report (08 Jun 2019 21:27):    No growth at 48 hours    Culture - Urine (collected 07 Jun 2019 01:18)  Source: .Urine  Final Report (07 Jun 2019 18:54):    No growth    Culture - Blood (collected 06 Jun 2019 13:03)  Source: .Blood  Gram Stain (10 David 2019 13:42):    Growth in aerobic bottle: Yeast like cells  Final Report (10 David 2019 13:42):    Growth in aerobic bottle: Candida orthopsilosis    Fungal ELIO Interpretive Guidelines:    SDD*= Sensitive dose dependent    AMPHOTERICIN B= N/A; No interpretive guidelines available    FLUCONAZOLE= Data established for mucosal disease, and    is generally applicable for invasive disease.    Susceptibility is dependent on achieving    the maximal possible blood level.    Doses of 400 MG/day or more may be required in adults with    normal renal function and body habitus.    ITRACONAZOLE= Data apply only to mucosal disease.    Susceptibility is dependent on achieving    the maximal possible blood level.    Measure to assure drug absorption and    Itraconazole plasma concentrations of > 0.5 MCG/ML may    be required for optimal response.    FLUCYTOSINE= Breakpoints are basedon available clinical and    pharmacokinetic data.    VORICONAZOLE: The ELIO has been determined by the    colormetric microdilution method.  This test is not approved    by the FDA and is for research use only.  The performance    characteristics of this assayhave been established by [facility] and [facility], [place], [place].    Voriconazole has been approved by the FDA for primary    therapy for invasive Aspergillosis and as salvage    therapy for treatment of Scedosporium apiospermum and    Fusariosis.    CASPOFUNGIN= Is indicated in the treatment of candidemia,    intra-abdominal abscess, peritonitis, pleural space    infections and esophageal candidiasis.  Standardized    susceptability testing methods for Echinocandins have not    been establishedfor yeast and filamentous fungi, and    results of susceptibility studies do not necessarily    correlate with clinical outcomes.  Organism: Yeast (10 David 2019 13:42)  Organism: Yeast (10 David 2019 13:42)    Culture - Blood (collected 06 Jun 2019 13:03)  Source: .Blood  Gram Stain (10 David 2019 23:22):    Growth in aerobic bottle: Yeast like cells  Final Report (10 David 2019 23:22):    Growth in aerobic bottle: Candida orthopsilosis    See previous culture [id number]    Culture - Blood (collected 05 Jun 2019 13:19)  Source: .Blood  Gram Stain (09 Jun 2019 13:33):    Growth in aerobic bottle: Gram Positive Cocci in Clusters  Final Report (09 Jun 2019 13:33):    Growth in aerobic bottle: Coag Negative Staphylococcus    Single set isolate, possible contaminant. Contact    Microbiology if susceptibility testing clinically    indicated.    "Due to technical problems, Proteus sp. will Not be reported as part of    the BCID panel until further notice"    ***Blood Panel PCR results on this specimen are available    approximately 3 hours after the Gram stain result.***    Gram stain, PCR, and/or culture results may not always    correspond due to difference in methodologies.    ************************************************************    This PCR assay was performed using [employer].    The following targets are tested for: Enterococcus,    vancomycin resistant enterococci, Listeria monocytogenes,    coagulase negative staphylococci, S. aureus,    methicillin resistant S. aureus, Streptococcus agalactiae    (Group B), S. pneumoniae, S. pyogenes (Group A),    Acinetobacter baumannii, Enterobacter cloacae, E. coli,    Klebsiella oxytoca, K. pneumoniae, Proteus sp.,    Serratia marcescens, Haemophilus influenzae,    Neisseria meningitidis, Pseudomonas aeruginosa, Candida    albicans, C. glabrata, C krusei, C parapsilosis,    C. tropicalis and the KPC resistance gene.  Organism: Blood Culture PCR (12 Jun 2019 19:28)  Organism: Blood Culture PCR (09 Jun 2019 13:33)    Culture - Blood (collected 05 Jun 2019 13:19)  Source: .Blood  Gram Stain (10 David 2019 13:46):    Growth in aerobic bottle: Yeast like cells  Final Report (10 David 2019 13:46):    Growth in aerobic bottle: Candida orthopsilosis    See previous culture [id number]    "Due to technical problems, Proteus sp. will Not be reported as part of    the BCID panel until further notice"    ***Blood Panel PCR results on this specimen are available    approximately 3 hours after the Gram stain result.***    Gram stain, PCR, and/or culture results may not always    correspond due to difference in methodologies.    ************************************************************    This PCR assay was performed using [employer].    The following targets are tested for: Enterococcus,    vancomycin resistant enterococci, Listeria monocytogenes,    coagulase negative staphylococci, S. aureus,    methicillin resistant S. aureus, Streptococcus agalactiae    (Group B), S. pneumoniae, S. pyogenes (Group A),    Acinetobacter baumannii, Enterobacter cloacae, E. coli,    Klebsiella oxytoca, K. pneumoniae, Proteus sp.,    Serratia marcescens, Haemophilus influenzae,    Neisseria meningitidis, Pseudomonas aeruginosa, Candida    albicans, C. glabrata, C krusei, C parapsilosis,    C. tropicalis and the KPC resistance gene.  Organism: Blood Culture PCR (10 David 2019 13:46)  Organism: Blood Culture PCR (10 David 2019 13:46)        RADIOLOGY & ADDITIONAL STUDIES:

## 2019-06-13 NOTE — PROGRESS NOTE ADULT - SUBJECTIVE AND OBJECTIVE BOX
39 year old male presents today for follow up, pt presents with a draining wound on his left forearm, pt was seen yesterday but did not want to stay, he did want to try oral antibiotics, he does admit to heroin use prior to his symptoms, he did have fever on Saturday and  then swelling which worsened, he denies tenderness +drainage of pus today - denies local pain - OF note -## he does not want anybody especially his fiance to know about his drug use## (2019 12:25)      Chief Complaint:  Patient is a 39y old  Male who presents with a chief complaint of cellultis (10 David 2019 19:57)      Review of Systems:    General:  No wt loss, fevers, chills, night sweats  Eyes:  Good vision, no reported pain  ENT:  No sore throat, pain, runny nose, dysphagia  CV:  No pain, palpitations, hypo/hypertension  Resp:  No dyspnea, cough, tachypnea, wheezing  GI:  No pain, nausea, vomiting, diarrhea, constipation  :  No pain, bleeding, incontinence, nocturia           Social History/Family History  SOCHX:   tobacco,  -  alcohol    FMHX: FA/MO  - contributory       Discussed with:  PMD, Family    Physical Exam:    Vital Signs:  Vital Signs Last 24 Hrs  T(C): 36.2 (2019 05:44), Max: 36.5 (10 David 2019 11:24)  T(F): 97.2 (2019 05:44), Max: 97.7 (10 David 2019 11:24)  HR: 66 (2019 05:44) (66 - 100)  BP: 155/98 (2019 05:44) (152/83 - 181/97)  BP(mean): --  RR: 18 (2019 05:44) (17 - 18)  SpO2: 98% (2019 05:44) (98% - 100%)  Daily     Daily Weight in k.4 (2019 05:44)  I&O's Summary    2019 07:01  -  10 David 2019 07:00  --------------------------------------------------------  IN: 2030 mL / OUT: 0 mL / NET: 2030 mL          Chest:  Full & symmetric excursion, no increased effort, breath sounds clear  Cardiovascular:  Regular rhythm, S1, S2, no murmur/rub/S3/S4,  Abdomen:  Soft, non-tender, non-distended, normoactive bowel sounds, no HSM        Assessment:  Bacteremia  Asked to assess for ALEJANDRO  TTE noted  I will schedule a ALEJANDRO this week,  friday

## 2019-06-13 NOTE — PROGRESS NOTE ADULT - PROBLEM SELECTOR PLAN 1
s/p I and D by plastics.  Blood cultures positive for Candida and MRSA, continue with Vanc ( day 8),  and     micfungin ( day 4)   was on Diflucan but blood cx resulted candida again.    for ALEJANDRO for Friday per  Gustavo Smith also will need to await blood cultures from 6/7, 6/9 to be negative before any discussion about d/c .   HIV non reactive.   Wound cultures negative .

## 2019-06-13 NOTE — PROGRESS NOTE ADULT - SUBJECTIVE AND OBJECTIVE BOX
INTERVAL HPI/OVERNIGHT EVENTS:     Patient seen and examined. s/p I and D of Lt arm abscess, tolerated procedure well.    No events over night.    MEDICATIONS  (STANDING):  amLODIPine   Tablet 10 milliGRAM(s) Oral daily  lisinopril 20 milliGRAM(s) Oral daily  micafungin IVPB      micafungin IVPB 150 milliGRAM(s) IV Intermittent every 24 hours  nicotine - 21 mG/24Hr(s) Patch 1 patch Transdermal daily  vancomycin  IVPB 750 milliGRAM(s) IV Intermittent every 8 hours  zolpidem 10 milliGRAM(s) Oral at bedtime    MEDICATIONS  (PRN):  acetaminophen   Tablet .. 650 milliGRAM(s) Oral every 6 hours PRN Mild Pain (1 - 3)  methadone    Tablet 5 milliGRAM(s) Oral every 4 hours PRN opiate cravings or severe pain      Vital Signs Last 24 Hrs  T(C): 36.8 (13 Jun 2019 11:14), Max: 36.8 (12 Jun 2019 23:31)  T(F): 98.2 (13 Jun 2019 11:14), Max: 98.3 (12 Jun 2019 23:31)  HR: 85 (13 Jun 2019 11:14) (63 - 85)  BP: 151/89 (13 Jun 2019 11:14) (143/83 - 155/97)  BP(mean): --  RR: 18 (13 Jun 2019 11:14) (18 - 18)  SpO2: 98% (13 Jun 2019 11:14) (97% - 98%)    PHYSICAL EXAM:    GENERAL: NAD, well-groomed, well-developed, awake, alert, oriented x 3, fluent and coherent speech  HEAD:  Atraumatic, Normocephalic  EYES: EOMI, conjunctiva and sclera clear  NECK: Supple  NERVOUS SYSTEM:  Good concentration; Moving all 4 extremities; No gross sensory deficits, No facial droop  CHEST WALL: No masses  CHEST/LUNG: Clear to auscultation bilaterally; No rales, rhonchi, wheezing, or rubs  HEART: Regular rate and rhythm; No murmurs, rubs, or gallops  ABDOMEN: Soft, Nontender, Nondistended, Bowel sounds present, No palpable masses or organomegaly, No bruits  EXTREMITIES:  left arm forearm +  induration , mild redness, no pain s/p I&D   edema to lower extremities  SKIN: No rashes or lesions                      LABS:                                 9.2    7.42  )-----------( 230      ( 13 Jun 2019 07:23 )             27.3     06-13    143  |  106  |  11  ----------------------------<  81  3.9   |  31  |  1.10    Ca    8.6      13 Jun 2019 07:23  Phos  3.2     06-13  Mg     1.9     06-13    TPro  6.0  /  Alb  2.6<L>  /  TBili  0.5  /  DBili  x   /  AST  20  /  ALT  35  /  AlkPhos  180<H>  06-13                                  RADIOLOGY & ADDITIONAL TESTS:

## 2019-06-14 LAB
CULTURE RESULTS: SIGNIFICANT CHANGE UP
CULTURE RESULTS: SIGNIFICANT CHANGE UP
SPECIMEN SOURCE: SIGNIFICANT CHANGE UP
SPECIMEN SOURCE: SIGNIFICANT CHANGE UP
VANCOMYCIN TROUGH SERPL-MCNC: 17.6 UG/ML — SIGNIFICANT CHANGE UP (ref 10–20)

## 2019-06-14 PROCEDURE — 93306 TTE W/DOPPLER COMPLETE: CPT | Mod: 26

## 2019-06-14 PROCEDURE — 99232 SBSQ HOSP IP/OBS MODERATE 35: CPT

## 2019-06-14 RX ORDER — LISINOPRIL 2.5 MG/1
40 TABLET ORAL DAILY
Refills: 0 | Status: DISCONTINUED | OUTPATIENT
Start: 2019-06-15 | End: 2019-06-21

## 2019-06-14 RX ORDER — PANTOPRAZOLE SODIUM 20 MG/1
40 TABLET, DELAYED RELEASE ORAL
Refills: 0 | Status: DISCONTINUED | OUTPATIENT
Start: 2019-06-14 | End: 2019-07-01

## 2019-06-14 RX ORDER — SODIUM CHLORIDE 9 MG/ML
1000 INJECTION, SOLUTION INTRAVENOUS
Refills: 0 | Status: DISCONTINUED | OUTPATIENT
Start: 2019-06-14 | End: 2019-06-14

## 2019-06-14 RX ORDER — ONDANSETRON 8 MG/1
4 TABLET, FILM COATED ORAL ONCE
Refills: 0 | Status: DISCONTINUED | OUTPATIENT
Start: 2019-06-14 | End: 2019-06-14

## 2019-06-14 RX ADMIN — MICAFUNGIN SODIUM 100 MILLIGRAM(S): 100 INJECTION, POWDER, LYOPHILIZED, FOR SOLUTION INTRAVENOUS at 18:21

## 2019-06-14 RX ADMIN — Medication 1 PATCH: at 08:05

## 2019-06-14 RX ADMIN — SODIUM CHLORIDE 75 MILLILITER(S): 9 INJECTION, SOLUTION INTRAVENOUS at 12:42

## 2019-06-14 RX ADMIN — ZOLPIDEM TARTRATE 10 MILLIGRAM(S): 10 TABLET ORAL at 21:07

## 2019-06-14 RX ADMIN — Medication 1 PATCH: at 12:54

## 2019-06-14 RX ADMIN — Medication 250 MILLIGRAM(S): at 23:49

## 2019-06-14 RX ADMIN — Medication 1 PATCH: at 12:52

## 2019-06-14 RX ADMIN — METHADONE HYDROCHLORIDE 5 MILLIGRAM(S): 40 TABLET ORAL at 13:00

## 2019-06-14 RX ADMIN — METHADONE HYDROCHLORIDE 5 MILLIGRAM(S): 40 TABLET ORAL at 21:06

## 2019-06-14 RX ADMIN — Medication 250 MILLIGRAM(S): at 16:06

## 2019-06-14 RX ADMIN — Medication 1 PATCH: at 20:04

## 2019-06-14 RX ADMIN — Medication 250 MILLIGRAM(S): at 08:05

## 2019-06-14 NOTE — PROGRESS NOTE ADULT - PROBLEM SELECTOR PLAN 1
s/p I and D by plastics.  Blood cultures positive for Candida and MRSA, continue with Vanc ( day 9),  and     Micafungin ( day 5)   was on Diflucan but blood cx resulted candida again.   s/p ALEJANDRO today await results .   blood cultures from 6/7, 6/9 are negtaive will d/w Mary about d/c plan s.     HIV non reactive. s/p I and D by plastics.  Blood cultures positive for Candida and MRSA, continue with Vanc ( day 9),  and     Micafungin ( day 5)   was on Diflucan but blood cx resulted candida again.   s/p ALEJANDRO today and no evidence of vegetation     blood cultures from 6/7, 6/9 are negative will d/w Mary about d/c plan s.     HIV non reactive.   6/14/19 d/w Dr. Hernandez will need 3 weeks of antibx from 6/6/19 forward. pt will need picc line for IV vanco and to go to rehab as has h/o IVDA

## 2019-06-14 NOTE — PROGRESS NOTE ADULT - SUBJECTIVE AND OBJECTIVE BOX
lying in bed	    Vital Signs Last 24 Hrs  T(C): 36.6 (14 Jun 2019 10:48), Max: 37.2 (13 Jun 2019 18:27)  T(F): 97.8 (14 Jun 2019 10:48), Max: 99 (13 Jun 2019 18:27)  HR: 68 (14 Jun 2019 10:48) (68 - 83)  BP: 145/85 (14 Jun 2019 10:48) (143/86 - 148/82)  BP(mean): --  RR: 16 (14 Jun 2019 10:48) (16 - 19)  SpO2: 96% (14 Jun 2019 10:48) (96% - 100%)    PHYSICAL EXAM:    general - AAO x 3  HEENT - No Icterus  CVS - RRR  RS - AE B/L  Abd - soft, NT  Ext - Pulses +        LABS:                        9.2    7.42  )-----------( 230      ( 13 Jun 2019 07:23 )             27.3     06-13    143  |  106  |  11  ----------------------------<  81  3.9   |  31  |  1.10    Ca    8.6      13 Jun 2019 07:23  Phos  3.2     06-13  Mg     1.9     06-13    TPro  6.0  /  Alb  2.6<L>  /  TBili  0.5  /  DBili  x   /  AST  20  /  ALT  35  /  AlkPhos  180<H>  06-13          Culture - Blood (collected 09 Jun 2019 12:21)  Source: .Blood  Preliminary Report (10 David 2019 13:00):    No growth to date.    Culture - Blood (collected 09 Jun 2019 01:45)  Source: .Blood  Final Report (14 Jun 2019 02:01):    No growth at 5 days.    Culture - Blood (collected 07 Jun 2019 23:09)  Source: .Blood  Final Report (13 Jun 2019 01:01):    No growth at 5 days.    Culture - Surgical Swab (collected 07 Jun 2019 23:02)  Source: .Surgical Swab LEFT FOREARM WOUND  Final Report (12 Jun 2019 17:32):    Rare Streptococcus intermedius "Susceptibilities not performed"    Culture - Other (collected 07 Jun 2019 01:21)  Source: Skin  Final Report (08 Jun 2019 21:27):    No growth at 48 hours    Culture - Urine (collected 07 Jun 2019 01:18)  Source: .Urine  Final Report (07 Jun 2019 18:54):    No growth    Culture - Blood (collected 06 Jun 2019 13:03)  Source: .Blood  Gram Stain (10 David 2019 13:42):    Growth in aerobic bottle: Yeast like cells  Final Report (10 David 2019 13:42):    Growth in aerobic bottle: Candida orthopsilosis    Fungal ELIO Interpretive Guidelines:    SDD*= Sensitive dose dependent    AMPHOTERICIN B= N/A; No interpretive guidelines available    FLUCONAZOLE= Data established for mucosal disease, and    is generally applicable for invasive disease.    Susceptibility is dependent on achieving    the maximal possible blood level.    Doses of 400 MG/day or more may be required in adults with    normal renal function and body habitus.    ITRACONAZOLE= Data apply only to mucosal disease.    Susceptibility is dependent on achieving    the maximal possible blood level.    Measure to assure drug absorption and    Itraconazole plasma concentrations of > 0.5 MCG/ML may    be required for optimal response.    FLUCYTOSINE= Breakpoints are basedon available clinical and    pharmacokinetic data.    VORICONAZOLE: The ELIO has been determined by the    colormetric microdilution method.  This test is not approved    by the FDA and is for research use only.  The performance    characteristics of this assayhave been established by [employer] and [facility], [place], [place].    Voriconazole has been approved by the FDA for primary    therapy for invasive Aspergillosis and as salvage    therapy for treatment of Scedosporium apiospermum and    Fusariosis.    CASPOFUNGIN= Is indicated in the treatment of candidemia,    intra-abdominal abscess, peritonitis, pleural space    infections and esophageal candidiasis.  Standardized    susceptability testing methods for Echinocandins have not    been establishedfor yeast and filamentous fungi, and    results of susceptibility studies do not necessarily    correlate with clinical outcomes.  Organism: Yeast (10 David 2019 13:42)  Organism: Yeast (10 David 2019 13:42)    Culture - Blood (collected 06 Jun 2019 13:03)  Source: .Blood  Gram Stain (10 David 2019 23:22):    Growth in aerobic bottle: Yeast like cells  Final Report (10 David 2019 23:22):    Growth in aerobic bottle: Candida orthopsilosis    See previous culture [id number]    Culture - Blood (collected 05 Jun 2019 13:19)  Source: .Blood  Gram Stain (09 Jun 2019 13:33):    Growth in aerobic bottle: Gram Positive Cocci in Clusters  Final Report (09 Jun 2019 13:33):    Growth in aerobic bottle: Coag Negative Staphylococcus    Single set isolate, possible contaminant. Contact    Microbiology if susceptibility testing clinically    indicated.    "Due to technical problems, Proteus sp. will Not be reported as part of    the BCID panel until further notice"    ***Blood Panel PCR results on this specimen are available    approximately 3 hours after the Gram stain result.***    Gram stain, PCR, and/or culture results may not always    correspond due to difference in methodologies.    ************************************************************    This PCR assay was performed using [employer].    The following targets are tested for: Enterococcus,    vancomycin resistant enterococci, Listeria monocytogenes,    coagulase negative staphylococci, S. aureus,    methicillin resistant S. aureus, Streptococcus agalactiae    (Group B), S. pneumoniae, S. pyogenes (Group A),    Acinetobacter baumannii, Enterobacter cloacae, E. coli,    Klebsiella oxytoca, K. pneumoniae, Proteus sp.,    Serratia marcescens, Haemophilus influenzae,    Neisseria meningitidis, Pseudomonas aeruginosa, Candida    albicans, C. glabrata, C krusei, C parapsilosis,    C. tropicalis and the KPC resistance gene.  Organism: Blood Culture PCR (12 Jun 2019 19:28)  Organism: Blood Culture PCR (09 Jun 2019 13:33)    Culture - Blood (collected 05 Jun 2019 13:19)  Source: .Blood  Gram Stain (10 David 2019 13:46):    Growth in aerobic bottle: Yeast like cells  Final Report (10 David 2019 13:46):    Growth in aerobic bottle: Candida orthopsilosis    See previous culture [id number]    "Due to technical problems, Proteus sp. will Not be reported as part of    the BCID panel until further notice"    ***Blood Panel PCR results on this specimen are available    approximately 3 hours after the Gram stain result.***    Gram stain, PCR, and/or culture results may not always    correspond due to difference in methodologies.    ************************************************************    This PCR assay was performed using [employer].    The following targets are tested for: Enterococcus,    vancomycin resistant enterococci, Listeria monocytogenes,    coagulase negative staphylococci, S. aureus,    methicillin resistant S. aureus, Streptococcus agalactiae    (Group B), S. pneumoniae, S. pyogenes (Group A),    Acinetobacter baumannii, Enterobacter cloacae, E. coli,    Klebsiella oxytoca, K. pneumoniae, Proteus sp.,    Serratia marcescens, Haemophilus influenzae,    Neisseria meningitidis, Pseudomonas aeruginosa, Candida    albicans, C. glabrata, C krusei, C parapsilosis,    C. tropicalis and the KPC resistance gene.  Organism: Blood Culture PCR (10 David 2019 13:46)  Organism: Blood Culture PCR (10 David 2019 13:46)        RADIOLOGY & ADDITIONAL STUDIES:

## 2019-06-14 NOTE — PROGRESS NOTE ADULT - ASSESSMENT
intravenous drug addict suppurative thrombo phlebitis   cns candida   micro final blood culture is cns   dc zosyn   resume mycakmine as need ALEJANDRO   if more blood culture NEGATIVE and ALEJANDRO NEGATIVE discharge plan on po diflucan   await more blood culture   no murmur   no stigmata of infectious endocarditis   await ALEJANDRO  discussed with micro again   there is NO methicillin resitant staph aureus in blood   pcr was methicillin resitant staph aureus but final cns

## 2019-06-14 NOTE — PROGRESS NOTE ADULT - SUBJECTIVE AND OBJECTIVE BOX
HPI:  39 year old male presents today for follow up, pt presents with a draining wound on his left forearm, pt was seen yesterday but did not want to stay, he did want to try oral antibiotics, he does admit to heroin use prior to his symptoms, he did have fever on Saturday and Sunday then swelling which worsened, he denies tenderness +drainage of pus today - denies local pain -   much better   anxious to go home   ALEJANDRO today       Allergies    No Known Allergies    Intolerances        MEDICATIONS  (STANDING):  amLODIPine   Tablet 10 milliGRAM(s) Oral daily  lisinopril 20 milliGRAM(s) Oral daily  micafungin IVPB      micafungin IVPB 150 milliGRAM(s) IV Intermittent every 24 hours  nicotine - 21 mG/24Hr(s) Patch 1 patch Transdermal daily  vancomycin  IVPB 750 milliGRAM(s) IV Intermittent every 8 hours  zolpidem 10 milliGRAM(s) Oral at bedtime    MEDICATIONS  (PRN):  acetaminophen   Tablet .. 650 milliGRAM(s) Oral every 6 hours PRN Mild Pain (1 - 3)  methadone    Tablet 5 milliGRAM(s) Oral every 4 hours PRN opiate cravings or severe pain      REVIEW OF SYSTEMS:  feels fine now   CONSTITUTIONAL: No fever, chills, weight loss, or fatigue  HEENT: No sore throat, runny nose, ear ache  RESPIRATORY: No cough, wheezing, No shortness of breath  CARDIOVASCULAR: No chest pain, palpitations, dizziness  GASTROINTESTINAL: No abdominal pain. No nausea, vomiting, diarrhea  GENITOURINARY: No dysuria, increase frequency, hematuria, or incontinence  NEUROLOGICAL: No headaches, memory loss, loss of strength, numbness, or tremors, no weakness  EXTREMITY: No pedal edema BLE  SKIN: No itching, burning, rashes, or lesions   some withdrawl signs and symptoms     VITAL SIGNS:  T(C): 36.6 (06-14-19 @ 05:08), Max: 37.2 (06-13-19 @ 18:27)  T(F): 97.9 (06-14-19 @ 05:08), Max: 99 (06-13-19 @ 18:27)  HR: 74 (06-14-19 @ 05:08) (69 - 85)  BP: 148/82 (06-14-19 @ 05:08) (143/86 - 151/89)  RR: 17 (06-14-19 @ 05:08) (17 - 19)  SpO2: 98% (06-14-19 @ 05:08) (98% - 100%)  Wt(kg): --    PHYSICAL EXAM:    GENERAL: not in any distress  HEENT: Neck is supple, normocephalic, atraumatic   CHEST/LUNG: Clear to auscultation bilaterally; No rales, rhonchi, wheezing  HEART: Regular rate and rhythm; No murmurs, rubs, or gallops  ABDOMEN: Soft, Nontender, Nondistended; Bowel sounds present, no rebound   EXTREMITIES:  2+ Peripheral Pulses, No clubbing, cyanosis, or edema  arm nice and clean wound   SKIN: No rashes or lesions  BACK: no pressor sore   NERVOUS SYSTEM:  Alert & Oriented X3, Good concentration  PSYCH: normal affect     LABS:                         9.2    7.42  )-----------( 230      ( 13 Jun 2019 07:23 )             27.3     06-13    143  |  106  |  11  ----------------------------<  81  3.9   |  31  |  1.10    Ca    8.6      13 Jun 2019 07:23  Phos  3.2     06-13  Mg     1.9     06-13    TPro  6.0  /  Alb  2.6<L>  /  TBili  0.5  /  DBili  x   /  AST  20  /  ALT  35  /  AlkPhos  180<H>  06-13    LIVER FUNCTIONS - ( 13 Jun 2019 07:23 )  Alb: 2.6 g/dL / Pro: 6.0 gm/dL / ALK PHOS: 180 U/L / ALT: 35 U/L / AST: 20 U/L / GGT: x                     Thyroid Stimulating Hormone, Serum: 0.029 uU/mL (06-12 @ 07:16)      Sedimentation Rate, Erythrocyte: 38 mm/hr (06-13 @ 07:23)      Vancomycin Level, Trough: 17.0 ug/mL (06-12 @ 07:16)        Culture Results:   No growth to date. (06-09 @ 12:21)  Culture Results:   No growth at 5 days. (06-09 @ 01:45)  Culture Results:   No growth at 5 days. (06-07 @ 23:09)  Culture Results:   Rare Streptococcus intermedius "Susceptibilities not performed" (06-07 @ 23:02)                Radiology:

## 2019-06-14 NOTE — PROGRESS NOTE ADULT - SUBJECTIVE AND OBJECTIVE BOX
INTERVAL HPI/OVERNIGHT EVENTS:     Patient seen and examined. s/p I and D of Lt arm abscess, tolerated procedure well.    No events over night.    MEDICATIONS  (STANDING):  amLODIPine   Tablet 10 milliGRAM(s) Oral daily  micafungin IVPB      micafungin IVPB 150 milliGRAM(s) IV Intermittent every 24 hours  nicotine - 21 mG/24Hr(s) Patch 1 patch Transdermal daily  vancomycin  IVPB 750 milliGRAM(s) IV Intermittent every 8 hours  zolpidem 10 milliGRAM(s) Oral at bedtime    MEDICATIONS  (PRN):  acetaminophen   Tablet .. 650 milliGRAM(s) Oral every 6 hours PRN Mild Pain (1 - 3)  methadone    Tablet 5 milliGRAM(s) Oral every 4 hours PRN opiate cravings or severe pain      Vital Signs Last 24 Hrs  T(C): 36.4 (14 Jun 2019 12:45), Max: 37.2 (13 Jun 2019 18:27)  T(F): 97.6 (14 Jun 2019 12:45), Max: 99 (13 Jun 2019 18:27)  HR: 76 (14 Jun 2019 12:45) (60 - 83)  BP: 146/97 (14 Jun 2019 12:45) (143/86 - 157/79)  BP(mean): --  RR: 16 (14 Jun 2019 12:45) (12 - 19)  SpO2: 98% (14 Jun 2019 12:45) (95% - 100%)    PHYSICAL EXAM:    GENERAL: NAD, well-groomed, well-developed, awake, alert, oriented x 3, fluent and coherent speech  HEAD:  Atraumatic, Normocephalic  EYES: EOMI, conjunctiva and sclera clear  NECK: Supple  NERVOUS SYSTEM:  Good concentration; Moving all 4 extremities; No gross sensory deficits, No facial droop  CHEST WALL: No masses  CHEST/LUNG: Clear to auscultation bilaterally; No rales, rhonchi, wheezing, or rubs  HEART: Regular rate and rhythm; No murmurs, rubs, or gallops  ABDOMEN: Soft, Nontender, Nondistended, Bowel sounds present, No palpable masses or organomegaly, No bruits  EXTREMITIES:  left arm forearm +  induration , mild redness, no pain s/p I&D   edema to lower extremities  SKIN: No rashes or lesions                      LABS:                           9.2    7.42  )-----------( 230      ( 13 Jun 2019 07:23 )             27.3   06-13    143  |  106  |  11  ----------------------------<  81  3.9   |  31  |  1.10    Ca    8.6      13 Jun 2019 07:23  Phos  3.2     06-13  Mg     1.9     06-13    TPro  6.0  /  Alb  2.6<L>  /  TBili  0.5  /  DBili  x   /  AST  20  /  ALT  35  /  AlkPhos  180<H>  06-13                                RADIOLOGY & ADDITIONAL TESTS:

## 2019-06-14 NOTE — PROGRESS NOTE ADULT - SUBJECTIVE AND OBJECTIVE BOX
39 year old male presents today for follow up, pt presents with a draining wound on his left forearm, pt was seen yesterday but did not want to stay, he did want to try oral antibiotics, he does admit to heroin use prior to his symptoms, he did have fever on Saturday and  then swelling which worsened, he denies tenderness +drainage of pus today - denies local pain - OF note -## he does not want anybody especially his fiance to know about his drug use## (2019 12:25)      Chief Complaint:  Patient is a 39y old  Male who presents with a chief complaint of cellultis (10 David 2019 19:57)      Review of Systems:    General:  No wt loss, fevers, chills, night sweats  Eyes:  Good vision, no reported pain  ENT:  No sore throat, pain, runny nose, dysphagia  CV:  No pain, palpitations, hypo/hypertension  Resp:  No dyspnea, cough, tachypnea, wheezing  GI:  No pain, nausea, vomiting, diarrhea, constipation  :  No pain, bleeding, incontinence, nocturia           Social History/Family History  SOCHX:   tobacco,  -  alcohol    FMHX: FA/MO  - contributory       Discussed with:  PMD, Family    Physical Exam:    Vital Signs:  Vital Signs Last 24 Hrs  T(C): 36.2 (2019 05:44), Max: 36.5 (10 David 2019 11:24)  T(F): 97.2 (2019 05:44), Max: 97.7 (10 David 2019 11:24)  HR: 66 (2019 05:44) (66 - 100)  BP: 155/98 (2019 05:44) (152/83 - 181/97)  BP(mean): --  RR: 18 (2019 05:44) (17 - 18)  SpO2: 98% (2019 05:44) (98% - 100%)  Daily     Daily Weight in k.4 (2019 05:44)  I&O's Summary    2019 07:01  -  10 David 2019 07:00  --------------------------------------------------------  IN: 2030 mL / OUT: 0 mL / NET: 2030 mL          Chest:  Full & symmetric excursion, no increased effort, breath sounds clear  Cardiovascular:  Regular rhythm, S1, S2, no murmur/rub/S3/S4,  Abdomen:  Soft, non-tender, non-distended, normoactive bowel sounds, no HSM      Laboratory:                    Assessment:  Bacteremia  Asked to assess for ALEJANDRO  TTE noted  ALEJANDRO no vegetation

## 2019-06-14 NOTE — CONSULT NOTE ADULT - SUBJECTIVE AND OBJECTIVE BOX
full consult dictated    Plan: Pt being treated for fungemia secondary to IVDA. Pt noted to be anemic with 1/2 heme + stools.  Fe Sat 27%.  Pt admits to regular excedrin usaage and drinks ETOH regularly.  Pt to be started on PO protonix.  Will follow CBC - repeat next week.  If he remains anemic further endoscopy will be recommended as an outpatient.

## 2019-06-15 LAB
ANION GAP SERPL CALC-SCNC: 5 MMOL/L — SIGNIFICANT CHANGE UP (ref 5–17)
BASOPHILS # BLD AUTO: 0.03 K/UL — SIGNIFICANT CHANGE UP (ref 0–0.2)
BASOPHILS NFR BLD AUTO: 0.5 % — SIGNIFICANT CHANGE UP (ref 0–2)
BUN SERPL-MCNC: 21 MG/DL — SIGNIFICANT CHANGE UP (ref 7–23)
CALCIUM SERPL-MCNC: 9 MG/DL — SIGNIFICANT CHANGE UP (ref 8.5–10.1)
CHLORIDE SERPL-SCNC: 104 MMOL/L — SIGNIFICANT CHANGE UP (ref 96–108)
CO2 SERPL-SCNC: 31 MMOL/L — SIGNIFICANT CHANGE UP (ref 22–31)
CREAT SERPL-MCNC: 1.06 MG/DL — SIGNIFICANT CHANGE UP (ref 0.5–1.3)
EOSINOPHIL # BLD AUTO: 0.15 K/UL — SIGNIFICANT CHANGE UP (ref 0–0.5)
EOSINOPHIL NFR BLD AUTO: 2.4 % — SIGNIFICANT CHANGE UP (ref 0–6)
GLUCOSE SERPL-MCNC: 80 MG/DL — SIGNIFICANT CHANGE UP (ref 70–99)
HCT VFR BLD CALC: 30.7 % — LOW (ref 39–50)
HGB BLD-MCNC: 10.3 G/DL — LOW (ref 13–17)
IMM GRANULOCYTES NFR BLD AUTO: 1 % — SIGNIFICANT CHANGE UP (ref 0–1.5)
LYMPHOCYTES # BLD AUTO: 1.39 K/UL — SIGNIFICANT CHANGE UP (ref 1–3.3)
LYMPHOCYTES # BLD AUTO: 22.3 % — SIGNIFICANT CHANGE UP (ref 13–44)
MAGNESIUM SERPL-MCNC: 2.4 MG/DL — SIGNIFICANT CHANGE UP (ref 1.6–2.6)
MCHC RBC-ENTMCNC: 25.8 PG — LOW (ref 27–34)
MCHC RBC-ENTMCNC: 33.6 GM/DL — SIGNIFICANT CHANGE UP (ref 32–36)
MCV RBC AUTO: 76.9 FL — LOW (ref 80–100)
MONOCYTES # BLD AUTO: 0.61 K/UL — SIGNIFICANT CHANGE UP (ref 0–0.9)
MONOCYTES NFR BLD AUTO: 9.8 % — SIGNIFICANT CHANGE UP (ref 2–14)
NEUTROPHILS # BLD AUTO: 3.99 K/UL — SIGNIFICANT CHANGE UP (ref 1.8–7.4)
NEUTROPHILS NFR BLD AUTO: 64 % — SIGNIFICANT CHANGE UP (ref 43–77)
NRBC # BLD: 0 /100 WBCS — SIGNIFICANT CHANGE UP (ref 0–0)
PHOSPHATE SERPL-MCNC: 3.7 MG/DL — SIGNIFICANT CHANGE UP (ref 2.5–4.5)
PLATELET # BLD AUTO: 199 K/UL — SIGNIFICANT CHANGE UP (ref 150–400)
POTASSIUM SERPL-MCNC: 3.7 MMOL/L — SIGNIFICANT CHANGE UP (ref 3.5–5.3)
POTASSIUM SERPL-SCNC: 3.7 MMOL/L — SIGNIFICANT CHANGE UP (ref 3.5–5.3)
RBC # BLD: 3.99 M/UL — LOW (ref 4.2–5.8)
RBC # FLD: 12.7 % — SIGNIFICANT CHANGE UP (ref 10.3–14.5)
SODIUM SERPL-SCNC: 140 MMOL/L — SIGNIFICANT CHANGE UP (ref 135–145)
WBC # BLD: 6.23 K/UL — SIGNIFICANT CHANGE UP (ref 3.8–10.5)
WBC # FLD AUTO: 6.23 K/UL — SIGNIFICANT CHANGE UP (ref 3.8–10.5)

## 2019-06-15 PROCEDURE — 99232 SBSQ HOSP IP/OBS MODERATE 35: CPT

## 2019-06-15 RX ADMIN — METHADONE HYDROCHLORIDE 5 MILLIGRAM(S): 40 TABLET ORAL at 09:06

## 2019-06-15 RX ADMIN — ZOLPIDEM TARTRATE 10 MILLIGRAM(S): 10 TABLET ORAL at 21:28

## 2019-06-15 RX ADMIN — Medication 1 PATCH: at 19:00

## 2019-06-15 RX ADMIN — PANTOPRAZOLE SODIUM 40 MILLIGRAM(S): 20 TABLET, DELAYED RELEASE ORAL at 09:06

## 2019-06-15 RX ADMIN — LISINOPRIL 40 MILLIGRAM(S): 2.5 TABLET ORAL at 06:19

## 2019-06-15 RX ADMIN — AMLODIPINE BESYLATE 10 MILLIGRAM(S): 2.5 TABLET ORAL at 06:19

## 2019-06-15 RX ADMIN — Medication 250 MILLIGRAM(S): at 17:28

## 2019-06-15 RX ADMIN — MICAFUNGIN SODIUM 100 MILLIGRAM(S): 100 INJECTION, POWDER, LYOPHILIZED, FOR SOLUTION INTRAVENOUS at 19:19

## 2019-06-15 RX ADMIN — Medication 1 PATCH: at 08:03

## 2019-06-15 RX ADMIN — Medication 250 MILLIGRAM(S): at 09:06

## 2019-06-15 RX ADMIN — Medication 1 PATCH: at 12:31

## 2019-06-15 RX ADMIN — Medication 1 PATCH: at 12:26

## 2019-06-15 NOTE — PROGRESS NOTE ADULT - SUBJECTIVE AND OBJECTIVE BOX
h/h getting better    Vital Signs Last 24 Hrs  T(C): 36.6 (15 David 2019 11:56), Max: 37 (15 Davdi 2019 06:09)  T(F): 97.9 (15 David 2019 11:56), Max: 98.6 (15 David 2019 06:09)  HR: 84 (15 David 2019 11:56) (70 - 84)  BP: 130/79 (15 David 2019 11:56) (119/88 - 145/84)  BP(mean): --  RR: 17 (15 David 2019 11:56) (17 - 17)  SpO2: 97% (15 David 2019 11:56) (97% - 100%)    PHYSICAL EXAM:    general - AAO x 3  HEENT - No Icterus  CVS - RRR  RS - AE B/L  Abd - soft, NT  Ext - Pulses +        LABS:                        10.3   6.23  )-----------( 199      ( 15 David 2019 07:17 )             30.7     06-15    140  |  104  |  21  ----------------------------<  80  3.7   |  31  |  1.06    Ca    9.0      15 David 2019 07:17  Phos  3.7     06-15  Mg     2.4     06-15            Culture - Blood (collected 09 Jun 2019 12:21)  Source: .Blood  Final Report (14 Jun 2019 13:01):    No growth at 5 days.    Culture - Blood (collected 09 Jun 2019 01:45)  Source: .Blood  Final Report (14 Jun 2019 02:01):    No growth at 5 days.    Culture - Blood (collected 07 Jun 2019 23:09)  Source: .Blood  Final Report (13 Jun 2019 01:01):    No growth at 5 days.    Culture - Surgical Swab (collected 07 Jun 2019 23:02)  Source: .Surgical Swab LEFT FOREARM WOUND  Final Report (12 Jun 2019 17:32):    Rare Streptococcus intermedius "Susceptibilities not performed"    Culture - Other (collected 07 Jun 2019 01:21)  Source: Skin  Final Report (08 Jun 2019 21:27):    No growth at 48 hours    Culture - Urine (collected 07 Jun 2019 01:18)  Source: .Urine  Final Report (07 Jun 2019 18:54):    No growth    Culture - Blood (collected 06 Jun 2019 13:03)  Source: .Blood  Gram Stain (10 David 2019 13:42):    Growth in aerobic bottle: Yeast like cells  Final Report (10 David 2019 13:42):    Growth in aerobic bottle: Candida orthopsilosis    Fungal ELIO Interpretive Guidelines:    SDD*= Sensitive dose dependent    AMPHOTERICIN B= N/A; No interpretive guidelines available    FLUCONAZOLE= Data established for mucosal disease, and    is generally applicable for invasive disease.    Susceptibility is dependent on achieving    the maximal possible blood level.    Doses of 400 MG/day or more may be required in adults with    normal renal function and body habitus.    ITRACONAZOLE= Data apply only to mucosal disease.    Susceptibility is dependent on achieving    the maximal possible blood level.    Measure to assure drug absorption and    Itraconazole plasma concentrations of > 0.5 MCG/ML may    be required for optimal response.    FLUCYTOSINE= Breakpoints are basedon available clinical and    pharmacokinetic data.    VORICONAZOLE: The ELIO has been determined by the    colormetric microdilution method.  This test is not approved    by the FDA and is for research use only.  The performance    characteristics of this assayhave been established by [employer] and [place], [place].    Voriconazole has been approved by the FDA for primary    therapy for invasive Aspergillosis and as salvage    therapy for treatment of Scedosporium apiospermum and    Fusariosis.    CASPOFUNGIN= Is indicated in the treatment of candidemia,    intra-abdominal abscess, peritonitis, pleural space    infections and esophageal candidiasis.  Standardized    susceptability testing methods for Echinocandins have not    been establishedfor yeast and filamentous fungi, and    results of susceptibility studies do not necessarily    correlate with clinical outcomes.  Organism: Yeast (10 David 2019 13:42)  Organism: Yeast (10 David 2019 13:42)    Culture - Blood (collected 06 Jun 2019 13:03)  Source: .Blood  Gram Stain (10 David 2019 23:22):    Growth in aerobic bottle: Yeast like cells  Final Report (10 David 2019 23:22):    Growth in aerobic bottle: Candida orthopsilosis    See previous culture [id number]    Culture - Blood (collected 05 Jun 2019 13:19)  Source: .Blood  Gram Stain (09 Jun 2019 13:33):    Growth in aerobic bottle: Gram Positive Cocci in Clusters  Final Report (09 Jun 2019 13:33):    Growth in aerobic bottle: Coag Negative Staphylococcus    Single set isolate, possible contaminant. Contact    Microbiology if susceptibility testing clinically    indicated.    "Due to technical problems, Proteus sp. will Not be reported as part of    the BCID panel until further notice"    ***Blood Panel PCR results on this specimen are available    approximately 3 hours after the Gram stain result.***    Gram stain, PCR, and/or culture results may not always    correspond due to difference in methodologies.    ************************************************************    This PCR assay was performed using [employer].    The following targets are tested for: Enterococcus,    vancomycin resistant enterococci, Listeria monocytogenes,    coagulase negative staphylococci, S. aureus,    methicillin resistant S. aureus, Streptococcus agalactiae    (Group B), S. pneumoniae, S. pyogenes (Group A),    Acinetobacter baumannii, Enterobacter cloacae, E. coli,    Klebsiella oxytoca, K. pneumoniae, Proteus sp.,    Serratia marcescens, Haemophilus influenzae,    Neisseria meningitidis, Pseudomonas aeruginosa, Candida    albicans, C. glabrata, C krusei, C parapsilosis,    C. tropicalis and the KPC resistance gene.  Organism: Blood Culture PCR (12 Jun 2019 19:28)  Organism: Blood Culture PCR (09 Jun 2019 13:33)    Culture - Blood (collected 05 Jun 2019 13:19)  Source: .Blood  Gram Stain (10 David 2019 13:46):    Growth in aerobic bottle: Yeast like cells  Final Report (10 David 2019 13:46):    Growth in aerobic bottle: Candida orthopsilosis    See previous culture [id number]    "Due to technical problems, Proteus sp. will Not be reported as part of    the BCID panel until further notice"    ***Blood Panel PCR results on this specimen are available    approximately 3 hours after the Gram stain result.***    Gram stain, PCR, and/or culture results may not always    correspond due to difference in methodologies.    ************************************************************    This PCR assay was performed using [employer].    The following targets are tested for: Enterococcus,    vancomycin resistant enterococci, Listeria monocytogenes,    coagulase negative staphylococci, S. aureus,    methicillin resistant S. aureus, Streptococcus agalactiae    (Group B), S. pneumoniae, S. pyogenes (Group A),    Acinetobacter baumannii, Enterobacter cloacae, E. coli,    Klebsiella oxytoca, K. pneumoniae, Proteus sp.,    Serratia marcescens, Haemophilus influenzae,    Neisseria meningitidis, Pseudomonas aeruginosa, Candida    albicans, C. glabrata, C krusei, C parapsilosis,    C. tropicalis and the KPC resistance gene.  Organism: Blood Culture PCR (10 David 2019 13:46)  Organism: Blood Culture PCR (10 David 2019 13:46)        RADIOLOGY & ADDITIONAL STUDIES:

## 2019-06-15 NOTE — PROGRESS NOTE ADULT - PROBLEM SELECTOR PLAN 1
s/p I and D by plastics.  Blood cultures positive for Candida and MRSA, continue with Vanc ( day 10),  and     Micafungin ( day 6)   was on Diflucan but blood cx resulted candida again.   s/p ALEJANDRO today and no evidence of vegetation     blood cultures from 6/7, 6/9 are negative will d/w Mary about d/c plan s.     HIV non reactive.   6/14/19 d/w Dr. Hernandez will need 3 weeks of antibx from 6/6/19 forward. pt will need picc line for IV vanco and  po diflucan 400mg daily to go to rehab as has h/o IVDA   [facility] per SW would not get auth until Monday or Tuesday at best will plan for picc line Monday

## 2019-06-15 NOTE — PROGRESS NOTE ADULT - SUBJECTIVE AND OBJECTIVE BOX
INTERVAL HPI/OVERNIGHT EVENTS:     Patient seen and examined. s/p I and D of Lt arm abscess, tolerated procedure well.    No events over night.      MEDICATIONS  (STANDING):  amLODIPine   Tablet 10 milliGRAM(s) Oral daily  lisinopril 40 milliGRAM(s) Oral daily  micafungin IVPB      micafungin IVPB 150 milliGRAM(s) IV Intermittent every 24 hours  nicotine - 21 mG/24Hr(s) Patch 1 patch Transdermal daily  pantoprazole    Tablet 40 milliGRAM(s) Oral before breakfast  vancomycin  IVPB 750 milliGRAM(s) IV Intermittent every 8 hours  zolpidem 10 milliGRAM(s) Oral at bedtime    MEDICATIONS  (PRN):  acetaminophen   Tablet .. 650 milliGRAM(s) Oral every 6 hours PRN Mild Pain (1 - 3)  methadone    Tablet 5 milliGRAM(s) Oral every 4 hours PRN opiate cravings or severe pain      Vital Signs Last 24 Hrs  T(C): 37 (15 David 2019 06:09), Max: 37 (15 David 2019 06:09)  T(F): 98.6 (15 David 2019 06:09), Max: 98.6 (15 David 2019 06:09)  HR: 70 (15 David 2019 06:09) (60 - 81)  BP: 145/84 (15 David 2019 06:09) (119/88 - 157/79)  BP(mean): --  RR: 17 (15 David 2019 06:09) (12 - 19)  SpO2: 100% (15 David 2019 06:09) (95% - 100%)    PHYSICAL EXAM:    GENERAL: NAD, well-groomed, well-developed, awake, alert, oriented x 3, fluent and coherent speech  HEAD:  Atraumatic, Normocephalic  EYES: EOMI, conjunctiva and sclera clear  NECK: Supple  NERVOUS SYSTEM:  Good concentration; Moving all 4 extremities; No gross sensory deficits, No facial droop  CHEST WALL: No masses  CHEST/LUNG: Clear to auscultation bilaterally; No rales, rhonchi, wheezing, or rubs  HEART: Regular rate and rhythm; No murmurs, rubs, or gallops  ABDOMEN: Soft, Nontender, Nondistended, Bowel sounds present, No palpable masses or organomegaly, No bruits  EXTREMITIES:  left arm forearm +  induration , mild redness, no pain s/p I&D   edema to lower extremities  SKIN: No rashes or lesions                      LABS:                              10.3   6.23  )-----------( 199      ( 15 David 2019 07:17 )             30.7   06-15    140  |  104  |  21  ----------------------------<  80  3.7   |  31  |  1.06    Ca    9.0      15 David 2019 07:17  Phos  3.7     06-15  Mg     2.4     06-15                                  RADIOLOGY & ADDITIONAL TESTS:

## 2019-06-16 LAB — VANCOMYCIN TROUGH SERPL-MCNC: 17.9 UG/ML — SIGNIFICANT CHANGE UP (ref 10–20)

## 2019-06-16 PROCEDURE — 99232 SBSQ HOSP IP/OBS MODERATE 35: CPT

## 2019-06-16 RX ADMIN — METHADONE HYDROCHLORIDE 5 MILLIGRAM(S): 40 TABLET ORAL at 08:57

## 2019-06-16 RX ADMIN — AMLODIPINE BESYLATE 10 MILLIGRAM(S): 2.5 TABLET ORAL at 06:12

## 2019-06-16 RX ADMIN — Medication 1 PATCH: at 08:00

## 2019-06-16 RX ADMIN — Medication 250 MILLIGRAM(S): at 18:52

## 2019-06-16 RX ADMIN — Medication 1 PATCH: at 12:54

## 2019-06-16 RX ADMIN — Medication 250 MILLIGRAM(S): at 00:10

## 2019-06-16 RX ADMIN — Medication 1 PATCH: at 23:15

## 2019-06-16 RX ADMIN — LISINOPRIL 40 MILLIGRAM(S): 2.5 TABLET ORAL at 06:12

## 2019-06-16 RX ADMIN — PANTOPRAZOLE SODIUM 40 MILLIGRAM(S): 20 TABLET, DELAYED RELEASE ORAL at 06:11

## 2019-06-16 RX ADMIN — METHADONE HYDROCHLORIDE 5 MILLIGRAM(S): 40 TABLET ORAL at 22:11

## 2019-06-16 RX ADMIN — ZOLPIDEM TARTRATE 10 MILLIGRAM(S): 10 TABLET ORAL at 22:11

## 2019-06-16 RX ADMIN — MICAFUNGIN SODIUM 100 MILLIGRAM(S): 100 INJECTION, POWDER, LYOPHILIZED, FOR SOLUTION INTRAVENOUS at 18:52

## 2019-06-16 RX ADMIN — Medication 250 MILLIGRAM(S): at 08:59

## 2019-06-16 NOTE — DISCHARGE NOTE PROVIDER - CARE PROVIDER_API CALL
Lolis Madrigal)  Plastic Surgery  [address], [address]  [place], [place] [address]  Phone: [phone]  Fax: [phone]  Follow Up Time:     Nina Hernandez)  Infectious Disease; Internal Medicine  [address] [id number]  Phone: [phone]  Fax: [phone]  Follow Up Time:     your regular primary doctor,   Phone: (   )    -  Fax: (   )    -  Follow Up Time: Nina Hernandez)  Infectious Disease; Internal Medicine  [address] [place] [address]  Phone: [phone]  Fax: [phone]  Follow Up Time:     your regular primary doctor,   Phone: (   )    -  Fax: (   )    -  Follow Up Time:     Anusha Gutierrez)  Plastic Surgery  [address] [place] [address], [address]  [place] [address]  Phone: [phone]  Fax: [phone]  Follow Up Time:

## 2019-06-16 NOTE — DISCHARGE NOTE PROVIDER - HOSPITAL COURSE
Assessment and Plan:     · Assessment	    39 years old male with history of heroine abuse with cellultis failed after one days of outpatient oral antibiotics for admission for at least two days for intravenous antibiotics . pt has grma pos cocci in clusters in blood and admits ot going for bachelor party last week. concern is for IE              Problem/Plan - 1:    ·  Problem: Abscess of upper limb.  Plan: s/p I and D by plastics.    Blood cultures positive for Candida and MRSA, continue with Vanc   and   Micafungin    was on Diflucan but blood cx resulted candida again.     s/p ALEJANDRO  and no evidence of vegetation         blood cultures from 6/7, 6/9 are negative will d/w Mary about d/c plans.         HIV non reactive.     6/14/19 d/w Dr. Hernandez will need 3 weeks of antibx from 6/6/19 forward. pt will need picc line for IV Vanco and  PO diflucan 400mg daily to go to rehab as has h/o IVDA              Problem/Plan - 2:    ·  Problem: Fungemia.  Plan: On micafungin  ID following. change to oral Diflucan see above upon discharge.          Problem/Plan - 3:    ·  Problem: Hypertension, unspecified type.  Plan: High blood pressure, with proteinuria on UA,      continue with ACEI and Norvasc. renal indices wnl .    increased ACEI to 40 starting on 6/15/19.          Problem/Plan - 4:    ·  Problem: Substance abuse.  Plan: was seen by psych and greatly appreciate their consult ws started on methadone for withdrawal prn, Klonopin and Ambien prn.          Problem/Plan - 5:    ·  Problem: Tobacco dependence.  Plan: Nicoderm.          Problem/Plan - 6:    Problem: Edema of lower extremity. Plan: checked venous doppler negative     checked pre albumin low consulted nutrition    lasix prn.         Problem/Plan - 7:    ·  Problem: Anemia, unspecified type.  Plan: checked stool negative,  checked again positive,     consulted   see their note recc outpt endo         checked iron studies wnl      hgb stable      heme.  consult noted            TIME SPENT COMPLETING DISCHARGE AND COORDINATING CARE WITH NURSING,  AND CASE MANAGEMENT APPROX 40MINUTES 39 years old male with history of heroine abuse with cellulitis failed after one days of outpatient oral antibiotics. Patient was admitted for abscess of upper limb. s/p I+D by plastics.  Blood cultures positive concern is for IE.          Problem/Plan - 1:    ·  Problem: Abscess of upper limb.  Plan: s/p I and D by plastics.    Blood cultures positive for Candida and MRSA, continue with Vanc   and   Micafungin    was on Diflucan but blood cx resulted candida again.     s/p ALEJANDRO  and no evidence of vegetation         blood cultures from 6/7, 6/9 are negative will d/w Mary about d/c plans.         HIV non reactive.     6/14/19 d/w Dr. Hernandez will need 3 weeks of antibx from 6/6/19 forward. pt will need picc line for IV Vanco and  PO diflucan 400mg daily to go to rehab as has h/o IVDA              Problem/Plan - 2:    ·  Problem: Fungemia.  Plan: On micafungin  ID following. change to oral Diflucan see above upon discharge.          Problem/Plan - 3:    ·  Problem: Hypertension, unspecified type.  Plan: High blood pressure, with proteinuria on UA,      continue with ACEI and Norvasc. renal indices wnl .    increased ACEI to 40 starting on 6/15/19.          Problem/Plan - 4:    ·  Problem: Substance abuse.  Plan: was seen by psych and greatly appreciate their consult ws started on methadone for withdrawal prn, Klonopin and Ambien prn.          Problem/Plan - 5:    ·  Problem: Tobacco dependence.  Plan: Nicoderm.          Problem/Plan - 6:    Problem: Edema of lower extremity. Plan: checked venous doppler negative     checked pre albumin low consulted nutrition    lasix prn.         Problem/Plan - 7:    ·  Problem: Anemia, unspecified type.  Plan: checked stool negative,  checked again positive,     consulted   see their note recc outpt endo         checked iron studies wnl      hgb stable      heme.  consult noted            TIME SPENT COMPLETING DISCHARGE AND COORDINATING CARE WITH NURSING,  AND CASE MANAGEMENT APPROX 40MINUTES 39 years old male with history of heroine abuse with cellulitis failed after one days of outpatient oral antibiotics. Patient was admitted for abscess of upper limb. s/p I+D by plastics.  Blood cultures positive for candida, MRSA. ID consulted. Patient placed on IV antibiotics and antifungal medications. ALEJANDRO performed, negative for vegetation. Blood pressures elevated during hospital admission and UA showing proteinuria. Patient placed on acei and norvasc. Psychiatry consulted for heroin abuse and started on methadone, clonopin, and ambien.     Patient completed course of antibiotics and antifungals while inpatient. Patient remains afebrile with normal WBC count; hemodynamically stable for discharge.         [facility] Meth[facility]one clinic appointment set up for 7/1/19 07:30AM. Continue bp medications as directed in medication reconciliation, prescriptions sent to pharmacy.     TIME SPENT COMPLETING DISCHARGE AND COORDINATING CARE WITH NURSING,  AND CASE MANAGEMENT APPROX 40MINUTES 39 years old male with history of heroine abuse with cellulitis failed after one days of outpatient oral antibiotics. Patient was admitted for abscess of upper limb. s/p I+D by plastics.  Blood cultures positive for candida, MRSA. ID consulted. Patient placed on IV antibiotics and antifungal medications. ALEJANDRO performed, negative for vegetation. Blood pressures elevated during hospital admission and UA showing proteinuria. Patient placed on acei and norvasc. Psychiatry consulted for heroin abuse and started on methadone, clonopin, and ambien.     Patient completed course of antibiotics and antifungals while inpatient. Patient remains afebrile with normal WBC count; hemodynamically stable for discharge. Keep wound clean.         [facility] Methadone clinic appointment set up for 7/1/19 07:30AM. Continue bp medications as directed in medication reconciliation, prescriptions sent to pharmacy.     TIME SPENT COMPLETING DISCHARGE AND COORDINATING CARE WITH NURSING,  AND CASE MANAGEMENT APPROX 40MINUTES

## 2019-06-16 NOTE — PROGRESS NOTE ADULT - SUBJECTIVE AND OBJECTIVE BOX
INTERVAL HPI/OVERNIGHT EVENTS:     Patient seen and examined. s/p I and D of Lt arm abscess, tolerated procedure well.    No events over night.    MEDICATIONS  (STANDING):  amLODIPine   Tablet 10 milliGRAM(s) Oral daily  lisinopril 40 milliGRAM(s) Oral daily  micafungin IVPB      micafungin IVPB 150 milliGRAM(s) IV Intermittent every 24 hours  nicotine - 21 mG/24Hr(s) Patch 1 patch Transdermal daily  pantoprazole    Tablet 40 milliGRAM(s) Oral before breakfast  vancomycin  IVPB 750 milliGRAM(s) IV Intermittent every 8 hours  zolpidem 10 milliGRAM(s) Oral at bedtime    MEDICATIONS  (PRN):  acetaminophen   Tablet .. 650 milliGRAM(s) Oral every 6 hours PRN Mild Pain (1 - 3)  methadone    Tablet 5 milliGRAM(s) Oral every 4 hours PRN opiate cravings or severe pain    Vital Signs Last 24 Hrs  T(C): 36.2 (16 Jun 2019 05:52), Max: 36.8 (15 David 2019 17:34)  T(F): 97.2 (16 Jun 2019 05:52), Max: 98.3 (15 David 2019 17:34)  HR: 65 (16 Jun 2019 05:52) (65 - 84)  BP: 144/71 (16 Jun 2019 05:52) (130/79 - 155/87)  BP(mean): --  RR: 18 (16 Jun 2019 05:52) (17 - 18)  SpO2: 98% (16 Jun 2019 05:52) (94% - 100%)    PHYSICAL EXAM:    GENERAL: NAD, well-groomed, well-developed, awake, alert, oriented x 3, fluent and coherent speech  HEAD:  Atraumatic, Normocephalic  EYES: EOMI, conjunctiva and sclera clear  NECK: Supple  NERVOUS SYSTEM:  Good concentration; Moving all 4 extremities; No gross sensory deficits, No facial droop  CHEST WALL: No masses  CHEST/LUNG: Clear to auscultation bilaterally; No rales, rhonchi, wheezing, or rubs  HEART: Regular rate and rhythm; No murmurs, rubs, or gallops  ABDOMEN: Soft, Nontender, Nondistended, Bowel sounds present, No palpable masses or organomegaly, No bruits  EXTREMITIES:  left arm forearm +  induration , mild redness, no pain s/p I&D   edema to lower extremities  SKIN: No rashes or lesions                      LABS:                                                      RADIOLOGY & ADDITIONAL TESTS:

## 2019-06-16 NOTE — DISCHARGE NOTE PROVIDER - NSDCCPCAREPLAN_GEN_ALL_CORE_FT
PRINCIPAL DISCHARGE DIAGNOSIS  Diagnosis: Abscess of upper limb  Assessment and Plan of Treatment:       SECONDARY DISCHARGE DIAGNOSES  Diagnosis: Hypertension  Assessment and Plan of Treatment:     Diagnosis: Anemia, unspecified type  Assessment and Plan of Treatment: Anemia, unspecified type    Diagnosis: Fungemia  Assessment and Plan of Treatment: Fungemia    Diagnosis: Edema of lower extremity  Assessment and Plan of Treatment: Edema of lower extremity    Diagnosis: Heroin use disorder, moderate, in controlled environment, dependence  Assessment and Plan of Treatment: PRINCIPAL DISCHARGE DIAGNOSIS  Diagnosis: Abscess of upper limb  Assessment and Plan of Treatment: completed course of antibiotics. Follow up with plastic surgeon within 1 week of discharge date. Follow up with PCP within 2 weeks of discharge date. Follow up with Dr. Hernandez within 7-10 days of discharge date.      SECONDARY DISCHARGE DIAGNOSES  Diagnosis: Hypertension  Assessment and Plan of Treatment: continue acei and norvasc. Low sodium diet. Follow up with PCP    Diagnosis: Fungemia  Assessment and Plan of Treatment: s/p antifungal treatment completion    Diagnosis: Edema of lower extremity  Assessment and Plan of Treatment: resolved    Diagnosis: Heroin use disorder, moderate, in controlled environment, dependence  Assessment and Plan of Treatment: started on methadone inpatient. Patient taking 10mg daily and 5mg prn for breakthrough withdrawal symptoms. Follow up at [facility] methadone clinic on 7/1/2019 at 07:30AM.    Diagnosis: Tobacco dependence  Assessment and Plan of Treatment: nicotine patch prescribed and sent to pharmacy. Encourage tobacco cessation.    Diagnosis: Anemia, unspecified type  Assessment and Plan of Treatment: resolved

## 2019-06-16 NOTE — DISCHARGE NOTE PROVIDER - PROVIDER TOKENS
PROVIDER:[TOKEN:[[id number]]],PROVIDER:[TOKEN:[[id number]]],FREE:[LAST:[your regular primary doctor],PHONE:[(   )    -],FAX:[(   )    -]] PROVIDER:[TOKEN:[[id number]]],FREE:[LAST:[your regular primary doctor],PHONE:[(   )    -],FAX:[(   )    -]],PROVIDER:[TOKEN:[[id number]]]

## 2019-06-16 NOTE — PROGRESS NOTE ADULT - SUBJECTIVE AND OBJECTIVE BOX
lying in bed    Vital Signs Last 24 Hrs  T(C): 36.7 (16 Jun 2019 11:37), Max: 36.8 (15 David 2019 17:34)  T(F): 98.1 (16 Jun 2019 11:37), Max: 98.3 (15 David 2019 17:34)  HR: 85 (16 Jun 2019 11:37) (65 - 85)  BP: 136/86 (16 Jun 2019 11:37) (132/74 - 155/87)  BP(mean): --  RR: 18 (16 Jun 2019 11:37) (17 - 18)  SpO2: 98% (16 Jun 2019 11:37) (94% - 100%)    PHYSICAL EXAM:    general - AAO x 3  HEENT - No Icterus  CVS - RRR  RS - AE B/L  Abd - soft, NT  Ext - Pulses +        LABS:                        10.3   6.23  )-----------( 199      ( 15 David 2019 07:17 )             30.7     06-15    140  |  104  |  21  ----------------------------<  80  3.7   |  31  |  1.06    Ca    9.0      15 David 2019 07:17  Phos  3.7     06-15  Mg     2.4     06-15            Culture - Blood (collected 09 Jun 2019 12:21)  Source: .Blood  Final Report (14 Jun 2019 13:01):    No growth at 5 days.    Culture - Blood (collected 09 Jun 2019 01:45)  Source: .Blood  Final Report (14 Jun 2019 02:01):    No growth at 5 days.    Culture - Blood (collected 07 Jun 2019 23:09)  Source: .Blood  Final Report (13 Jun 2019 01:01):    No growth at 5 days.    Culture - Surgical Swab (collected 07 Jun 2019 23:02)  Source: .Surgical Swab LEFT FOREARM WOUND  Final Report (12 Jun 2019 17:32):    Rare Streptococcus intermedius "Susceptibilities not performed"    Culture - Other (collected 07 Jun 2019 01:21)  Source: Skin  Final Report (08 Jun 2019 21:27):    No growth at 48 hours    Culture - Urine (collected 07 Jun 2019 01:18)  Source: .Urine  Final Report (07 Jun 2019 18:54):    No growth    Culture - Blood (collected 06 Jun 2019 13:03)  Source: .Blood  Gram Stain (10 David 2019 13:42):    Growth in aerobic bottle: Yeast like cells  Final Report (10 David 2019 13:42):    Growth in aerobic bottle: Candida orthopsilosis    Fungal ELIO Interpretive Guidelines:    SDD*= Sensitive dose dependent    AMPHOTERICIN B= N/A; No interpretive guidelines available    FLUCONAZOLE= Data established for mucosal disease, and    is generally applicable for invasive disease.    Susceptibility is dependent on achieving    the maximal possible blood level.    Doses of 400 MG/day or more may be required in adults with    normal renal function and body habitus.    ITRACONAZOLE= Data apply only to mucosal disease.    Susceptibility is dependent on achieving    the maximal possible blood level.    Measure to assure drug absorption and    Itraconazole plasma concentrations of > 0.5 MCG/ML may    be required for optimal response.    FLUCYTOSINE= Breakpoints are basedon available clinical and    pharmacokinetic data.    VORICONAZOLE: The ELIO has been determined by the    colormetric microdilution method.  This test is not approved    by the FDA and is for research use only.  The performance    characteristics of this assayhave been established by [employer] and [place], [place].    Voriconazole has been approved by the FDA for primary    therapy for invasive Aspergillosis and as salvage    therapy for treatment of Scedosporium apiospermum and    Fusariosis.    CASPOFUNGIN= Is indicated in the treatment of candidemia,    intra-abdominal abscess, peritonitis, pleural space    infections and esophageal candidiasis.  Standardized    susceptability testing methods for Echinocandins have not    been establishedfor yeast and filamentous fungi, and    results of susceptibility studies do not necessarily    correlate with clinical outcomes.  Organism: Yeast (10 David 2019 13:42)  Organism: Yeast (10 David 2019 13:42)    Culture - Blood (collected 06 Jun 2019 13:03)  Source: .Blood  Gram Stain (10 David 2019 23:22):    Growth in aerobic bottle: Yeast like cells  Final Report (10 David 2019 23:22):    Growth in aerobic bottle: Candida orthopsilosis    See previous culture [id number]        RADIOLOGY & ADDITIONAL STUDIES:

## 2019-06-16 NOTE — PROGRESS NOTE ADULT - SUBJECTIVE AND OBJECTIVE BOX
39 year old male presents today for follow up, pt presents with a draining wound on his left forearm, pt was seen yesterday but did not want to stay, he did want to try oral antibiotics, he does admit to heroin use prior to his symptoms, he did have fever on Saturday and  then swelling which worsened, he denies tenderness +drainage of pus today - denies local pain - OF note -## he does not want anybody especially his fiance to know about his drug use## (2019 12:25)      Chief Complaint:  Patient is a 39y old  Male who presents with a chief complaint of cellultis (10 David 2019 19:57)      Review of Systems:    General:  No wt loss, fevers, chills, night sweats  Eyes:  Good vision, no reported pain  ENT:  No sore throat, pain, runny nose, dysphagia  CV:  No pain, palpitations, hypo/hypertension  Resp:  No dyspnea, cough, tachypnea, wheezing  GI:  No pain, nausea, vomiting, diarrhea, constipation  :  No pain, bleeding, incontinence, nocturia           Social History/Family History  SOCHX:   tobacco,  -  alcohol    FMHX: FA/MO  - contributory       Discussed with:  PMD, Family    Physical Exam:    Vital Signs:  Vital Signs Last 24 Hrs  T(C): 36.2 (2019 05:44), Max: 36.5 (10 David 2019 11:24)  T(F): 97.2 (2019 05:44), Max: 97.7 (10 David 2019 11:24)  HR: 66 (2019 05:44) (66 - 100)  BP: 155/98 (2019 05:44) (152/83 - 181/97)  BP(mean): --  RR: 18 (2019 05:44) (17 - 18)  SpO2: 98% (2019 05:44) (98% - 100%)  Daily     Daily Weight in k.4 (2019 05:44)  I&O's Summary    2019 07:01  -  10 David 2019 07:00  --------------------------------------------------------  IN: 2030 mL / OUT: 0 mL / NET: 2030 mL          Chest:  Full & symmetric excursion, no increased effort, breath sounds clear  Cardiovascular:  Regular rhythm, S1, S2, no murmur/rub/S3/S4,  Abdomen:  Soft, non-tender, non-distended, normoactive bowel sounds, no HSM        Assessment:  Bacteremia  TTE noted  ALEJANDRO no vegetation  For DC

## 2019-06-16 NOTE — PROGRESS NOTE ADULT - PROBLEM SELECTOR PLAN 1
- blood counts getting better  - probably anemia of inflammation  - f/u blood results as outoatient d/w patient who agrees to f/u  - will sign off, thank you

## 2019-06-17 LAB
ANION GAP SERPL CALC-SCNC: 8 MMOL/L — SIGNIFICANT CHANGE UP (ref 5–17)
BUN SERPL-MCNC: 23 MG/DL — SIGNIFICANT CHANGE UP (ref 7–23)
CALCIUM SERPL-MCNC: 8.5 MG/DL — SIGNIFICANT CHANGE UP (ref 8.5–10.1)
CHLORIDE SERPL-SCNC: 108 MMOL/L — SIGNIFICANT CHANGE UP (ref 96–108)
CO2 SERPL-SCNC: 27 MMOL/L — SIGNIFICANT CHANGE UP (ref 22–31)
CREAT SERPL-MCNC: 1.14 MG/DL — SIGNIFICANT CHANGE UP (ref 0.5–1.3)
ERYTHROCYTE [SEDIMENTATION RATE] IN BLOOD: 46 MM/HR — HIGH (ref 0–15)
GLUCOSE SERPL-MCNC: 91 MG/DL — SIGNIFICANT CHANGE UP (ref 70–99)
HCT VFR BLD CALC: 27.2 % — LOW (ref 39–50)
HGB BLD-MCNC: 9.4 G/DL — LOW (ref 13–17)
MAGNESIUM SERPL-MCNC: 2 MG/DL — SIGNIFICANT CHANGE UP (ref 1.6–2.6)
MCHC RBC-ENTMCNC: 26.6 PG — LOW (ref 27–34)
MCHC RBC-ENTMCNC: 34.6 GM/DL — SIGNIFICANT CHANGE UP (ref 32–36)
MCV RBC AUTO: 76.8 FL — LOW (ref 80–100)
NRBC # BLD: 0 /100 WBCS — SIGNIFICANT CHANGE UP (ref 0–0)
PHOSPHATE SERPL-MCNC: 4.3 MG/DL — SIGNIFICANT CHANGE UP (ref 2.5–4.5)
PLATELET # BLD AUTO: 146 K/UL — LOW (ref 150–400)
POTASSIUM SERPL-MCNC: 3.9 MMOL/L — SIGNIFICANT CHANGE UP (ref 3.5–5.3)
POTASSIUM SERPL-SCNC: 3.9 MMOL/L — SIGNIFICANT CHANGE UP (ref 3.5–5.3)
RBC # BLD: 3.54 M/UL — LOW (ref 4.2–5.8)
RBC # FLD: 13 % — SIGNIFICANT CHANGE UP (ref 10.3–14.5)
SODIUM SERPL-SCNC: 143 MMOL/L — SIGNIFICANT CHANGE UP (ref 135–145)
WBC # BLD: 6.25 K/UL — SIGNIFICANT CHANGE UP (ref 3.8–10.5)
WBC # FLD AUTO: 6.25 K/UL — SIGNIFICANT CHANGE UP (ref 3.8–10.5)

## 2019-06-17 PROCEDURE — 99233 SBSQ HOSP IP/OBS HIGH 50: CPT

## 2019-06-17 RX ORDER — FLUCONAZOLE 150 MG/1
400 TABLET ORAL DAILY
Refills: 0 | Status: DISCONTINUED | OUTPATIENT
Start: 2019-06-17 | End: 2019-07-01

## 2019-06-17 RX ADMIN — AMLODIPINE BESYLATE 10 MILLIGRAM(S): 2.5 TABLET ORAL at 05:39

## 2019-06-17 RX ADMIN — Medication 250 MILLIGRAM(S): at 00:30

## 2019-06-17 RX ADMIN — METHADONE HYDROCHLORIDE 5 MILLIGRAM(S): 40 TABLET ORAL at 08:51

## 2019-06-17 RX ADMIN — Medication 250 MILLIGRAM(S): at 08:49

## 2019-06-17 RX ADMIN — ZOLPIDEM TARTRATE 10 MILLIGRAM(S): 10 TABLET ORAL at 22:25

## 2019-06-17 RX ADMIN — Medication 250 MILLIGRAM(S): at 16:01

## 2019-06-17 RX ADMIN — PANTOPRAZOLE SODIUM 40 MILLIGRAM(S): 20 TABLET, DELAYED RELEASE ORAL at 07:06

## 2019-06-17 RX ADMIN — LISINOPRIL 40 MILLIGRAM(S): 2.5 TABLET ORAL at 05:39

## 2019-06-17 RX ADMIN — Medication 1 PATCH: at 11:40

## 2019-06-17 RX ADMIN — METHADONE HYDROCHLORIDE 5 MILLIGRAM(S): 40 TABLET ORAL at 22:25

## 2019-06-17 RX ADMIN — Medication 1 PATCH: at 08:49

## 2019-06-17 RX ADMIN — Medication 250 MILLIGRAM(S): at 23:50

## 2019-06-17 RX ADMIN — MICAFUNGIN SODIUM 100 MILLIGRAM(S): 100 INJECTION, POWDER, LYOPHILIZED, FOR SOLUTION INTRAVENOUS at 17:57

## 2019-06-17 NOTE — PROGRESS NOTE ADULT - SUBJECTIVE AND OBJECTIVE BOX
39 year old male presents today for follow up, pt presents with a draining wound on his left forearm, pt was seen yesterday but did not want to stay, he did want to try oral antibiotics, he does admit to heroin use prior to his symptoms, he did have fever on Saturday and  then swelling which worsened, he denies tenderness +drainage of pus today - denies local pain - OF note -## he does not want anybody especially his fiance to know about his drug use## (2019 12:25)      Chief Complaint:  Patient is a 39y old  Male who presents with a chief complaint of cellultis (10 David 2019 19:57)      Review of Systems:    General:  No wt loss, fevers, chills, night sweats  Eyes:  Good vision, no reported pain  ENT:  No sore throat, pain, runny nose, dysphagia  CV:  No pain, palpitations, hypo/hypertension  Resp:  No dyspnea, cough, tachypnea, wheezing  GI:  No pain, nausea, vomiting, diarrhea, constipation  :  No pain, bleeding, incontinence, nocturia           Social History/Family History  SOCHX:   tobacco,  -  alcohol    FMHX: FA/MO  - contributory       Discussed with:  PMD, Family    Physical Exam:    Vital Signs:  Vital Signs Last 24 Hrs  T(C): 36.2 (2019 05:44), Max: 36.5 (10 David 2019 11:24)  T(F): 97.2 (2019 05:44), Max: 97.7 (10 David 2019 11:24)  HR: 66 (2019 05:44) (66 - 100)  BP: 155/98 (2019 05:44) (152/83 - 181/97)  BP(mean): --  RR: 18 (2019 05:44) (17 - 18)  SpO2: 98% (2019 05:44) (98% - 100%)  Daily     Daily Weight in k.4 (2019 05:44)  I&O's Summary    2019 07:01  -  10 David 2019 07:00  --------------------------------------------------------  IN: 2030 mL / OUT: 0 mL / NET: 2030 mL          Chest:  Full & symmetric excursion, no increased effort, breath sounds clear  Cardiovascular:  Regular rhythm, S1, S2, no murmur/rub/S3/S4,  Abdomen:  Soft, non-tender, non-distended, normoactive bowel sounds, no HSM        Assessment:  Bacteremia  TTE noted  ALEJANDRO no vegetation  CV risk is acceptable for PICC

## 2019-06-17 NOTE — PROGRESS NOTE ADULT - ASSESSMENT
intravenous drug addict suppurative thrombo phlebitis   cns candida   micro final blood culture is cns   as per micro pcr was methicillin resitant staph aureus though grew cns   ALEJANDRO NEGATIVE   plan to finish 3 weeks iv vanco from admission   switc to po diflucan today   end date  for both is 6/28/2019

## 2019-06-17 NOTE — PROGRESS NOTE ADULT - SUBJECTIVE AND OBJECTIVE BOX
39 year old male presents today for follow up, pt presents with a draining wound on his left forearm, pt was seen yesterday but did not want to stay, he did want to try oral antibiotics, he does admit to heroin use prior to his symptoms, he did have fever on Saturday and Sunday then swelling which worsened, he denies tenderness +drainage of pus today - denies local pain -   much better   discussed with primary care physician several times yesterday and today       Allergies    No Known Allergies    Intolerances        MEDICATIONS  (STANDING):  amLODIPine   Tablet 10 milliGRAM(s) Oral daily  fluconAZOLE   Tablet 400 milliGRAM(s) Oral daily  lisinopril 40 milliGRAM(s) Oral daily  nicotine - 21 mG/24Hr(s) Patch 1 patch Transdermal daily  pantoprazole    Tablet 40 milliGRAM(s) Oral before breakfast  vancomycin  IVPB 750 milliGRAM(s) IV Intermittent every 8 hours    MEDICATIONS  (PRN):  acetaminophen   Tablet .. 650 milliGRAM(s) Oral every 6 hours PRN Mild Pain (1 - 3)  methadone    Tablet 5 milliGRAM(s) Oral every 4 hours PRN opiate cravings or severe pain      REVIEW OF SYSTEMS:    CONSTITUTIONAL: No fever, chills, weight loss, or fatigue  HEENT: No sore throat, runny nose, ear ache  RESPIRATORY: No cough, wheezing, No shortness of breath  CARDIOVASCULAR: No chest pain, palpitations, dizziness  GASTROINTESTINAL: No abdominal pain. No nausea, vomiting, diarrhea  GENITOURINARY: No dysuria, increase frequency, hematuria, or incontinence  NEUROLOGICAL: No headaches, memory loss, loss of strength, numbness, or tremors, no weakness  EXTREMITY: No pedal edema BLE  SKIN: No itching, burning, rashes, or lesions     VITAL SIGNS:  T(C): 37 (06-17-19 @ 18:21), Max: 37 (06-17-19 @ 18:21)  T(F): 98.6 (06-17-19 @ 18:21), Max: 98.6 (06-17-19 @ 18:21)  HR: 91 (06-17-19 @ 18:21) (60 - 91)  BP: 146/92 (06-17-19 @ 18:21) (128/78 - 153/90)  RR: 18 (06-17-19 @ 18:21) (18 - 18)  SpO2: 98% (06-17-19 @ 18:21) (98% - 100%)  Wt(kg): --    PHYSICAL EXAM:    GENERAL: not in any distress  HEENT: Neck is supple, normocephalic, atraumatic   CHEST/LUNG: Clear to auscultation bilaterally; No rales, rhonchi, wheezing  HEART: Regular rate and rhythm; No murmurs, rubs, or gallops  ABDOMEN: Soft, Nontender, Nondistended; Bowel sounds present, no rebound   EXTREMITIES:  2+ Peripheral Pulses, No clubbing, cyanosis, or edema  arm is much better  SKIN: No rashes or lesions  BACK: no pressor sore   NERVOUS SYSTEM:  Alert & Oriented X3, Good concentration  PSYCH: normal affect     LABS:                         9.4    6.25  )-----------( 146      ( 17 Jun 2019 06:52 )             27.2     06-17    143  |  108  |  23  ----------------------------<  91  3.9   |  27  |  1.14    Ca    8.5      17 Jun 2019 06:52  Phos  4.3     06-17  Mg     2.0     06-17                      Sedimentation Rate, Erythrocyte: 46 mm/hr (06-17 @ 06:52)  Sedimentation Rate, Erythrocyte: 38 mm/hr (06-13 @ 07:23)      Vancomycin Level, Trough: 17.9 ug/mL (06-16 @ 08:58)                      Radiology:

## 2019-06-17 NOTE — PROGRESS NOTE ADULT - SUBJECTIVE AND OBJECTIVE BOX
INTERVAL HPI/OVERNIGHT EVENTS:       No events over night.    MEDICATIONS  (STANDING):  amLODIPine   Tablet 10 milliGRAM(s) Oral daily  lisinopril 40 milliGRAM(s) Oral daily  micafungin IVPB      micafungin IVPB 150 milliGRAM(s) IV Intermittent every 24 hours  nicotine - 21 mG/24Hr(s) Patch 1 patch Transdermal daily  pantoprazole    Tablet 40 milliGRAM(s) Oral before breakfast  vancomycin  IVPB 750 milliGRAM(s) IV Intermittent every 8 hours  zolpidem 10 milliGRAM(s) Oral at bedtime    MEDICATIONS  (PRN):  acetaminophen   Tablet .. 650 milliGRAM(s) Oral every 6 hours PRN Mild Pain (1 - 3)  methadone    Tablet 5 milliGRAM(s) Oral every 4 hours PRN opiate cravings or severe pain      Vital Signs Last 24 Hrs  T(C): 36.2 (16 Jun 2019 05:52), Max: 36.8 (15 David 2019 17:34)  T(F): 97.2 (16 Jun 2019 05:52), Max: 98.3 (15 David 2019 17:34)  HR: 65 (16 Jun 2019 05:52) (65 - 84)  BP: 144/71 (16 Jun 2019 05:52) (130/79 - 155/87)  BP(mean): --  RR: 18 (16 Jun 2019 05:52) (17 - 18)  SpO2: 98% (16 Jun 2019 05:52) (94% - 100%)    PHYSICAL EXAM:    GENERAL: NAD, well-groomed, well-developed, awake, alert, oriented x 3, fluent and coherent speech  HEAD:  Atraumatic, Normocephalic  EYES: EOMI, conjunctiva and sclera clear  NECK: Supple  NERVOUS SYSTEM:  Good concentration; Moving all 4 extremities; No gross sensory deficits, No facial droop  CHEST WALL: No masses  CHEST/LUNG: Clear to auscultation bilaterally; No rales, rhonchi, wheezing, or rubs  HEART: Regular rate and rhythm; No murmurs, rubs, or gallops  ABDOMEN: Soft, Nontender, Nondistended, Bowel sounds present, No palpable masses or organomegaly, No bruits  EXTREMITIES:  left arm forearm s/p I&D. minimal surrounding cellulites    edema to lower extremities  SKIN: No rashes or lesions                      LABS:                        9.4    6.25  )-----------( 146      ( 17 Jun 2019 06:52 )             27.2     06-17    143  |  108  |  23  ----------------------------<  91  3.9   |  27  |  1.14    Ca    8.5      17 Jun 2019 06:52  Phos  4.3     06-17  Mg     2.0     06-17

## 2019-06-17 NOTE — PROGRESS NOTE ADULT - PROBLEM SELECTOR PLAN 1
s/p I and D by plastics.  Blood cultures positive for Candida and MRSA, continue with Vanc ( day 10),  and     Micafungin ( day 6)   was on Diflucan but blood cx resulted candida again.   s/p ALEJANDRO  and no evidence of vegetation     blood cultures from 6/7, 6/9 are negative will d/w Mary about d/c plan s.     HIV non reactive.   6/14/19 d/w Dr. Hernandez will need 3 weeks of antibx from 6/6/19 forward. pt will need picc line for IV vanco and  po diflucan 400mg daily to go to rehab as has h/o IVDA   [facility] would not get auth until Monday or Tuesday at best will plan for picc line after placement is secured

## 2019-06-17 NOTE — PROGRESS NOTE ADULT - SUBJECTIVE AND OBJECTIVE BOX
Pt stable - on IV micafungin  H/H stable - stools were + for OB      MEDICATIONS  (STANDING):  amLODIPine   Tablet 10 milliGRAM(s) Oral daily  lisinopril 40 milliGRAM(s) Oral daily  micafungin IVPB      micafungin IVPB 150 milliGRAM(s) IV Intermittent every 24 hours  nicotine - 21 mG/24Hr(s) Patch 1 patch Transdermal daily  pantoprazole    Tablet 40 milliGRAM(s) Oral before breakfast  vancomycin  IVPB 750 milliGRAM(s) IV Intermittent every 8 hours  zolpidem 10 milliGRAM(s) Oral at bedtime    MEDICATIONS  (PRN):  acetaminophen   Tablet .. 650 milliGRAM(s) Oral every 6 hours PRN Mild Pain (1 - 3)  methadone    Tablet 5 milliGRAM(s) Oral every 4 hours PRN opiate cravings or severe pain      Allergies    No Known Allergies    Intolerances        Vital Signs Last 24 Hrs  T(C): 36.4 (17 Jun 2019 11:27), Max: 37.1 (16 Jun 2019 17:08)  T(F): 97.6 (17 Jun 2019 11:27), Max: 98.8 (16 Jun 2019 17:08)  HR: 84 (17 Jun 2019 11:27) (60 - 84)  BP: 150/64 (17 Jun 2019 11:27) (128/78 - 153/90)  BP(mean): --  RR: 18 (17 Jun 2019 11:27) (16 - 18)  SpO2: 98% (17 Jun 2019 11:27) (98% - 100%)    PHYSICAL EXAM:  General: NAD.  CVS: S1, S2  Chest: air entry bilaterally present  Abd: BS present, soft, non-tender      LABS:                        9.4    6.25  )-----------( 146      ( 17 Jun 2019 06:52 )             27.2     06-17    143  |  108  |  23  ----------------------------<  91  3.9   |  27  |  1.14    Ca    8.5      17 Jun 2019 06:52  Phos  4.3     06-17  Mg     2.0     06-17        will likely need further GI workup - may be done as outpt

## 2019-06-18 PROCEDURE — 99233 SBSQ HOSP IP/OBS HIGH 50: CPT

## 2019-06-18 RX ORDER — ZOLPIDEM TARTRATE 10 MG/1
10 TABLET ORAL AT BEDTIME
Refills: 0 | Status: DISCONTINUED | OUTPATIENT
Start: 2019-06-18 | End: 2019-06-25

## 2019-06-18 RX ORDER — METHADONE HYDROCHLORIDE 40 MG/1
5 TABLET ORAL
Refills: 0 | Status: DISCONTINUED | OUTPATIENT
Start: 2019-06-18 | End: 2019-06-23

## 2019-06-18 RX ADMIN — Medication 1 PATCH: at 07:54

## 2019-06-18 RX ADMIN — METHADONE HYDROCHLORIDE 5 MILLIGRAM(S): 40 TABLET ORAL at 22:00

## 2019-06-18 RX ADMIN — Medication 1 PATCH: at 12:04

## 2019-06-18 RX ADMIN — METHADONE HYDROCHLORIDE 5 MILLIGRAM(S): 40 TABLET ORAL at 09:00

## 2019-06-18 RX ADMIN — ZOLPIDEM TARTRATE 10 MILLIGRAM(S): 10 TABLET ORAL at 22:01

## 2019-06-18 RX ADMIN — Medication 250 MILLIGRAM(S): at 16:16

## 2019-06-18 RX ADMIN — Medication 1 PATCH: at 12:05

## 2019-06-18 RX ADMIN — FLUCONAZOLE 400 MILLIGRAM(S): 150 TABLET ORAL at 12:04

## 2019-06-18 RX ADMIN — Medication 250 MILLIGRAM(S): at 23:49

## 2019-06-18 RX ADMIN — PANTOPRAZOLE SODIUM 40 MILLIGRAM(S): 20 TABLET, DELAYED RELEASE ORAL at 07:54

## 2019-06-18 RX ADMIN — LISINOPRIL 40 MILLIGRAM(S): 2.5 TABLET ORAL at 06:11

## 2019-06-18 RX ADMIN — Medication 250 MILLIGRAM(S): at 07:54

## 2019-06-18 RX ADMIN — AMLODIPINE BESYLATE 10 MILLIGRAM(S): 2.5 TABLET ORAL at 06:11

## 2019-06-18 NOTE — PROGRESS NOTE ADULT - ASSESSMENT
39 years old male with history of heroine abuse with cellultis failed after one days of outpatient oral antibiotics for admission MRSA abscess and bacteremia and fungemia. Pt remains afebrile abd normal wbc, WIll need IV tx till 6/28 and likely be hospitalized to complete tx

## 2019-06-18 NOTE — PROGRESS NOTE ADULT - SUBJECTIVE AND OBJECTIVE BOX
INTERVAL HPI/OVERNIGHT EVENTS:       No events over night.    MEDICATIONS  (STANDING):  amLODIPine   Tablet 10 milliGRAM(s) Oral daily  fluconAZOLE   Tablet 400 milliGRAM(s) Oral daily  lisinopril 40 milliGRAM(s) Oral daily  nicotine - 21 mG/24Hr(s) Patch 1 patch Transdermal daily  pantoprazole    Tablet 40 milliGRAM(s) Oral before breakfast  vancomycin  IVPB 750 milliGRAM(s) IV Intermittent every 8 hours  zolpidem 10 milliGRAM(s) Oral at bedtime    MEDICATIONS  (PRN):  acetaminophen   Tablet .. 650 milliGRAM(s) Oral every 6 hours PRN Mild Pain (1 - 3)  methadone    Tablet 5 milliGRAM(s) Oral two times a day PRN opiate cravings or severe pain      Vital Signs Last 24 Hrs  T(C): 36.6 (18 Jun 2019 11:51), Max: 37 (17 Jun 2019 18:21)  T(F): 97.8 (18 Jun 2019 11:51), Max: 98.6 (17 Jun 2019 18:21)  HR: 79 (18 Jun 2019 11:51) (67 - 91)  BP: 132/80 (18 Jun 2019 11:51) (115/60 - 146/92)  BP(mean): --  RR: 18 (18 Jun 2019 11:51) (18 - 18)  SpO2: 98% (18 Jun 2019 11:51) (97% - 98%)    GENERAL: NAD, well-groomed, well-developed, awake, alert, oriented x 3, fluent and coherent speech  HEAD:  Atraumatic, Normocephalic  EYES: EOMI, conjunctiva and sclera clear  NECK: Supple  NERVOUS SYSTEM:  Good concentration; Moving all 4 extremities; No gross sensory deficits, No facial droop  CHEST WALL: No masses  CHEST/LUNG: Clear to auscultation bilaterally; No rales, rhonchi, wheezing, or rubs  HEART: Regular rate and rhythm; No murmurs, rubs, or gallops  ABDOMEN: Soft, Nontender, Nondistended, Bowel sounds present, No palpable masses or organomegaly, No bruits  EXTREMITIES:  left arm forearm s/p I&D. minimal surrounding cellulites    edema to lower extremities  SKIN: No rashes or lesions                      LABS:                                   9.4    6.25  )-----------( 146      ( 17 Jun 2019 06:52 )             27.2     06-17    143  |  108  |  23  ----------------------------<  91  3.9   |  27  |  1.14    Ca    8.5      17 Jun 2019 06:52  Phos  4.3     06-17  Mg     2.0     06-17

## 2019-06-18 NOTE — PROGRESS NOTE ADULT - PROBLEM SELECTOR PLAN 1
s/p I and D by plastics.  Blood cultures positive for Candida and MRSA, continue with Vanc and fluconazole   s/p ALEJANDRO  and no evidence of vegetation

## 2019-06-18 NOTE — PROGRESS NOTE ADULT - SUBJECTIVE AND OBJECTIVE BOX
39 year old male presents today for follow up, pt presents with a draining wound on his left forearm, pt was seen yesterday but did not want to stay, he did want to try oral antibiotics, he does admit to heroin use prior to his symptoms, he did have fever on Saturday and  then swelling which worsened, he denies tenderness +drainage of pus today - denies local pain - OF note -## he does not want anybody especially his fiance to know about his drug use## (2019 12:25)      Chief Complaint:  Patient is a 39y old  Male who presents with a chief complaint of cellultis (10 David 2019 19:57)      Review of Systems:    General:  No wt loss, fevers, chills, night sweats  Eyes:  Good vision, no reported pain  ENT:  No sore throat, pain, runny nose, dysphagia  CV:  No pain, palpitations, hypo/hypertension  Resp:  No dyspnea, cough, tachypnea, wheezing  GI:  No pain, nausea, vomiting, diarrhea, constipation  :  No pain, bleeding, incontinence, nocturia           Social History/Family History  SOCHX:   tobacco,  -  alcohol    FMHX: FA/MO  - contributory       Discussed with:  PMD, Family    Physical Exam:    Vital Signs:  Vital Signs Last 24 Hrs  T(C): 36.2 (2019 05:44), Max: 36.5 (10 Davdi 2019 11:24)  T(F): 97.2 (2019 05:44), Max: 97.7 (10 David 2019 11:24)  HR: 66 (2019 05:44) (66 - 100)  BP: 155/98 (2019 05:44) (152/83 - 181/97)  BP(mean): --  RR: 18 (2019 05:44) (17 - 18)  SpO2: 98% (2019 05:44) (98% - 100%)  Daily     Daily Weight in k.4 (2019 05:44)  I&O's Summary    2019 07:01  -  10 David 2019 07:00  --------------------------------------------------------  IN: 2030 mL / OUT: 0 mL / NET: 2030 mL          Chest:  Full & symmetric excursion, no increased effort, breath sounds clear  Cardiovascular:  Regular rhythm, S1, S2, no murmur/rub/S3/S4,  Abdomen:  Soft, non-tender, non-distended, normoactive bowel sounds, no HSM        Assessment:  Bacteremia  TTE noted  ALEJANDRO no vegetation  CV risk is acceptable for PICC  Patient requires long term ABX IV

## 2019-06-18 NOTE — PROGRESS NOTE ADULT - SUBJECTIVE AND OBJECTIVE BOX
Pt now on PO diflucan and IV Vanco  stable  no bleeding      MEDICATIONS  (STANDING):  amLODIPine   Tablet 10 milliGRAM(s) Oral daily  fluconAZOLE   Tablet 400 milliGRAM(s) Oral daily  lisinopril 40 milliGRAM(s) Oral daily  nicotine - 21 mG/24Hr(s) Patch 1 patch Transdermal daily  pantoprazole    Tablet 40 milliGRAM(s) Oral before breakfast  vancomycin  IVPB 750 milliGRAM(s) IV Intermittent every 8 hours    MEDICATIONS  (PRN):  acetaminophen   Tablet .. 650 milliGRAM(s) Oral every 6 hours PRN Mild Pain (1 - 3)  methadone    Tablet 5 milliGRAM(s) Oral two times a day PRN opiate cravings or severe pain      Allergies    No Known Allergies    Intolerances        Vital Signs Last 24 Hrs  T(C): 36.5 (18 Jun 2019 05:27), Max: 37 (17 Jun 2019 18:21)  T(F): 97.7 (18 Jun 2019 05:27), Max: 98.6 (17 Jun 2019 18:21)  HR: 72 (18 Jun 2019 05:27) (67 - 91)  BP: 120/80 (18 Jun 2019 05:27) (115/60 - 146/92)  BP(mean): --  RR: 18 (18 Jun 2019 05:27) (18 - 18)  SpO2: 98% (18 Jun 2019 05:27) (97% - 98%)    PHYSICAL EXAM:  General: NAD.  CVS: S1, S2  Chest: air entry bilaterally present  Abd: BS present, soft, non-tender      LABS:                        9.4    6.25  )-----------( 146      ( 17 Jun 2019 06:52 )             27.2     06-17    143  |  108  |  23  ----------------------------<  91  3.9   |  27  |  1.14    Ca    8.5      17 Jun 2019 06:52  Phos  4.3     06-17  Mg     2.0     06-17        continue present meds  H/H stable - will need further evaluation after d/c as outpt

## 2019-06-18 NOTE — CHART NOTE - NSCHARTNOTEFT_GEN_A_CORE
rUbano Rene was admitted to [facility] on 6/6/19 and is still currently being treated and requiring hospitalization.     Gurinder Richey MD

## 2019-06-19 LAB
HCT VFR BLD CALC: 29.5 % — LOW (ref 39–50)
HGB BLD-MCNC: 10.1 G/DL — LOW (ref 13–17)
MCHC RBC-ENTMCNC: 25.6 PG — LOW (ref 27–34)
MCHC RBC-ENTMCNC: 34.2 GM/DL — SIGNIFICANT CHANGE UP (ref 32–36)
MCV RBC AUTO: 74.9 FL — LOW (ref 80–100)
NRBC # BLD: 0 /100 WBCS — SIGNIFICANT CHANGE UP (ref 0–0)
PLATELET # BLD AUTO: 143 K/UL — LOW (ref 150–400)
RBC # BLD: 3.94 M/UL — LOW (ref 4.2–5.8)
RBC # FLD: 13.2 % — SIGNIFICANT CHANGE UP (ref 10.3–14.5)
WBC # BLD: 6.69 K/UL — SIGNIFICANT CHANGE UP (ref 3.8–10.5)
WBC # FLD AUTO: 6.69 K/UL — SIGNIFICANT CHANGE UP (ref 3.8–10.5)

## 2019-06-19 PROCEDURE — 99232 SBSQ HOSP IP/OBS MODERATE 35: CPT

## 2019-06-19 RX ADMIN — METHADONE HYDROCHLORIDE 5 MILLIGRAM(S): 40 TABLET ORAL at 14:42

## 2019-06-19 RX ADMIN — Medication 1 PATCH: at 07:42

## 2019-06-19 RX ADMIN — AMLODIPINE BESYLATE 10 MILLIGRAM(S): 2.5 TABLET ORAL at 06:47

## 2019-06-19 RX ADMIN — Medication 250 MILLIGRAM(S): at 18:03

## 2019-06-19 RX ADMIN — Medication 1 PATCH: at 11:09

## 2019-06-19 RX ADMIN — LISINOPRIL 40 MILLIGRAM(S): 2.5 TABLET ORAL at 06:47

## 2019-06-19 RX ADMIN — FLUCONAZOLE 400 MILLIGRAM(S): 150 TABLET ORAL at 11:13

## 2019-06-19 RX ADMIN — Medication 1 PATCH: at 11:08

## 2019-06-19 RX ADMIN — ZOLPIDEM TARTRATE 10 MILLIGRAM(S): 10 TABLET ORAL at 21:31

## 2019-06-19 RX ADMIN — Medication 250 MILLIGRAM(S): at 07:39

## 2019-06-19 RX ADMIN — PANTOPRAZOLE SODIUM 40 MILLIGRAM(S): 20 TABLET, DELAYED RELEASE ORAL at 07:39

## 2019-06-19 RX ADMIN — Medication 1 PATCH: at 18:04

## 2019-06-19 NOTE — PROGRESS NOTE ADULT - SUBJECTIVE AND OBJECTIVE BOX
39 year old male presents today for follow up, pt presents with a draining wound on his left forearm, pt was seen yesterday but did not want to stay, he did want to try oral antibiotics, he does admit to heroin use prior to his symptoms, he did have fever on Saturday and  then swelling which worsened, he denies tenderness +drainage of pus today - denies local pain - OF note -## he does not want anybody especially his fiance to know about his drug use## (2019 12:25)      Chief Complaint:  Patient is a 39y old  Male who presents with a chief complaint of cellultis (10 David 2019 19:57)      Review of Systems:    General:  No wt loss, fevers, chills, night sweats  Eyes:  Good vision, no reported pain  ENT:  No sore throat, pain, runny nose, dysphagia  CV:  No pain, palpitations, hypo/hypertension  Resp:  No dyspnea, cough, tachypnea, wheezing  GI:  No pain, nausea, vomiting, diarrhea, constipation  :  No pain, bleeding, incontinence, nocturia           Social History/Family History  SOCHX:   tobacco,  -  alcohol    FMHX: FA/MO  - contributory       Discussed with:  PMD, Family    Physical Exam:    Vital Signs:  Vital Signs Last 24 Hrs  T(C): 36.2 (2019 05:44), Max: 36.5 (10 David 2019 11:24)  T(F): 97.2 (2019 05:44), Max: 97.7 (10 David 2019 11:24)  HR: 66 (2019 05:44) (66 - 100)  BP: 155/98 (2019 05:44) (152/83 - 181/97)  BP(mean): --  RR: 18 (2019 05:44) (17 - 18)  SpO2: 98% (2019 05:44) (98% - 100%)  Daily     Daily Weight in k.4 (2019 05:44)  I&O's Summary    2019 07:01  -  10 David 2019 07:00  --------------------------------------------------------  IN: 2030 mL / OUT: 0 mL / NET: 2030 mL          Chest:  Full & symmetric excursion, no increased effort, breath sounds clear  Cardiovascular:  Regular rhythm, S1, S2, no murmur/rub/S3/S4,  Abdomen:  Soft, non-tender, non-distended, normoactive bowel sounds, no HSM        Assessment:  Bacteremia  TTE noted  ALEJANDRO no vegetation  CV risk is acceptable for PICC  Patient requires long term ABX IV  Psych noted

## 2019-06-19 NOTE — PROGRESS NOTE ADULT - ASSESSMENT
intravenous drug addict suppurative thrombo phlebitis   cns candida   micro final blood culture is cns   as per micro pcr was methicillin resitant staph aureus though grew cns   ALEJANDRO NEGATIVE   plan to finish 3 weeks iv vanco from admission   switc to po diflucan today   end date  for both is 6/28/2019 intravenous drug addict suppurative thrombo phlebitis   cns candida   micro final blood culture is cns   as per micro pcr was methicillin resitant staph aureus though grew cns   ALEJANDRO NEGATIVE   plan to finish 3 weeks iv vanco from admission   diflucan 400 daily   end date  for both is 6/28/2019  discussed with patient   will see his addiction MD by 6/29 /2019

## 2019-06-19 NOTE — PROGRESS NOTE ADULT - SUBJECTIVE AND OBJECTIVE BOX
INTERVAL HPI/OVERNIGHT EVENTS:       No events over night.    MEDICATIONS  (STANDING):  amLODIPine   Tablet 10 milliGRAM(s) Oral daily  fluconAZOLE   Tablet 400 milliGRAM(s) Oral daily  lisinopril 40 milliGRAM(s) Oral daily  nicotine - 21 mG/24Hr(s) Patch 1 patch Transdermal daily  pantoprazole    Tablet 40 milliGRAM(s) Oral before breakfast  vancomycin  IVPB 750 milliGRAM(s) IV Intermittent every 8 hours  zolpidem 10 milliGRAM(s) Oral at bedtime    MEDICATIONS  (PRN):  acetaminophen   Tablet .. 650 milliGRAM(s) Oral every 6 hours PRN Mild Pain (1 - 3)  methadone    Tablet 5 milliGRAM(s) Oral two times a day PRN opiate cravings or severe pain      Vital Signs Last 24 Hrs  T(C): 36.5 (19 Jun 2019 10:58), Max: 36.6 (18 Jun 2019 18:04)  T(F): 97.7 (19 Jun 2019 10:58), Max: 97.9 (18 Jun 2019 18:04)  HR: 84 (19 Jun 2019 10:58) (73 - 122)  BP: 118/89 (19 Jun 2019 10:58) (118/89 - 130/77)  BP(mean): --  RR: 18 (19 Jun 2019 10:58) (18 - 18)  SpO2: 99% (19 Jun 2019 10:58) (97% - 99%)  GENERAL: NAD, well-groomed, well-developed, awake, alert, oriented x 3, fluent and coherent speech  HEAD:  Atraumatic, Normocephalic  EYES: EOMI, conjunctiva and sclera clear  NECK: Supple  NERVOUS SYSTEM:  Good concentration; Moving all 4 extremities; No gross sensory deficits, No facial droop  CHEST WALL: No masses  CHEST/LUNG: Clear to auscultation bilaterally; No rales, rhonchi, wheezing, or rubs  HEART: Regular rate and rhythm; No murmurs, rubs, or gallops  ABDOMEN: Soft, Nontender, Nondistended, Bowel sounds present, No palpable masses or organomegaly, No bruits  EXTREMITIES:  left arm forearm s/p I&D. minimal surrounding cellulites    edema to lower extremities  SKIN: No rashes or lesions                      LABS:                                   9.4    6.25  )-----------( 146      ( 17 Jun 2019 06:52 )             27.2     06-17    143  |  108  |  23  ----------------------------<  91  3.9   |  27  |  1.14    Ca    8.5      17 Jun 2019 06:52  Phos  4.3     06-17  Mg     2.0     06-17

## 2019-06-19 NOTE — PROGRESS NOTE ADULT - SUBJECTIVE AND OBJECTIVE BOX
Pt sable - no bleeding  on IV antibx as per ID      MEDICATIONS  (STANDING):  amLODIPine   Tablet 10 milliGRAM(s) Oral daily  fluconAZOLE   Tablet 400 milliGRAM(s) Oral daily  lisinopril 40 milliGRAM(s) Oral daily  nicotine - 21 mG/24Hr(s) Patch 1 patch Transdermal daily  pantoprazole    Tablet 40 milliGRAM(s) Oral before breakfast  vancomycin  IVPB 750 milliGRAM(s) IV Intermittent every 8 hours  zolpidem 10 milliGRAM(s) Oral at bedtime    MEDICATIONS  (PRN):  acetaminophen   Tablet .. 650 milliGRAM(s) Oral every 6 hours PRN Mild Pain (1 - 3)  methadone    Tablet 5 milliGRAM(s) Oral two times a day PRN opiate cravings or severe pain      Allergies    No Known Allergies    Intolerances        Vital Signs Last 24 Hrs  T(C): 36.5 (19 Jun 2019 10:58), Max: 36.6 (18 Jun 2019 11:51)  T(F): 97.7 (19 Jun 2019 10:58), Max: 97.9 (18 Jun 2019 18:04)  HR: 84 (19 Jun 2019 10:58) (73 - 122)  BP: 118/89 (19 Jun 2019 10:58) (118/89 - 132/80)  BP(mean): --  RR: 18 (19 Jun 2019 10:58) (18 - 18)  SpO2: 99% (19 Jun 2019 10:58) (97% - 99%)    PHYSICAL EXAM:  General: NAD.  CVS: S1, S2  Chest: air entry bilaterally present  Abd: BS present, soft, non-tender      LABS:                        10.1   6.69  )-----------( 143      ( 19 Jun 2019 06:46 )             29.5         labs stable  suggest outpatient f/u after discharge  please re-consult GI if needed

## 2019-06-19 NOTE — CHART NOTE - NSCHARTNOTEFT_GEN_A_CORE
Assessment: Pt admitted with upper extremity L limb abscess. PT with anemia, poly substance abuse, HTN & fungemia. Pt denied any N/V/D/C. Pt reported a good appetite, and eating everything. Encouraged pt to continue good PO to promote overall health.     Factors impacting intake: [ x ] none [ ] nausea  [ ] vomiting [ ] diarrhea [ ] constipation  [ ]chewing problems [ ] swallowing issues  [ ] other:     Diet Prescription: Diet, Regular (06-07-19 @ 20:30)    Intake: %    Current Weight: Weight (kg): 88.5 (06-14 @ 10:48); 92.3 (06-19)  % Weight Change: 4% gain (changes in edema noted)     Physical appearance: BMI 29.3; BILAT leg, ankle, foot 1+ edema noted     Pertinent Medications: MEDICATIONS  (STANDING):  amLODIPine   Tablet 10 milliGRAM(s) Oral daily  fluconAZOLE   Tablet 400 milliGRAM(s) Oral daily  lisinopril 40 milliGRAM(s) Oral daily  nicotine - 21 mG/24Hr(s) Patch 1 patch Transdermal daily  pantoprazole    Tablet 40 milliGRAM(s) Oral before breakfast  vancomycin  IVPB 750 milliGRAM(s) IV Intermittent every 8 hours  zolpidem 10 milliGRAM(s) Oral at bedtime    MEDICATIONS  (PRN):  acetaminophen   Tablet .. 650 milliGRAM(s) Oral every 6 hours PRN Mild Pain (1 - 3)  methadone    Tablet 5 milliGRAM(s) Oral two times a day PRN opiate cravings or severe pain    Pertinent Labs: 06-19 Na n/a   Glu n/a   K+ n/a   Cr n/a   BUN n/a   Phos n/a   Alb n/a   PAB n/a   Hgb 10.1 g/dL<L> Hct 29.5 %<L> HgA1C n/a     24Hr FS:  Skin: Wound- upper left extremity abscess      Estimated Needs:   [x ] no change since previous assessment (06-12)  [ ] recalculated:     Previous Nutrition Diagnosis: none     Nutrition Diagnosis is [ ] ongoing  [ ] resolved  [ ] improved  [ x ] not applicable   Previous Goal: n/a    New Nutrition Diagnosis: [x ] not applicable       Interventions:   Recommend  [x ] Continue: current diet Rx   [ ] Change Diet To:  [ ] Nutrition Supplement:  [ ] Nutrition Support:  [ ] Other:     Monitoring and Evaluation:   [ x ] PO intake [ x ] Tolerance to diet prescription [ x ] weights [ x ] labs[ x ] follow up per protocol  [ ] other:

## 2019-06-19 NOTE — PROGRESS NOTE ADULT - SUBJECTIVE AND OBJECTIVE BOX
HPI:  39 year old male presents today for follow up, pt presents with a draining wound on his left forearm, pt was seen yesterday but did not want to stay, he did want to try oral antibiotics, he does admit to heroin use prior to his symptoms, he did have fever on Saturday and Sunday then swelling which worsened, he denies tenderness +drainage of pus today - denies local pain - OF note -## he does not want anybody especially his fiance to know about his drug use## (06 Jun 2019 12:25)      Allergies    No Known Allergies    Intolerances        MEDICATIONS  (STANDING):  amLODIPine   Tablet 10 milliGRAM(s) Oral daily  fluconAZOLE   Tablet 400 milliGRAM(s) Oral daily  lisinopril 40 milliGRAM(s) Oral daily  nicotine - 21 mG/24Hr(s) Patch 1 patch Transdermal daily  pantoprazole    Tablet 40 milliGRAM(s) Oral before breakfast  vancomycin  IVPB 750 milliGRAM(s) IV Intermittent every 8 hours  zolpidem 10 milliGRAM(s) Oral at bedtime    MEDICATIONS  (PRN):  acetaminophen   Tablet .. 650 milliGRAM(s) Oral every 6 hours PRN Mild Pain (1 - 3)  methadone    Tablet 5 milliGRAM(s) Oral two times a day PRN opiate cravings or severe pain      REVIEW OF SYSTEMS:    CONSTITUTIONAL: No fever, chills, weight loss, or fatigue  HEENT: No sore throat, runny nose, ear ache  RESPIRATORY: No cough, wheezing, No shortness of breath  CARDIOVASCULAR: No chest pain, palpitations, dizziness  GASTROINTESTINAL: No abdominal pain. No nausea, vomiting, diarrhea  GENITOURINARY: No dysuria, increase frequency, hematuria, or incontinence  NEUROLOGICAL: No headaches, memory loss, loss of strength, numbness, or tremors, no weakness  EXTREMITY: No pedal edema BLE  SKIN: No itching, burning, rashes, or lesions     VITAL SIGNS:  T(C): 36.4 (06-19-19 @ 05:02), Max: 36.6 (06-18-19 @ 11:51)  T(F): 97.5 (06-19-19 @ 05:02), Max: 97.9 (06-18-19 @ 18:04)  HR: 73 (06-19-19 @ 05:02) (73 - 122)  BP: 127/71 (06-19-19 @ 05:02) (122/77 - 132/80)  RR: 18 (06-19-19 @ 05:02) (18 - 18)  SpO2: 99% (06-19-19 @ 05:02) (97% - 99%)  Wt(kg): --    PHYSICAL EXAM:    GENERAL: not in any distress  HEENT: Neck is supple, normocephalic, atraumatic   CHEST/LUNG: Clear to auscultation bilaterally; No rales, rhonchi, wheezing  HEART: Regular rate and rhythm; No murmurs, rubs, or gallops  ABDOMEN: Soft, Nontender, Nondistended; Bowel sounds present, no rebound   EXTREMITIES:  2+ Peripheral Pulses, No clubbing, cyanosis, or edema  GENITOURINARY:   SKIN: No rashes or lesions  BACK: no pressor sore   NERVOUS SYSTEM:  Alert & Oriented X3, Good concentration  PSYCH: normal affect     LABS:                         10.1   6.69  )-----------( 143      ( 19 Jun 2019 06:46 )             29.5                           Sedimentation Rate, Erythrocyte: 46 mm/hr (06-17 @ 06:52)      Vancomycin Level, Trough: 17.9 ug/mL (06-16 @ 08:58)                      Radiology: HPI:  39 year old male presents today for follow up, pt presents with a draining wound on his left forearm, pt was seen yesterday but did not want to stay, he did want to try oral antibiotics, he does admit to heroin use prior to his symptoms, he did have fever on Saturday and Sunday then swelling which worsened, he denies tenderness +drainage of pus today - denies local pain - OF note -## he does not want anybody especially his fiance to know about his drug use## (06 Jun 2019 12:25)  had suppurative thrombophlebitis from methicillin resitant staph aureus cns and candida parapsiloses     Allergies    No Known Allergies    Intolerances        MEDICATIONS  (STANDING):  amLODIPine   Tablet 10 milliGRAM(s) Oral daily  fluconAZOLE   Tablet 400 milliGRAM(s) Oral daily  lisinopril 40 milliGRAM(s) Oral daily  nicotine - 21 mG/24Hr(s) Patch 1 patch Transdermal daily  pantoprazole    Tablet 40 milliGRAM(s) Oral before breakfast  vancomycin  IVPB 750 milliGRAM(s) IV Intermittent every 8 hours  zolpidem 10 milliGRAM(s) Oral at bedtime    MEDICATIONS  (PRN):  acetaminophen   Tablet .. 650 milliGRAM(s) Oral every 6 hours PRN Mild Pain (1 - 3)  methadone    Tablet 5 milliGRAM(s) Oral two times a day PRN opiate cravings or severe pain      REVIEW OF SYSTEMS:    CONSTITUTIONAL: No fever, chills, weight loss, or fatigue  HEENT: No sore throat, runny nose, ear ache  RESPIRATORY: No cough, wheezing, No shortness of breath  CARDIOVASCULAR: No chest pain, palpitations, dizziness  GASTROINTESTINAL: No abdominal pain. No nausea, vomiting, diarrhea  GENITOURINARY: No dysuria, increase frequency, hematuria, or incontinence  NEUROLOGICAL: No headaches, memory loss, loss of strength, numbness, or tremors, no weakness  EXTREMITY: No pedal edema BLE  SKIN: No itching, burning, rashes, or lesions     VITAL SIGNS:  T(C): 36.4 (06-19-19 @ 05:02), Max: 36.6 (06-18-19 @ 11:51)  T(F): 97.5 (06-19-19 @ 05:02), Max: 97.9 (06-18-19 @ 18:04)  HR: 73 (06-19-19 @ 05:02) (73 - 122)  BP: 127/71 (06-19-19 @ 05:02) (122/77 - 132/80)  RR: 18 (06-19-19 @ 05:02) (18 - 18)  SpO2: 99% (06-19-19 @ 05:02) (97% - 99%)  Wt(kg): --    PHYSICAL EXAM:    GENERAL: not in any distress  HEENT: Neck is supple, normocephalic, atraumatic   CHEST/LUNG: Clear to auscultation bilaterally; No rales, rhonchi, wheezing  HEART: Regular rate and rhythm; No murmurs, rubs, or gallops  ABDOMEN: Soft, Nontender, Nondistended; Bowel sounds present, no rebound   EXTREMITIES:  2+ Peripheral Pulses, No clubbing, cyanosis, or edema  healing arm wound  SKIN: No rashes or lesions  BACK: no pressor sore   NERVOUS SYSTEM:  Alert & Oriented X3, Good concentration  PSYCH: normal affect     LABS:                         10.1   6.69  )-----------( 143      ( 19 Jun 2019 06:46 )             29.5                           Sedimentation Rate, Erythrocyte: 46 mm/hr (06-17 @ 06:52)      Vancomycin Level, Trough: 17.9 ug/mL (06-16 @ 08:58)                      Radiology:

## 2019-06-20 LAB
ANION GAP SERPL CALC-SCNC: 6 MMOL/L — SIGNIFICANT CHANGE UP (ref 5–17)
BUN SERPL-MCNC: 34 MG/DL — HIGH (ref 7–23)
CALCIUM SERPL-MCNC: 9 MG/DL — SIGNIFICANT CHANGE UP (ref 8.5–10.1)
CHLORIDE SERPL-SCNC: 103 MMOL/L — SIGNIFICANT CHANGE UP (ref 96–108)
CO2 SERPL-SCNC: 30 MMOL/L — SIGNIFICANT CHANGE UP (ref 22–31)
CREAT SERPL-MCNC: 1.73 MG/DL — HIGH (ref 0.5–1.3)
GLUCOSE SERPL-MCNC: 117 MG/DL — HIGH (ref 70–99)
POTASSIUM SERPL-MCNC: 4 MMOL/L — SIGNIFICANT CHANGE UP (ref 3.5–5.3)
POTASSIUM SERPL-SCNC: 4 MMOL/L — SIGNIFICANT CHANGE UP (ref 3.5–5.3)
SODIUM SERPL-SCNC: 139 MMOL/L — SIGNIFICANT CHANGE UP (ref 135–145)
VANCOMYCIN FLD-MCNC: 26.3 UG/ML

## 2019-06-20 PROCEDURE — 99232 SBSQ HOSP IP/OBS MODERATE 35: CPT

## 2019-06-20 RX ADMIN — Medication 1 PATCH: at 07:30

## 2019-06-20 RX ADMIN — METHADONE HYDROCHLORIDE 5 MILLIGRAM(S): 40 TABLET ORAL at 09:21

## 2019-06-20 RX ADMIN — Medication 1 PATCH: at 11:08

## 2019-06-20 RX ADMIN — METHADONE HYDROCHLORIDE 5 MILLIGRAM(S): 40 TABLET ORAL at 21:41

## 2019-06-20 RX ADMIN — FLUCONAZOLE 400 MILLIGRAM(S): 150 TABLET ORAL at 11:11

## 2019-06-20 RX ADMIN — Medication 250 MILLIGRAM(S): at 07:31

## 2019-06-20 RX ADMIN — AMLODIPINE BESYLATE 10 MILLIGRAM(S): 2.5 TABLET ORAL at 05:45

## 2019-06-20 RX ADMIN — Medication 250 MILLIGRAM(S): at 00:13

## 2019-06-20 RX ADMIN — ZOLPIDEM TARTRATE 10 MILLIGRAM(S): 10 TABLET ORAL at 21:37

## 2019-06-20 RX ADMIN — LISINOPRIL 40 MILLIGRAM(S): 2.5 TABLET ORAL at 05:45

## 2019-06-20 RX ADMIN — PANTOPRAZOLE SODIUM 40 MILLIGRAM(S): 20 TABLET, DELAYED RELEASE ORAL at 06:12

## 2019-06-20 RX ADMIN — Medication 1 PATCH: at 11:11

## 2019-06-20 NOTE — PROGRESS NOTE ADULT - SUBJECTIVE AND OBJECTIVE BOX
Post Op Day#:13     Procedure:  I and D abscess left forearm    Vital Signs Last 24 Hrs  T(C): 36.7 (20 Jun 2019 17:25), Max: 36.7 (20 Jun 2019 17:25)  T(F): 98.1 (20 Jun 2019 17:25), Max: 98.1 (20 Jun 2019 17:25)  HR: 89 (20 Jun 2019 17:25) (68 - 89)  BP: 130/89 (20 Jun 2019 17:25) (113/61 - 130/89)  BP(mean): --  RR: 18 (20 Jun 2019 17:25) (18 - 18)  SpO2: 100% (20 Jun 2019 17:25) (96% - 100%)    I&O's Detail    19 Jun 2019 07:01  -  20 Jun 2019 07:00  --------------------------------------------------------  IN:    Oral Fluid: 840 mL    Solution: 500 mL  Total IN: 1340 mL    OUT:    Voided: 360 mL  Total OUT: 360 mL    Total NET: 980 mL      20 Jun 2019 07:01  -  20 Jun 2019 19:00  --------------------------------------------------------  IN:    Oral Fluid: 840 mL  Total IN: 840 mL    OUT:  Total OUT: 0 mL    Total NET: 840 mL                                10.1   6.69  )-----------( 143      ( 19 Jun 2019 06:46 )             29.5       06-20    139  |  103  |  34<H>  ----------------------------<  117<H>  4.0   |  30  |  1.73<H>    Ca    9.0      20 Jun 2019 15:24        INR:     Radiology:    Physical Exam:    General:  Appears stated age, well-groomed, well-nourished, no distress     Extremities:  granulating 3.5 cm  x 1.3 cm x .3 cm wound left forearm with small amount fibrin slough

## 2019-06-20 NOTE — PROGRESS NOTE ADULT - SUBJECTIVE AND OBJECTIVE BOX
39 year old male presents today for follow up, pt presents with a draining wound on his left forearm, pt was seen yesterday but did not want to stay, he did want to try oral antibiotics, he does admit to heroin use prior to his symptoms, he did have fever on Saturday and  then swelling which worsened, he denies tenderness +drainage of pus today - denies local pain - OF note -## he does not want anybody especially his fiance to know about his drug use## (2019 12:25)      Chief Complaint:  Patient is a 39y old  Male who presents with a chief complaint of cellultis (10 David 2019 19:57)      Review of Systems:    General:  No wt loss, fevers, chills, night sweats  Eyes:  Good vision, no reported pain  ENT:  No sore throat, pain, runny nose, dysphagia  CV:  No pain, palpitations, hypo/hypertension  Resp:  No dyspnea, cough, tachypnea, wheezing  GI:  No pain, nausea, vomiting, diarrhea, constipation  :  No pain, bleeding, incontinence, nocturia           Social History/Family History  SOCHX:   tobacco,  -  alcohol    FMHX: FA/MO  - contributory       Discussed with:  PMD, Family    Physical Exam:    Vital Signs:  Vital Signs Last 24 Hrs  T(C): 36.2 (2019 05:44), Max: 36.5 (10 David 2019 11:24)  T(F): 97.2 (2019 05:44), Max: 97.7 (10 David 2019 11:24)  HR: 66 (2019 05:44) (66 - 100)  BP: 155/98 (2019 05:44) (152/83 - 181/97)  BP(mean): --  RR: 18 (2019 05:44) (17 - 18)  SpO2: 98% (2019 05:44) (98% - 100%)  Daily     Daily Weight in k.4 (2019 05:44)  I&O's Summary    2019 07:01  -  10 David 2019 07:00  --------------------------------------------------------  IN: 2030 mL / OUT: 0 mL / NET: 2030 mL          Chest:  Full & symmetric excursion, no increased effort, breath sounds clear  Cardiovascular:  Regular rhythm, S1, S2, no murmur/rub/S3/S4,  Abdomen:  Soft, non-tender, non-distended, normoactive bowel sounds, no HSM        Assessment:  Bacteremia  TTE noted  ALEJANDRO no vegetation  Patient requires long term ABX IV  Psych noted

## 2019-06-20 NOTE — PROGRESS NOTE ADULT - SUBJECTIVE AND OBJECTIVE BOX
INTERVAL HPI/OVERNIGHT EVENTS:       No events over night.    MEDICATIONS  (STANDING):  amLODIPine   Tablet 10 milliGRAM(s) Oral daily  fluconAZOLE   Tablet 400 milliGRAM(s) Oral daily  lisinopril 40 milliGRAM(s) Oral daily  nicotine - 21 mG/24Hr(s) Patch 1 patch Transdermal daily  pantoprazole    Tablet 40 milliGRAM(s) Oral before breakfast  vancomycin  IVPB 750 milliGRAM(s) IV Intermittent every 8 hours  zolpidem 10 milliGRAM(s) Oral at bedtime    MEDICATIONS  (PRN):  acetaminophen   Tablet .. 650 milliGRAM(s) Oral every 6 hours PRN Mild Pain (1 - 3)  methadone    Tablet 5 milliGRAM(s) Oral two times a day PRN opiate cravings or severe pain        Vital Signs Last 24 Hrs  T(C): 36.6 (20 Jun 2019 11:04), Max: 36.6 (19 Jun 2019 18:47)  T(F): 97.9 (20 Jun 2019 11:04), Max: 97.9 (20 Jun 2019 11:04)  HR: 83 (20 Jun 2019 11:04) (67 - 83)  BP: 125/80 (20 Jun 2019 11:04) (106/61 - 130/70)  BP(mean): --  RR: 18 (20 Jun 2019 11:04) (18 - 18)  SpO2: 99% (20 Jun 2019 11:04) (96% - 99%)    GENERAL: NAD, well-groomed, well-developed, awake, alert, oriented x 3, fluent and coherent speech  HEAD:  Atraumatic, Normocephalic  EYES: EOMI, conjunctiva and sclera clear  NECK: Supple  NERVOUS SYSTEM:  Good concentration; Moving all 4 extremities; No gross sensory deficits, No facial droop  CHEST WALL: No masses  CHEST/LUNG: Clear to auscultation bilaterally; No rales, rhonchi, wheezing, or rubs  HEART: Regular rate and rhythm; No murmurs, rubs, or gallops  ABDOMEN: Soft, Nontender, Nondistended, Bowel sounds present, No palpable masses or organomegaly, No bruits  EXTREMITIES:  left arm forearm s/p I&D.                                10.1   6.69  )-----------( 143      ( 19 Jun 2019 06:46 )             29.5

## 2019-06-21 DIAGNOSIS — N17.0 ACUTE KIDNEY FAILURE WITH TUBULAR NECROSIS: ICD-10-CM

## 2019-06-21 LAB
ANION GAP SERPL CALC-SCNC: 7 MMOL/L — SIGNIFICANT CHANGE UP (ref 5–17)
BUN SERPL-MCNC: 31 MG/DL — HIGH (ref 7–23)
CALCIUM SERPL-MCNC: 9 MG/DL — SIGNIFICANT CHANGE UP (ref 8.5–10.1)
CHLORIDE SERPL-SCNC: 105 MMOL/L — SIGNIFICANT CHANGE UP (ref 96–108)
CO2 SERPL-SCNC: 30 MMOL/L — SIGNIFICANT CHANGE UP (ref 22–31)
CREAT SERPL-MCNC: 1.36 MG/DL — HIGH (ref 0.5–1.3)
GLUCOSE SERPL-MCNC: 92 MG/DL — SIGNIFICANT CHANGE UP (ref 70–99)
HCT VFR BLD CALC: 28.8 % — LOW (ref 39–50)
HGB BLD-MCNC: 10 G/DL — LOW (ref 13–17)
MCHC RBC-ENTMCNC: 26.4 PG — LOW (ref 27–34)
MCHC RBC-ENTMCNC: 34.7 GM/DL — SIGNIFICANT CHANGE UP (ref 32–36)
MCV RBC AUTO: 76 FL — LOW (ref 80–100)
NRBC # BLD: 0 /100 WBCS — SIGNIFICANT CHANGE UP (ref 0–0)
PLATELET # BLD AUTO: 154 K/UL — SIGNIFICANT CHANGE UP (ref 150–400)
POTASSIUM SERPL-MCNC: 4 MMOL/L — SIGNIFICANT CHANGE UP (ref 3.5–5.3)
POTASSIUM SERPL-SCNC: 4 MMOL/L — SIGNIFICANT CHANGE UP (ref 3.5–5.3)
RBC # BLD: 3.79 M/UL — LOW (ref 4.2–5.8)
RBC # FLD: 13.3 % — SIGNIFICANT CHANGE UP (ref 10.3–14.5)
SODIUM SERPL-SCNC: 142 MMOL/L — SIGNIFICANT CHANGE UP (ref 135–145)
VANCOMYCIN TROUGH SERPL-MCNC: 11 UG/ML — SIGNIFICANT CHANGE UP (ref 10–20)
WBC # BLD: 6.1 K/UL — SIGNIFICANT CHANGE UP (ref 3.8–10.5)
WBC # FLD AUTO: 6.1 K/UL — SIGNIFICANT CHANGE UP (ref 3.8–10.5)

## 2019-06-21 PROCEDURE — 99233 SBSQ HOSP IP/OBS HIGH 50: CPT

## 2019-06-21 RX ORDER — SODIUM CHLORIDE 9 MG/ML
1000 INJECTION, SOLUTION INTRAVENOUS
Refills: 0 | Status: DISCONTINUED | OUTPATIENT
Start: 2019-06-21 | End: 2019-06-25

## 2019-06-21 RX ADMIN — Medication 1 PATCH: at 06:04

## 2019-06-21 RX ADMIN — Medication 250 MILLIGRAM(S): at 09:09

## 2019-06-21 RX ADMIN — Medication 1 PATCH: at 12:32

## 2019-06-21 RX ADMIN — METHADONE HYDROCHLORIDE 5 MILLIGRAM(S): 40 TABLET ORAL at 10:06

## 2019-06-21 RX ADMIN — AMLODIPINE BESYLATE 10 MILLIGRAM(S): 2.5 TABLET ORAL at 06:01

## 2019-06-21 RX ADMIN — SODIUM CHLORIDE 100 MILLILITER(S): 9 INJECTION, SOLUTION INTRAVENOUS at 13:40

## 2019-06-21 RX ADMIN — ZOLPIDEM TARTRATE 10 MILLIGRAM(S): 10 TABLET ORAL at 21:27

## 2019-06-21 RX ADMIN — FLUCONAZOLE 400 MILLIGRAM(S): 150 TABLET ORAL at 12:51

## 2019-06-21 RX ADMIN — PANTOPRAZOLE SODIUM 40 MILLIGRAM(S): 20 TABLET, DELAYED RELEASE ORAL at 06:01

## 2019-06-21 RX ADMIN — Medication 1 PATCH: at 12:50

## 2019-06-21 RX ADMIN — LISINOPRIL 40 MILLIGRAM(S): 2.5 TABLET ORAL at 06:01

## 2019-06-21 RX ADMIN — Medication 1 PATCH: at 07:30

## 2019-06-21 NOTE — PROGRESS NOTE ADULT - SUBJECTIVE AND OBJECTIVE BOX
INTERVAL HPI/OVERNIGHT EVENTS:       No events over night.    MEDICATIONS  (STANDING):  amLODIPine   Tablet 10 milliGRAM(s) Oral daily  dextrose 5% + sodium chloride 0.45%. 1000 milliLiter(s) (100 mL/Hr) IV Continuous <Continuous>  fluconAZOLE   Tablet 400 milliGRAM(s) Oral daily  lisinopril 40 milliGRAM(s) Oral daily  nicotine - 21 mG/24Hr(s) Patch 1 patch Transdermal daily  pantoprazole    Tablet 40 milliGRAM(s) Oral before breakfast  zolpidem 10 milliGRAM(s) Oral at bedtime    MEDICATIONS  (PRN):  acetaminophen   Tablet .. 650 milliGRAM(s) Oral every 6 hours PRN Mild Pain (1 - 3)  methadone    Tablet 5 milliGRAM(s) Oral two times a day PRN opiate cravings or severe pain          Vital Signs Last 24 Hrs  T(C): 36.8 (21 Jun 2019 12:04), Max: 36.8 (21 Jun 2019 12:04)  T(F): 98.3 (21 Jun 2019 12:04), Max: 98.3 (21 Jun 2019 12:04)  HR: 78 (21 Jun 2019 12:04) (65 - 158)  BP: 99/60 (21 Jun 2019 12:04) (99/60 - 158/19)  BP(mean): --  RR: 19 (21 Jun 2019 12:04) (18 - 19)  SpO2: 96% (21 Jun 2019 12:04) (96% - 100%)    GENERAL: NAD, well-groomed, well-developed, awake, alert, oriented x 3, fluent and coherent speech  HEAD:  Atraumatic, Normocephalic  EYES: EOMI, conjunctiva and sclera clear  NECK: Supple  NERVOUS SYSTEM:  Good concentration; Moving all 4 extremities; No gross sensory deficits, No facial droop  CHEST WALL: No masses  CHEST/LUNG: Clear to auscultation bilaterally; No rales, rhonchi, wheezing, or rubs  HEART: Regular rate and rhythm; No murmurs, rubs, or gallops  ABDOMEN: Soft, Nontender, Nondistended, Bowel sounds present, No palpable masses or organomegaly, No bruits  EXTREMITIES:  left arm forearm s/p I&D.                                10.1   6.69  )-----------( 143      ( 19 Jun 2019 06:46 )             29.5

## 2019-06-21 NOTE — PROGRESS NOTE ADULT - PROBLEM SELECTOR PLAN 1
s/p I and D by plastics. healing well   Blood cultures positive for Candida and MRSA, continue with Vanc and fluconazole   s/p ALEJANDRO  and no evidence of vegetation

## 2019-06-21 NOTE — PROGRESS NOTE ADULT - ASSESSMENT
intravenous drug addict suppurative thrombo phlebitis   cns candida   micro final blood culture is cns   as per micro pcr was methicillin resitant staph aureus though grew cns   ALEJANDRO NEGATIVE   plan to finish 3 weeks iv vanco from admission   diflucan 400 daily   end date  for both is 6/28/2019  now acute renal failure   vanco held level today 11.3  serum cr is better vs yesterday   will repeat vanco level am bmp am resume vanco am at a lower dose  will see his addiction MD by 6/29 /2019

## 2019-06-22 LAB
ANION GAP SERPL CALC-SCNC: 7 MMOL/L — SIGNIFICANT CHANGE UP (ref 5–17)
BUN SERPL-MCNC: 29 MG/DL — HIGH (ref 7–23)
CALCIUM SERPL-MCNC: 8.8 MG/DL — SIGNIFICANT CHANGE UP (ref 8.5–10.1)
CHLORIDE SERPL-SCNC: 106 MMOL/L — SIGNIFICANT CHANGE UP (ref 96–108)
CO2 SERPL-SCNC: 28 MMOL/L — SIGNIFICANT CHANGE UP (ref 22–31)
CREAT SERPL-MCNC: 1.37 MG/DL — HIGH (ref 0.5–1.3)
GLUCOSE SERPL-MCNC: 97 MG/DL — SIGNIFICANT CHANGE UP (ref 70–99)
POTASSIUM SERPL-MCNC: 4.2 MMOL/L — SIGNIFICANT CHANGE UP (ref 3.5–5.3)
POTASSIUM SERPL-SCNC: 4.2 MMOL/L — SIGNIFICANT CHANGE UP (ref 3.5–5.3)
SODIUM SERPL-SCNC: 141 MMOL/L — SIGNIFICANT CHANGE UP (ref 135–145)
VANCOMYCIN FLD-MCNC: 7 UG/ML — SIGNIFICANT CHANGE UP

## 2019-06-22 PROCEDURE — 99233 SBSQ HOSP IP/OBS HIGH 50: CPT

## 2019-06-22 RX ADMIN — AMLODIPINE BESYLATE 10 MILLIGRAM(S): 2.5 TABLET ORAL at 05:24

## 2019-06-22 RX ADMIN — Medication 1 PATCH: at 11:50

## 2019-06-22 RX ADMIN — METHADONE HYDROCHLORIDE 5 MILLIGRAM(S): 40 TABLET ORAL at 08:43

## 2019-06-22 RX ADMIN — ZOLPIDEM TARTRATE 10 MILLIGRAM(S): 10 TABLET ORAL at 22:14

## 2019-06-22 RX ADMIN — Medication 1 PATCH: at 08:40

## 2019-06-22 RX ADMIN — Medication 1 PATCH: at 21:10

## 2019-06-22 RX ADMIN — PANTOPRAZOLE SODIUM 40 MILLIGRAM(S): 20 TABLET, DELAYED RELEASE ORAL at 05:24

## 2019-06-22 RX ADMIN — FLUCONAZOLE 400 MILLIGRAM(S): 150 TABLET ORAL at 11:50

## 2019-06-22 RX ADMIN — METHADONE HYDROCHLORIDE 5 MILLIGRAM(S): 40 TABLET ORAL at 22:14

## 2019-06-22 RX ADMIN — SODIUM CHLORIDE 100 MILLILITER(S): 9 INJECTION, SOLUTION INTRAVENOUS at 05:25

## 2019-06-22 RX ADMIN — SODIUM CHLORIDE 100 MILLILITER(S): 9 INJECTION, SOLUTION INTRAVENOUS at 18:05

## 2019-06-22 RX ADMIN — Medication 1 PATCH: at 11:51

## 2019-06-22 NOTE — PROGRESS NOTE ADULT - PROBLEM SELECTOR PLAN 1
s/p I and D by plastics. healing well   Blood cultures positive for Candida and MRSA,   s/p ALEJANDRO  and no evidence of vegetation  vanco on hold d.t maggie. Dr. Hernandez to manage

## 2019-06-22 NOTE — PROGRESS NOTE ADULT - ATTENDING COMMENTS
will likely have a difficult time for placement d./t the iv drug use and need for I abx.
will likely have a difficult time for placement d./t the iv drug use and need for I abx. will discuss with yancy
will likely have a difficult time for placement d./t the iv drug use and need for I abx.

## 2019-06-23 LAB
ANION GAP SERPL CALC-SCNC: 8 MMOL/L — SIGNIFICANT CHANGE UP (ref 5–17)
BUN SERPL-MCNC: 26 MG/DL — HIGH (ref 7–23)
CALCIUM SERPL-MCNC: 9 MG/DL — SIGNIFICANT CHANGE UP (ref 8.5–10.1)
CHLORIDE SERPL-SCNC: 107 MMOL/L — SIGNIFICANT CHANGE UP (ref 96–108)
CO2 SERPL-SCNC: 25 MMOL/L — SIGNIFICANT CHANGE UP (ref 22–31)
CREAT SERPL-MCNC: 1.22 MG/DL — SIGNIFICANT CHANGE UP (ref 0.5–1.3)
GLUCOSE SERPL-MCNC: 82 MG/DL — SIGNIFICANT CHANGE UP (ref 70–99)
POTASSIUM SERPL-MCNC: 4.5 MMOL/L — SIGNIFICANT CHANGE UP (ref 3.5–5.3)
POTASSIUM SERPL-SCNC: 4.5 MMOL/L — SIGNIFICANT CHANGE UP (ref 3.5–5.3)
SODIUM SERPL-SCNC: 140 MMOL/L — SIGNIFICANT CHANGE UP (ref 135–145)

## 2019-06-23 PROCEDURE — 99233 SBSQ HOSP IP/OBS HIGH 50: CPT

## 2019-06-23 RX ORDER — METHADONE HYDROCHLORIDE 40 MG/1
5 TABLET ORAL DAILY
Refills: 0 | Status: DISCONTINUED | OUTPATIENT
Start: 2019-06-23 | End: 2019-06-25

## 2019-06-23 RX ADMIN — SODIUM CHLORIDE 100 MILLILITER(S): 9 INJECTION, SOLUTION INTRAVENOUS at 16:59

## 2019-06-23 RX ADMIN — Medication 1 PATCH: at 12:04

## 2019-06-23 RX ADMIN — Medication 1 PATCH: at 07:31

## 2019-06-23 RX ADMIN — AMLODIPINE BESYLATE 10 MILLIGRAM(S): 2.5 TABLET ORAL at 06:15

## 2019-06-23 RX ADMIN — METHADONE HYDROCHLORIDE 5 MILLIGRAM(S): 40 TABLET ORAL at 12:03

## 2019-06-23 RX ADMIN — ZOLPIDEM TARTRATE 10 MILLIGRAM(S): 10 TABLET ORAL at 21:58

## 2019-06-23 RX ADMIN — PANTOPRAZOLE SODIUM 40 MILLIGRAM(S): 20 TABLET, DELAYED RELEASE ORAL at 07:31

## 2019-06-23 RX ADMIN — FLUCONAZOLE 400 MILLIGRAM(S): 150 TABLET ORAL at 12:04

## 2019-06-23 RX ADMIN — Medication 1 PATCH: at 19:53

## 2019-06-23 NOTE — PROGRESS NOTE ADULT - PROBLEM SELECTOR PLAN 1
s/p I and D by plastics. healing well   Blood cultures positive for Candida and MRSA,   s/p ALEJANDRO  and no evidence of vegetation  vanco on hold d.t maggie. Dr. Hernandez to manage. will f.u about restarting abx

## 2019-06-23 NOTE — PROGRESS NOTE ADULT - SUBJECTIVE AND OBJECTIVE BOX
INTERVAL HPI/OVERNIGHT EVENTS:       none    MEDICATIONS  (STANDING):  amLODIPine   Tablet 10 milliGRAM(s) Oral daily  dextrose 5% + sodium chloride 0.45%. 1000 milliLiter(s) (100 mL/Hr) IV Continuous <Continuous>  fluconAZOLE   Tablet 400 milliGRAM(s) Oral daily  methadone    Tablet 5 milliGRAM(s) Oral daily  nicotine - 21 mG/24Hr(s) Patch 1 patch Transdermal daily  pantoprazole    Tablet 40 milliGRAM(s) Oral before breakfast  zolpidem 10 milliGRAM(s) Oral at bedtime    MEDICATIONS  (PRN):  acetaminophen   Tablet .. 650 milliGRAM(s) Oral every 6 hours PRN Mild Pain (1 - 3)          Vital Signs Last 24 Hrs  T(C): 36.7 (22 Jun 2019 11:46), Max: 37.1 (21 Jun 2019 17:36)  T(F): 98.1 (22 Jun 2019 11:46), Max: 98.7 (21 Jun 2019 17:36)  HR: 95 (22 Jun 2019 11:46) (67 - 120)  BP: 119/76 (22 Jun 2019 11:46) (118/78 - 129/77)  BP(mean): --  RR: 18 (22 Jun 2019 11:46) (18 - 19)  SpO2: 98% (22 Jun 2019 11:46) (96% - 98%)    GENERAL: NAD, well-groomed, well-developed, awake, alert, oriented x 3, fluent and coherent speech  HEAD:  Atraumatic, Normocephalic  EYES: EOMI, conjunctiva and sclera clear  NECK: Supple  NERVOUS SYSTEM:  Good concentration; Moving all 4 extremities; No gross sensory deficits, No facial droop  CHEST WALL: No masses  CHEST/LUNG: Clear to auscultation bilaterally; No rales, rhonchi, wheezing, or rubs  HEART: Regular rate and rhythm; No murmurs, rubs, or gallops  ABDOMEN: Soft, Nontender, Nondistended, Bowel sounds present, No palpable masses or organomegaly, No bruits  EXTREMITIES:  left arm forearm s/p I&D.                       06-23    140  |  107  |  26<H>  ----------------------------<  82  4.5   |  25  |  1.22    Ca    9.0      23 Jun 2019 07:41

## 2019-06-24 PROCEDURE — 99232 SBSQ HOSP IP/OBS MODERATE 35: CPT

## 2019-06-24 RX ORDER — CHLORHEXIDINE GLUCONATE 213 G/1000ML
1 SOLUTION TOPICAL
Refills: 0 | Status: DISCONTINUED | OUTPATIENT
Start: 2019-06-24 | End: 2019-07-01

## 2019-06-24 RX ORDER — VANCOMYCIN HCL 1 G
1000 VIAL (EA) INTRAVENOUS EVERY 12 HOURS
Refills: 0 | Status: DISCONTINUED | OUTPATIENT
Start: 2019-06-24 | End: 2019-06-27

## 2019-06-24 RX ADMIN — Medication 250 MILLIGRAM(S): at 18:35

## 2019-06-24 RX ADMIN — Medication 1 PATCH: at 11:14

## 2019-06-24 RX ADMIN — CHLORHEXIDINE GLUCONATE 1 APPLICATION(S): 213 SOLUTION TOPICAL at 12:31

## 2019-06-24 RX ADMIN — PANTOPRAZOLE SODIUM 40 MILLIGRAM(S): 20 TABLET, DELAYED RELEASE ORAL at 08:09

## 2019-06-24 RX ADMIN — Medication 250 MILLIGRAM(S): at 08:09

## 2019-06-24 RX ADMIN — Medication 1 PATCH: at 19:14

## 2019-06-24 RX ADMIN — METHADONE HYDROCHLORIDE 5 MILLIGRAM(S): 40 TABLET ORAL at 11:08

## 2019-06-24 RX ADMIN — SODIUM CHLORIDE 100 MILLILITER(S): 9 INJECTION, SOLUTION INTRAVENOUS at 02:42

## 2019-06-24 RX ADMIN — Medication 1 PATCH: at 11:09

## 2019-06-24 RX ADMIN — SODIUM CHLORIDE 100 MILLILITER(S): 9 INJECTION, SOLUTION INTRAVENOUS at 12:34

## 2019-06-24 RX ADMIN — FLUCONAZOLE 400 MILLIGRAM(S): 150 TABLET ORAL at 12:28

## 2019-06-24 RX ADMIN — ZOLPIDEM TARTRATE 10 MILLIGRAM(S): 10 TABLET ORAL at 22:13

## 2019-06-24 RX ADMIN — AMLODIPINE BESYLATE 10 MILLIGRAM(S): 2.5 TABLET ORAL at 05:42

## 2019-06-24 NOTE — PROGRESS NOTE ADULT - SUBJECTIVE AND OBJECTIVE BOX
INTERVAL HPI/OVERNIGHT EVENTS:       MEDICATIONS  (STANDING):  amLODIPine   Tablet 10 milliGRAM(s) Oral daily  chlorhexidine 4% Liquid 1 Application(s) Topical <User Schedule>  dextrose 5% + sodium chloride 0.45%. 1000 milliLiter(s) (100 mL/Hr) IV Continuous <Continuous>  fluconAZOLE   Tablet 400 milliGRAM(s) Oral daily  methadone    Tablet 5 milliGRAM(s) Oral daily  nicotine - 21 mG/24Hr(s) Patch 1 patch Transdermal daily  pantoprazole    Tablet 40 milliGRAM(s) Oral before breakfast  vancomycin  IVPB 1000 milliGRAM(s) IV Intermittent every 12 hours  zolpidem 10 milliGRAM(s) Oral at bedtime    MEDICATIONS  (PRN):  acetaminophen   Tablet .. 650 milliGRAM(s) Oral every 6 hours PRN Mild Pain (1 - 3)    Vital Signs Last 24 Hrs  T(C): 36.9 (24 Jun 2019 11:11), Max: 36.9 (24 Jun 2019 11:11)  T(F): 98.4 (24 Jun 2019 11:11), Max: 98.4 (24 Jun 2019 11:11)  HR: 83 (24 Jun 2019 11:11) (73 - 97)  BP: 120/82 (24 Jun 2019 11:11) (110/81 - 145/85)  BP(mean): --  RR: 17 (24 Jun 2019 11:11) (17 - 18)  SpO2: 97% (24 Jun 2019 11:11) (97% - 100%)    GENERAL: NAD, well-groomed, well-developed, awake, alert, oriented x 3, fluent and coherent speech  HEAD:  Atraumatic, Normocephalic  EYES: EOMI, conjunctiva and sclera clear  NECK: Supple  NERVOUS SYSTEM:  Good concentration; Moving all 4 extremities; No gross sensory deficits, No facial droop  CHEST WALL: No masses  CHEST/LUNG: Clear to auscultation bilaterally; No rales, rhonchi, wheezing, or rubs  HEART: Regular rate and rhythm; No murmurs, rubs, or gallops  ABDOMEN: Soft, Nontender, Nondistended, Bowel sounds present, No palpable masses or organomegaly, No bruits  EXTREMITIES:  left arm forearm s/p I&D.

## 2019-06-24 NOTE — PROGRESS NOTE ADULT - ASSESSMENT
intravenous drug addict suppurative thrombo phlebitis   cns candida   micro final blood culture is cns   as per micro pcr was methicillin resitant staph aureus though grew cns   ALEJANDRO NEGATIVE   plan to finish 3 weeks iv vanco from admission   diflucan 400 daily   end date  for both is 6/28/2019

## 2019-06-24 NOTE — PROGRESS NOTE ADULT - SUBJECTIVE AND OBJECTIVE BOX
HPI:  39 year old male presents today for follow up, pt presents with a draining wound on his left forearm, pt was seen yesterday but did not want to stay, he did want to try oral antibiotics, he does admit to heroin use prior to his symptoms, he did have fever on Saturday and Sunday then swelling which worsened, he denies tenderness +drainage of pus today - denies local pain - OF note -## he does not want anybody especially his fiance to know about his drug use## (06 Jun 2019 12:25)  here suppurative thrombophlebitis from candida and methicillin resitant staph aureus and cns   ALEJANDRO NEGATIVE     Allergies    No Known Allergies    Intolerances        MEDICATIONS  (STANDING):  amLODIPine   Tablet 10 milliGRAM(s) Oral daily  dextrose 5% + sodium chloride 0.45%. 1000 milliLiter(s) (100 mL/Hr) IV Continuous <Continuous>  fluconAZOLE   Tablet 400 milliGRAM(s) Oral daily  methadone    Tablet 5 milliGRAM(s) Oral daily  nicotine - 21 mG/24Hr(s) Patch 1 patch Transdermal daily  pantoprazole    Tablet 40 milliGRAM(s) Oral before breakfast  zolpidem 10 milliGRAM(s) Oral at bedtime    MEDICATIONS  (PRN):  acetaminophen   Tablet .. 650 milliGRAM(s) Oral every 6 hours PRN Mild Pain (1 - 3)      REVIEW OF SYSTEMS:    waiting for discharge   fine     VITAL SIGNS:  T(C): 35.8 (06-24-19 @ 05:30), Max: 36.5 (06-23-19 @ 12:12)  T(F): 96.4 (06-24-19 @ 05:30), Max: 97.7 (06-23-19 @ 12:12)  HR: 73 (06-24-19 @ 05:30) (73 - 97)  BP: 145/85 (06-24-19 @ 05:30) (110/81 - 145/85)  RR: 18 (06-24-19 @ 05:30) (18 - 18)  SpO2: 100% (06-24-19 @ 05:30) (97% - 100%)  Wt(kg): --    PHYSICAL EXAM:    GENERAL: not in any distress  HEENT: Neck is supple, normocephalic, atraumatic   CHEST/LUNG: Clear to auscultation bilaterally; No rales, rhonchi, wheezing  HEART: Regular rate and rhythm; No murmurs, rubs, or gallops  ABDOMEN: Soft, Nontender, Nondistended; Bowel sounds present, no rebound   EXTREMITIES:  2+ Peripheral Pulses, No clubbing, cyanosis, or edema  resolved arm infection  SKIN: No rashes or lesions  BACK: no pressor sore   NERVOUS SYSTEM:  Alert & Oriented X3, Good concentration  PSYCH: normal affect     LABS:     06-23    140  |  107  |  26<H>  ----------------------------<  82  4.5   |  25  |  1.22    Ca    9.0      23 Jun 2019 07:41                                              Radiology:

## 2019-06-25 LAB
ANION GAP SERPL CALC-SCNC: 9 MMOL/L — SIGNIFICANT CHANGE UP (ref 5–17)
BUN SERPL-MCNC: 27 MG/DL — HIGH (ref 7–23)
CALCIUM SERPL-MCNC: 9.1 MG/DL — SIGNIFICANT CHANGE UP (ref 8.5–10.1)
CHLORIDE SERPL-SCNC: 107 MMOL/L — SIGNIFICANT CHANGE UP (ref 96–108)
CO2 SERPL-SCNC: 24 MMOL/L — SIGNIFICANT CHANGE UP (ref 22–31)
CREAT SERPL-MCNC: 1.29 MG/DL — SIGNIFICANT CHANGE UP (ref 0.5–1.3)
GLUCOSE SERPL-MCNC: 118 MG/DL — HIGH (ref 70–99)
POTASSIUM SERPL-MCNC: 3.8 MMOL/L — SIGNIFICANT CHANGE UP (ref 3.5–5.3)
POTASSIUM SERPL-SCNC: 3.8 MMOL/L — SIGNIFICANT CHANGE UP (ref 3.5–5.3)
SODIUM SERPL-SCNC: 140 MMOL/L — SIGNIFICANT CHANGE UP (ref 135–145)
VANCOMYCIN TROUGH SERPL-MCNC: 12.6 UG/ML — SIGNIFICANT CHANGE UP (ref 10–20)

## 2019-06-25 PROCEDURE — 99233 SBSQ HOSP IP/OBS HIGH 50: CPT

## 2019-06-25 RX ORDER — METHADONE HYDROCHLORIDE 40 MG/1
5 TABLET ORAL DAILY
Refills: 0 | Status: DISCONTINUED | OUTPATIENT
Start: 2019-06-25 | End: 2019-07-01

## 2019-06-25 RX ORDER — ZOLPIDEM TARTRATE 10 MG/1
10 TABLET ORAL AT BEDTIME
Refills: 0 | Status: DISCONTINUED | OUTPATIENT
Start: 2019-06-25 | End: 2019-07-01

## 2019-06-25 RX ORDER — METHADONE HYDROCHLORIDE 40 MG/1
10 TABLET ORAL DAILY
Refills: 0 | Status: DISCONTINUED | OUTPATIENT
Start: 2019-06-26 | End: 2019-07-01

## 2019-06-25 RX ADMIN — ZOLPIDEM TARTRATE 10 MILLIGRAM(S): 10 TABLET ORAL at 22:19

## 2019-06-25 RX ADMIN — SODIUM CHLORIDE 100 MILLILITER(S): 9 INJECTION, SOLUTION INTRAVENOUS at 05:24

## 2019-06-25 RX ADMIN — CHLORHEXIDINE GLUCONATE 1 APPLICATION(S): 213 SOLUTION TOPICAL at 05:21

## 2019-06-25 RX ADMIN — Medication 1 PATCH: at 11:33

## 2019-06-25 RX ADMIN — PANTOPRAZOLE SODIUM 40 MILLIGRAM(S): 20 TABLET, DELAYED RELEASE ORAL at 07:58

## 2019-06-25 RX ADMIN — METHADONE HYDROCHLORIDE 5 MILLIGRAM(S): 40 TABLET ORAL at 09:56

## 2019-06-25 RX ADMIN — Medication 250 MILLIGRAM(S): at 17:45

## 2019-06-25 RX ADMIN — Medication 1 PATCH: at 07:57

## 2019-06-25 RX ADMIN — Medication 1 PATCH: at 11:32

## 2019-06-25 RX ADMIN — FLUCONAZOLE 400 MILLIGRAM(S): 150 TABLET ORAL at 11:33

## 2019-06-25 RX ADMIN — Medication 1 PATCH: at 19:40

## 2019-06-25 RX ADMIN — Medication 250 MILLIGRAM(S): at 05:19

## 2019-06-25 NOTE — PROGRESS NOTE BEHAVIORAL HEALTH - NSBHFUPINTERVALHXFT_PSY_A_CORE
Patient, SW and Writer had a sit down discussion regarding discharge planning and after care. Patient requested that he be referred to a Methadone Program and he is opting not to return to Suboxone treatment. He feels like he has been on it "for a while" and also has used methadone before and is planning on completing a 6 month program and then coming off of it. Patient reported concern that he would go into withdrawal/cravings during his stay if he was weaned off of the methadone. Risks/benefits/alternatives discussed with mutual decision to maintain Patient at Methadone 10mg PO qd at this time (based on his calculated PRN usage and exhibited sxs which is no evidence of withdrawal at this time) with 5mg PO qd prn for any breakthrough symptoms. Patient, SW and Writer had a sit down discussion regarding discharge planning and after care. Patient requested that he be referred to a Methadone Program and he is opting not to return to Suboxone treatment. He feels like he has been on it "for a while" and also has used methadone before and is planning on completing a 6 month program and then coming off of it. Patient reported concern (described as "anxiety") that he would go into withdrawal/cravings during his stay if he was weaned off of the methadone. Risks/benefits/alternatives discussed with mutual decision to maintain Patient at Methadone 10mg PO qd at this time (based on his calculated PRN usage and exhibited sxs which is no evidence of withdrawal at this time) with 5mg PO qd prn for any breakthrough symptoms.

## 2019-06-25 NOTE — PROGRESS NOTE ADULT - SUBJECTIVE AND OBJECTIVE BOX
Patient seen and examined at bedside and chart was reviewed, No acute overnight events. Patient is pleasant, comfortable and cooperative.  Slept well.  Denies fever, chest pain, SOB, abdominal pain, diarrhea, constipation, calf pain.  Patient  eating well, voiding and last BM 6/25.   No acute events overnight.     PAST MEDICAL & SURGICAL HISTORY:  HEALTH ISSUES - PROBLEM Dx:  ATN (acute tubular necrosis): ATN (acute tubular necrosis)  Anemia, unspecified type: Anemia, unspecified type  Anemia: Anemia  Edema of lower extremity: Edema of lower extremity  Heroin use disorder, moderate, in controlled environment, dependence  Marijuana smoker  Cocaine abuse  Heroin abuse  Hypertension, unspecified type: Hypertension, unspecified type  Fungemia: Fungemia  Tobacco dependence: Tobacco dependence  Substance abuse: Substance abuse  Abscess of upper limb: Abscess of upper limb  Cellulitis of left upper extremity: Cellulitis of left upper extremity            REVIEW OF SYSTEMS:    CONSTITUTIONAL:  No distress  HEENT:  Eyes:  No diplopia or blurred vision.  CARDIOVASCULAR:  No pressure, squeezing, tightness, heaviness or aching about the chest; no palpitations.  RESPIRATORY:  No cough, shortness of breath, PND or orthopnea.   GASTROINTESTINAL:  No nausea, vomiting or diarrhea.  GENITOURINARY:  No dysuria, frequency or urgency.  NEUROLOGIC:  No paresthesias, fasciculations, seizures or weakness.  PSYCHIATRIC:  No disorder of thought or mood.      PHYSICAL EXAMINATION:    Vital Signs   T(C): 37.1  T(F): 98.8  HR: 83   BP: 130/90   RR: 18   SpO2: 99% on room air    GENERAL: The patient is awake and alert in no apparent distress.   LUNGS: Clear to auscultation BL without wheezing, rales or rhonchi; respirations unlabored  HEART: Regular rate and rhythm ,+S1/+S2, no murmurs, rubs, gallops  ABDOMEN: Soft, nontender, and nondistended, no rebound, guarding rigidity, bowel sounds in all 4 quadrants  EXTREMITIES: Without any cyanosis, clubbing, rash, lesions or edema.  SKIN: No new rashes or lesions.  MSK: strength equal BL  VASCULAR: Radial and Dorsal pedal pulses palpable BL  NEUROLOGIC: Grossly intact.  PSYCH: No new changes.    CAPILLARY BLOOD GLUCOSE    06-24 @ 07:01  -  06-25 @ 07:00  --------------------------------------------------------  IN: 2200 mL / OUT: 0 mL / NET: 2200 mL    Labs: NO current labs        Medications:  MEDICATIONS  (STANDING):  amLODIPine   Tablet 10 milliGRAM(s) Oral daily  chlorhexidine 4% Liquid 1 Application(s) Topical <User Schedule>  dextrose 5% + sodium chloride 0.45%. 1000 milliLiter(s) (100 mL/Hr) IV Continuous <Continuous>  fluconAZOLE   Tablet 400 milliGRAM(s) Oral daily  methadone    Tablet 5 milliGRAM(s) Oral daily  nicotine - 21 mG/24Hr(s) Patch 1 patch Transdermal daily  pantoprazole    Tablet 40 milliGRAM(s) Oral before breakfast  vancomycin  IVPB 1000 milliGRAM(s) IV Intermittent every 12 hours  zolpidem 10 milliGRAM(s) Oral at bedtime    MEDICATIONS  (PRN):  acetaminophen   Tablet .. 650 milliGRAM(s) Oral every 6 hours PRN Mild Pain (1 - 3)

## 2019-06-25 NOTE — PROGRESS NOTE ADULT - PROBLEM SELECTOR PLAN 1
s/p I and D by plastics. healing well   Blood cultures positive for Candida and MRSA,   s/p ALEJANDRO  and no evidence of vegetation  vanco 1g q12 stop on 4/28  Vanc trough today  BMP today

## 2019-06-26 PROCEDURE — 99232 SBSQ HOSP IP/OBS MODERATE 35: CPT

## 2019-06-26 PROCEDURE — 99233 SBSQ HOSP IP/OBS HIGH 50: CPT

## 2019-06-26 RX ADMIN — CHLORHEXIDINE GLUCONATE 1 APPLICATION(S): 213 SOLUTION TOPICAL at 06:03

## 2019-06-26 RX ADMIN — AMLODIPINE BESYLATE 10 MILLIGRAM(S): 2.5 TABLET ORAL at 06:02

## 2019-06-26 RX ADMIN — ZOLPIDEM TARTRATE 10 MILLIGRAM(S): 10 TABLET ORAL at 21:45

## 2019-06-26 RX ADMIN — FLUCONAZOLE 400 MILLIGRAM(S): 150 TABLET ORAL at 12:03

## 2019-06-26 RX ADMIN — Medication 1 PATCH: at 12:03

## 2019-06-26 RX ADMIN — Medication 1 PATCH: at 08:05

## 2019-06-26 RX ADMIN — PANTOPRAZOLE SODIUM 40 MILLIGRAM(S): 20 TABLET, DELAYED RELEASE ORAL at 06:02

## 2019-06-26 RX ADMIN — Medication 250 MILLIGRAM(S): at 06:02

## 2019-06-26 RX ADMIN — METHADONE HYDROCHLORIDE 10 MILLIGRAM(S): 40 TABLET ORAL at 12:03

## 2019-06-26 RX ADMIN — Medication 250 MILLIGRAM(S): at 18:40

## 2019-06-26 RX ADMIN — METHADONE HYDROCHLORIDE 5 MILLIGRAM(S): 40 TABLET ORAL at 08:52

## 2019-06-26 NOTE — PROGRESS NOTE BEHAVIORAL HEALTH - NSBHCONSULTFOLLOWAFTERCARE_PSY_A_CORE FT
see above
Patient is planning on going to his Suboxone clinic after discharge
Patient is planning on going to his Suboxone clinic after discharge
Patient changed his mind about going to his Suboxone clinic after discharge and would like a Methadone Clinic referral with substance abuse counseling instead.

## 2019-06-26 NOTE — PROGRESS NOTE BEHAVIORAL HEALTH - NSBHCHARTREVIEWVS_PSY_A_CORE FT
Temp (F): 98.8 Degrees F; HR 83; /90; RR 18; SpO2 (%) SpO2 (%): 99 %
Temp (F): 97.8 Degrees F; HR 76; /87; RR 18; SpO2 (%) SpO2 (%): 99 %
Temp (F): 98.1 Degrees F; HR 77; /95; RR 18; spO2 (%) SpO2 (%): 98 %
Temp (F): 97.2 Degrees F; HR 66; Bp 155/ 98; RR 18 /min; SpO2 (%) SpO2 (%): 98 %

## 2019-06-26 NOTE — PROGRESS NOTE ADULT - SUBJECTIVE AND OBJECTIVE BOX
Patient seen and examined at bedside and chart was reviewed, No acute overnight events. Patient is pleasant, comfortable and cooperative. Slept well. Denies fever, chest pain, SOB, abdominal pain, diarrhea, constipation, calf pain.  Patient  eating well, voiding and last BM this morning. Pt feels well and does not have any complaints.       PAST MEDICAL & SURGICAL HISTORY:  HEALTH ISSUES - PROBLEM Dx:  ATN (acute tubular necrosis): ATN (acute tubular necrosis)  Anemia, unspecified type: Anemia, unspecified type  Anemia: Anemia  Edema of lower extremity: Edema of lower extremity  Heroin use disorder, moderate, in controlled environment, dependence  Marijuana smoker  Cocaine abuse  Heroin abuse  Hypertension, unspecified type: Hypertension, unspecified type  Fungemia: Fungemia  Tobacco dependence: Tobacco dependence  Substance abuse: Substance abuse  Abscess of upper limb: Abscess of upper limb  Cellulitis of left upper extremity: Cellulitis of left upper extremity            REVIEW OF SYSTEMS:    CONSTITUTIONAL:  No distress  HEENT:  Eyes:  No diplopia or blurred vision.   ENT:  No earache, sore throat or runny nose.  CARDIOVASCULAR:  No pressure, squeezing, tightness, heaviness or aching about the chest; no palpitations.  RESPIRATORY:  No cough, shortness of breath, PND or orthopnea.   GASTROINTESTINAL:  No nausea, vomiting or diarrhea.  GENITOURINARY:  No dysuria, frequency or urgency.  NEUROLOGIC:  No paresthesias, fasciculations, seizures or weakness.  PSYCHIATRIC:  No disorder of thought or mood.      PHYSICAL EXAMINATION:    Vital Signs       T(F): 97.8   HR: 76   BP: 124/87    RR: 18   SpO2: 99%     GENERAL: The patient is awake and alert in no apparent distress.   HEENT: Head is normocephalic and atraumatic. Extraocular muscles are intact. Mucous membranes are moist. No JVD,   NECK: Supple.  LUNGS: Clear to auscultation BL without wheezing, rales or rhonchi; respirations unlabored  HEART: Regular rate and rhythm ,+S1/+S2, no murmurs, rubs, gallops  EXTREMITIES: Without any cyanosis, clubbing, rash, lesions or edema.  SKIN: No new rashes or lesions.  MSK: strength equal BL  PSYCH: No new changes.    CAPILLARY BLOOD GLUCOSE    06-25 @ 07:01  -  06-26 @ 07:00  --------------------------------------------------------  IN: 750 mL / OUT: 0 mL / NET: 750 mL        Labs:  06-25    140  |  107  |  27<H>  ----------------------------<  118<H>  3.8   |  24  |  1.29    Ca    9.1      25 Jun 2019 18:10        06-25 @ 07:01 - 06-26 @ 07:00  --------------------------------------------------------  IN: 750 mL / OUT: 0 mL / NET: 750 mL              Medications:  MEDICATIONS  (STANDING):  amLODIPine   Tablet 10 milliGRAM(s) Oral daily  chlorhexidine 4% Liquid 1 Application(s) Topical <User Schedule>  fluconAZOLE   Tablet 400 milliGRAM(s) Oral daily  methadone    Tablet 10 milliGRAM(s) Oral daily  nicotine - 21 mG/24Hr(s) Patch 1 patch Transdermal daily  pantoprazole    Tablet 40 milliGRAM(s) Oral before breakfast  vancomycin  IVPB 1000 milliGRAM(s) IV Intermittent every 12 hours    MEDICATIONS  (PRN):  acetaminophen   Tablet .. 650 milliGRAM(s) Oral every 6 hours PRN Mild Pain (1 - 3)  methadone    Tablet 5 milliGRAM(s) Oral daily PRN breakthrough opiate cravings / withdrawal symptoms  zolpidem 10 milliGRAM(s) Oral at bedtime PRN Insomnia

## 2019-06-26 NOTE — DISCHARGE NOTE NURSING/CASE MANAGEMENT/SOCIAL WORK - NSDCDPATPORTLINK_GEN_ALL_CORE
You can access the [email or web address], offered by [facility], by registering with the following website: [email or web address]

## 2019-06-26 NOTE — DISCHARGE NOTE NURSING/CASE MANAGEMENT/SOCIAL WORK - NSDCPEWEB_GEN_ALL_CORE
[facility] for Tobacco Control website --- [email or web address] website --- [email or web address]

## 2019-06-26 NOTE — CHART NOTE - NSCHARTNOTEFT_GEN_A_CORE
Assessment: Pt seen for follow-up. Pt with hx heroine abuse with cellulitis presents with MRSA abscess, bacteremia & Fungemia of upper limb    Factors impacting intake: [x ] none [ ] nausea  [ ] vomiting [ ] diarrhea [ ] constipation  [ ]chewing problems [ ] swallowing issues  [ ] other:     Diet Prescription: Diet, Regular (06-07-19 @ 20:30)    Intake: Pt consuming 50-75% meals & tolerated well. Pt reports last BM x 1(6/26). No GI distress    Current Weight: Wt=85kg(6/26/19), Wt=85.4kg(6/14)  % Weight Change Wt remains stable x 12 days    Physical appearance: +1 edema Carson feet    Pertinent Medications: MEDICATIONS  (STANDING):  amLODIPine   Tablet 10 milliGRAM(s) Oral daily  chlorhexidine 4% Liquid 1 Application(s) Topical <User Schedule>  fluconAZOLE   Tablet 400 milliGRAM(s) Oral daily  methadone    Tablet 10 milliGRAM(s) Oral daily  nicotine - 21 mG/24Hr(s) Patch 1 patch Transdermal daily  pantoprazole    Tablet 40 milliGRAM(s) Oral before breakfast  vancomycin  IVPB 1000 milliGRAM(s) IV Intermittent every 12 hours    MEDICATIONS  (PRN):  acetaminophen   Tablet .. 650 milliGRAM(s) Oral every 6 hours PRN Mild Pain (1 - 3)  methadone    Tablet 5 milliGRAM(s) Oral daily PRN breakthrough opiate cravings / withdrawal symptoms  zolpidem 10 milliGRAM(s) Oral at bedtime PRN Insomnia    Pertinent Labs: 06-25 Na 140 mmol/L Glu 118 mg/dL<H> K+ 3.8 mmol/L Cr 1.29 mg/dL BUN 27 mg/dL<H> Phos n/a   Alb 2.6(6/13)   PAB n/a   Hgb 10   Hct 28.8   HgA1C n/a    Glucose, Serum: 118 mg/dL <H>   24Hr FS:  Skin:  intact    Estimated Needs:   [x ] no change since previous assessment (6/12/19)  [ ] recalculated:     Previous Nutrition Diagnosis: No active nutrition diagnosis identified at this time. Patient meeting estimated nutrient needs  Nutrition Diagnosis is [x ] ongoing  [ ] resolved  [ ] improved  [ ] not applicable       New Nutrition Diagnosis: [x ] not applicable       Interventions:   Recommend  [x ] Continue: Regular diet  [ ] Change Diet To:  [ ] Nutrition Supplement:  [ ] Nutrition Support:  [x ] Other: Cater to food preferences    Monitoring and Evaluation:   [x ] PO intake [ x ] Tolerance to diet prescription [ x ] weights [ x ] labs[ x ] follow up per protocol  [ ] other:

## 2019-06-26 NOTE — DISCHARGE NOTE NURSING/CASE MANAGEMENT/SOCIAL WORK - NSDCFUADDAPPT_GEN_ALL_CORE_FT
Pt is now set up for outpatient Methadone program at [facility]  Pt has his intake appointment scheduled for Monday, July 1, 2019 at 7:30Am at [address] [place], [place] [address], [phone].  This appointment includes intake with BHAVYA, RN and MD before medication dispensing.  This appointment is approximately 3 hours in duration.  Pts SW at Program is Lizz Hernández [phone].  Pt will need to bring his photo ID and insurance card to appointment.  Pt is aware of all of the above

## 2019-06-26 NOTE — DISCHARGE NOTE NURSING/CASE MANAGEMENT/SOCIAL WORK - NSDCPEEMAIL_GEN_ALL_CORE
[facility] for Tobacco Control email [email or web address]

## 2019-06-26 NOTE — PROGRESS NOTE BEHAVIORAL HEALTH - NSBHFUPREASONCONS_PSY_A_CORE
other substance
other substance/med management
other substance/med management
med management/other substance

## 2019-06-26 NOTE — PROGRESS NOTE BEHAVIORAL HEALTH - NSBHFUPINTERVALHXFT_PSY_A_CORE
Patient starting the Methadone 10mg PO qd maintenance dose today (he has been using 5mg methadone PRNs when he was only on 5mg PO qd indicating his baseline maintenance dose need is higher hence the increase). He is still at a low methadone dose overall. Patient reports that he is feeling better since admission and he is looking forward to completing his medical treatment, going home and getting on with his life.  His sleep has also improved since admission and he no longer needs to use the Ambien PRN at night - much less frequency of use.

## 2019-06-26 NOTE — PROGRESS NOTE BEHAVIORAL HEALTH - PRIMARY DX
Heroin use disorder, moderate, in controlled environment, dependence

## 2019-06-26 NOTE — PROGRESS NOTE ADULT - PROBLEM SELECTOR PLAN 1
s/p I and D by plastics. healing well   Blood cultures positive for Candida and MRSA,   s/p ALEJANDRO  and no evidence of vegetation  vanco 1g q12 stop on 4/28  Vanc trough 12.6-will continue vanco 1g q12  Cr is 1.29-stable

## 2019-06-26 NOTE — PROGRESS NOTE BEHAVIORAL HEALTH - NSBHCONSULTFOLLOW_PSY_A_CORE
Signing off - call with questions…

## 2019-06-26 NOTE — PROGRESS NOTE ADULT - SUBJECTIVE AND OBJECTIVE BOX
39 year old male presents today for follow up, pt presents with a draining wound on his left forearm, pt was seen yesterday but did not want to stay, he did want to try oral antibiotics, he does admit to heroin use prior to his symptoms, he did have fever on Saturday and Sunday then swelling which worsened, he denies tenderness +drainage of pus today - denies local pain - OF note -## he does not want anybody especially his fiance to know about his drug use## (06 Jun 2019 12:25)  here suppurative thrombophlebitis from candida and methicillin resitant staph aureus and cns   ALEJANDRO NEGATIVE       Allergies    No Known Allergies    Intolerances        MEDICATIONS  (STANDING):  amLODIPine   Tablet 10 milliGRAM(s) Oral daily  chlorhexidine 4% Liquid 1 Application(s) Topical <User Schedule>  fluconAZOLE   Tablet 400 milliGRAM(s) Oral daily  methadone    Tablet 10 milliGRAM(s) Oral daily  nicotine - 21 mG/24Hr(s) Patch 1 patch Transdermal daily  pantoprazole    Tablet 40 milliGRAM(s) Oral before breakfast  vancomycin  IVPB 1000 milliGRAM(s) IV Intermittent every 12 hours    MEDICATIONS  (PRN):  acetaminophen   Tablet .. 650 milliGRAM(s) Oral every 6 hours PRN Mild Pain (1 - 3)  methadone    Tablet 5 milliGRAM(s) Oral daily PRN breakthrough opiate cravings / withdrawal symptoms  zolpidem 10 milliGRAM(s) Oral at bedtime PRN Insomnia      REVIEW OF SYSTEMS:    awaiting discharge     VITAL SIGNS:  T(C): 37.3 (06-26-19 @ 11:20), Max: 37.3 (06-26-19 @ 11:20)  T(F): 99.1 (06-26-19 @ 11:20), Max: 99.1 (06-26-19 @ 11:20)  HR: 98 (06-26-19 @ 11:20) (67 - 98)  BP: 124/86 (06-26-19 @ 11:20) (124/86 - 146/79)  RR: 18 (06-26-19 @ 11:20) (18 - 18)  SpO2: 100% (06-26-19 @ 11:20) (97% - 100%)  Wt(kg): --    PHYSICAL EXAM:    GENERAL: not in any distress  HEENT: Neck is supple, normocephalic, atraumatic   CHEST/LUNG: Clear to auscultation bilaterally; No rales, rhonchi, wheezing  HEART: Regular rate and rhythm; No murmurs, rubs, or gallops  ABDOMEN: Soft, Nontender, Nondistended; Bowel sounds present, no rebound   EXTREMITIES:  2+ Peripheral Pulses, No clubbing, cyanosis, or edema  SKIN: stable   BACK: no pressor sore   NERVOUS SYSTEM:  Alert & Oriented X3, Good concentration  PSYCH: normal affect     LABS:     06-25    140  |  107  |  27<H>  ----------------------------<  118<H>  3.8   |  24  |  1.29    Ca    9.1      25 Jun 2019 18:10                          Vancomycin Level, Trough: 12.6 ug/mL (06-25 @ 18:10)                      Radiology:

## 2019-06-26 NOTE — PROGRESS NOTE BEHAVIORAL HEALTH - SUMMARY
SAME RECOMMENDATIONS:   - methadone 10mg PO qd for Maintenance / Opiate dependence with 5mg PO qd PRN for cravings / breakthrough symptoms  - Patient has capacity to make his medical decisions including leaving AMA  - patient would like to attend an outpatient Methadone Clinic after discharge as well as substance abuse counseling
RECOMMENDATIONS:   - discontinued Klonopin as Patient has not used it at all   - continue PRN methadone for opiate withdrawal symptoms (also helps with pain) while admitted; continue Ambien 10mg PO qhs while admitted (does NOT need RX for Ambien or methadone on discharge)  - risks/benefits/alternatives discussed and given pt's history, he will not have clinical response to  - has capacity to make his medical decisions   - as per Suboxone treatment protocol, induction needs to be at prescriber's office with monitoring while patient has moderate withdrawal symptoms for maximum clinical response. Hence Suboxone should not be started at this time during this admission. Patient is planning on going to his Suboxone clinic after discharge  - signing off
No new RECOMMENDATIONS:   - continue PRN methadone for opiate withdrawal symptoms (also helps with pain); continue Ambien 10mg PO qhs and PRN klonopin (does NOT need RX for Ambien or Klonopin on discharge)  - risks/benefits/alternatives discussed and given pt's history, he will not have clinical response to  - has capacity to make his medical decisions   - as per Suboxone treatment protocol, induction needs to be at prescriber's office with monitoring while patient has moderate withdrawal symptoms for maximum clinical response. Hence Suboxone should not be started at this time during this admission. Patient is planning on going to his Suboxone clinic after discharge  - signing off
RECOMMENDATIONS:   - methadone 10mg PO qd for Maintenance / Opiate dependence with 5mg PO qd PRN for cravings / breakthrough symptoms  - Patient has capacity to make his medical decisions including leaving AMA  - signing off

## 2019-06-27 LAB
ANION GAP SERPL CALC-SCNC: 8 MMOL/L — SIGNIFICANT CHANGE UP (ref 5–17)
BUN SERPL-MCNC: 29 MG/DL — HIGH (ref 7–23)
CALCIUM SERPL-MCNC: 9.5 MG/DL — SIGNIFICANT CHANGE UP (ref 8.5–10.1)
CHLORIDE SERPL-SCNC: 103 MMOL/L — SIGNIFICANT CHANGE UP (ref 96–108)
CO2 SERPL-SCNC: 25 MMOL/L — SIGNIFICANT CHANGE UP (ref 22–31)
CREAT SERPL-MCNC: 1.28 MG/DL — SIGNIFICANT CHANGE UP (ref 0.5–1.3)
GLUCOSE SERPL-MCNC: 105 MG/DL — HIGH (ref 70–99)
POTASSIUM SERPL-MCNC: 4 MMOL/L — SIGNIFICANT CHANGE UP (ref 3.5–5.3)
POTASSIUM SERPL-SCNC: 4 MMOL/L — SIGNIFICANT CHANGE UP (ref 3.5–5.3)
SODIUM SERPL-SCNC: 136 MMOL/L — SIGNIFICANT CHANGE UP (ref 135–145)

## 2019-06-27 PROCEDURE — 99233 SBSQ HOSP IP/OBS HIGH 50: CPT

## 2019-06-27 RX ORDER — LINEZOLID 600 MG/300ML
600 INJECTION, SOLUTION INTRAVENOUS EVERY 12 HOURS
Refills: 0 | Status: DISCONTINUED | OUTPATIENT
Start: 2019-06-27 | End: 2019-07-01

## 2019-06-27 RX ADMIN — AMLODIPINE BESYLATE 10 MILLIGRAM(S): 2.5 TABLET ORAL at 07:04

## 2019-06-27 RX ADMIN — METHADONE HYDROCHLORIDE 10 MILLIGRAM(S): 40 TABLET ORAL at 11:43

## 2019-06-27 RX ADMIN — PANTOPRAZOLE SODIUM 40 MILLIGRAM(S): 20 TABLET, DELAYED RELEASE ORAL at 07:55

## 2019-06-27 RX ADMIN — ZOLPIDEM TARTRATE 10 MILLIGRAM(S): 10 TABLET ORAL at 21:55

## 2019-06-27 RX ADMIN — Medication 1 PATCH: at 02:52

## 2019-06-27 RX ADMIN — LINEZOLID 600 MILLIGRAM(S): 600 INJECTION, SOLUTION INTRAVENOUS at 17:29

## 2019-06-27 RX ADMIN — Medication 1 PATCH: at 07:55

## 2019-06-27 RX ADMIN — Medication 1 PATCH: at 20:25

## 2019-06-27 RX ADMIN — FLUCONAZOLE 400 MILLIGRAM(S): 150 TABLET ORAL at 11:43

## 2019-06-27 RX ADMIN — CHLORHEXIDINE GLUCONATE 1 APPLICATION(S): 213 SOLUTION TOPICAL at 07:04

## 2019-06-27 RX ADMIN — Medication 1 PATCH: at 11:43

## 2019-06-27 RX ADMIN — LINEZOLID 600 MILLIGRAM(S): 600 INJECTION, SOLUTION INTRAVENOUS at 07:04

## 2019-06-27 RX ADMIN — METHADONE HYDROCHLORIDE 5 MILLIGRAM(S): 40 TABLET ORAL at 07:58

## 2019-06-27 NOTE — PROGRESS NOTE ADULT - PROBLEM SELECTOR PLAN 1
s/p I and D by plastics. healing well   Blood cultures positive for Candida and MRSA,   s/p ALEJANDRO  and no evidence of vegetation  vanco 1g q12 stop on 4/28  Vanc trough 12.6-will continue vanco 1g q12  Cr is 1.29-stable  On zyvox for now as IV is not functioning.

## 2019-06-27 NOTE — PROGRESS NOTE ADULT - SUBJECTIVE AND OBJECTIVE BOX
HPI:  39 year old male presents today for follow up, pt presents with a draining wound on his left forearm, pt was seen yesterday but did not want to stay, he did want to try oral antibiotics, he does admit to heroin use prior to his symptoms, he did have fever on Saturday and Sunday then swelling which worsened, he denies tenderness +drainage of pus today - denies local pain - OF note -## he does not want anybody especially his fiance to know about his drug use## (06 Jun 2019 12:25)      Allergies    No Known Allergies    Intolerances        MEDICATIONS  (STANDING):  amLODIPine   Tablet 10 milliGRAM(s) Oral daily  chlorhexidine 4% Liquid 1 Application(s) Topical <User Schedule>  fluconAZOLE   Tablet 400 milliGRAM(s) Oral daily  linezolid    Tablet 600 milliGRAM(s) Oral every 12 hours  methadone    Tablet 10 milliGRAM(s) Oral daily  nicotine - 21 mG/24Hr(s) Patch 1 patch Transdermal daily  pantoprazole    Tablet 40 milliGRAM(s) Oral before breakfast  vancomycin  IVPB 1000 milliGRAM(s) IV Intermittent every 12 hours    MEDICATIONS  (PRN):  acetaminophen   Tablet .. 650 milliGRAM(s) Oral every 6 hours PRN Mild Pain (1 - 3)  methadone    Tablet 5 milliGRAM(s) Oral daily PRN breakthrough opiate cravings / withdrawal symptoms  zolpidem 10 milliGRAM(s) Oral at bedtime PRN Insomnia      REVIEW OF SYSTEMS:    CONSTITUTIONAL: No fever, chills, weight loss, or fatigue  HEENT: No sore throat, runny nose, ear ache  RESPIRATORY: No cough, wheezing, No shortness of breath  CARDIOVASCULAR: No chest pain, palpitations, dizziness  GASTROINTESTINAL: No abdominal pain. No nausea, vomiting, diarrhea  GENITOURINARY: No dysuria, increase frequency, hematuria, or incontinence  NEUROLOGICAL: No headaches, memory loss, loss of strength, numbness, or tremors, no weakness  EXTREMITY: No pedal edema BLE  SKIN: No itching, burning, rashes, or lesions     VITAL SIGNS:  T(C): 36.7 (06-27-19 @ 11:11), Max: 37 (06-26-19 @ 18:19)  T(F): 98 (06-27-19 @ 11:11), Max: 98.6 (06-26-19 @ 18:19)  HR: 83 (06-27-19 @ 11:11) (71 - 83)  BP: 129/82 (06-27-19 @ 11:11) (125/79 - 133/88)  RR: 18 (06-27-19 @ 11:11) (18 - 18)  SpO2: 99% (06-27-19 @ 11:11) (95% - 100%)  Wt(kg): --    PHYSICAL EXAM:    GENERAL: not in any distress  HEENT: Neck is supple, normocephalic, atraumatic   CHEST/LUNG: Clear to auscultation bilaterally; No rales, rhonchi, wheezing  HEART: Regular rate and rhythm; No murmurs, rubs, or gallops  ABDOMEN: Soft, Nontender, Nondistended; Bowel sounds present, no rebound   EXTREMITIES:  2+ Peripheral Pulses, No clubbing, cyanosis, or edema  GENITOURINARY:   SKIN: No rashes or lesions  BACK: no pressor sore   NERVOUS SYSTEM:  Alert & Oriented X3, Good concentration  PSYCH: normal affect     LABS:     06-25    140  |  107  |  27<H>  ----------------------------<  118<H>  3.8   |  24  |  1.29    Ca    9.1      25 Jun 2019 18:10                          Vancomycin Level, Trough: 12.6 ug/mL (06-25 @ 18:10)                      Radiology: HPI:  39 year old male presents today for follow up, pt presents with a draining wound on his left forearm, pt was seen yesterday but did not want to stay, he did want to try oral antibiotics, he does admit to heroin use prior to his symptoms, he did have fever on Saturday and Sunday then swelling which worsened, he denies tenderness +drainage of pus today - denies local pain   here suppurative thrombophlebitis from candida and methicillin resitant staph aureus and cns   ALEJANDRO NEGATIVE         Allergies    No Known Allergies    Intolerances        MEDICATIONS  (STANDING):  amLODIPine   Tablet 10 milliGRAM(s) Oral daily  chlorhexidine 4% Liquid 1 Application(s) Topical <User Schedule>  fluconAZOLE   Tablet 400 milliGRAM(s) Oral daily  linezolid    Tablet 600 milliGRAM(s) Oral every 12 hours  methadone    Tablet 10 milliGRAM(s) Oral daily  nicotine - 21 mG/24Hr(s) Patch 1 patch Transdermal daily  pantoprazole    Tablet 40 milliGRAM(s) Oral before breakfast  vancomycin  IVPB 1000 milliGRAM(s) IV Intermittent every 12 hours    MEDICATIONS  (PRN):  acetaminophen   Tablet .. 650 milliGRAM(s) Oral every 6 hours PRN Mild Pain (1 - 3)  methadone    Tablet 5 milliGRAM(s) Oral daily PRN breakthrough opiate cravings / withdrawal symptoms  zolpidem 10 milliGRAM(s) Oral at bedtime PRN Insomnia      REVIEW OF SYSTEMS:    CONSTITUTIONAL: No fever, chills, weight loss, or fatigue  HEENT: No sore throat, runny nose, ear ache  RESPIRATORY: No cough, wheezing, No shortness of breath  CARDIOVASCULAR: No chest pain, palpitations, dizziness  GASTROINTESTINAL: No abdominal pain. No nausea, vomiting, diarrhea  GENITOURINARY: No dysuria, increase frequency, hematuria, or incontinence  NEUROLOGICAL: No headaches, memory loss, loss of strength, numbness, or tremors, no weakness  EXTREMITY: No pedal edema BLE  SKIN: resolving wound   depressed   anxious about discharge   VITAL SIGNS:  T(C): 36.7 (06-27-19 @ 11:11), Max: 37 (06-26-19 @ 18:19)  T(F): 98 (06-27-19 @ 11:11), Max: 98.6 (06-26-19 @ 18:19)  HR: 83 (06-27-19 @ 11:11) (71 - 83)  BP: 129/82 (06-27-19 @ 11:11) (125/79 - 133/88)  RR: 18 (06-27-19 @ 11:11) (18 - 18)  SpO2: 99% (06-27-19 @ 11:11) (95% - 100%)  Wt(kg): --    PHYSICAL EXAM:    GENERAL: not in any distress  HEENT: Neck is supple, normocephalic, atraumatic   CHEST/LUNG: Clear to auscultation bilaterally; No rales, rhonchi, wheezing  HEART: Regular rate and rhythm; No murmurs, rubs, or gallops  ABDOMEN: Soft, Nontender, Nondistended; Bowel sounds present, no rebound   EXTREMITIES:  2+ Peripheral Pulses, No clubbing, cyanosis, or edema  healing arm  SKIN: No rashes or lesions  BACK: no pressor sore   NERVOUS SYSTEM:  Alert & Oriented X3, Good concentration  PSYCH: normal affect     LABS:     06-25    140  |  107  |  27<H>  ----------------------------<  118<H>  3.8   |  24  |  1.29    Ca    9.1      25 Jun 2019 18:10                          Vancomycin Level, Trough: 12.6 ug/mL (06-25 @ 18:10)                      Radiology:

## 2019-06-27 NOTE — PROGRESS NOTE ADULT - ASSESSMENT
intravenous drug addict suppurative thrombo phlebitis   cns candida   micro final blood culture is cns   as per micro pcr was methicillin resitant staph aureus though grew cns   ALEJANDRO NEGATIVE   plan to finish 3 weeks iv vanco from admission   diflucan 400 daily   end date  for both is 6/28/2019 intravenous drug addict suppurative thrombo phlebitis   cns candida   micro final blood culture is cns   as per micro pcr was methicillin resitant staph aureus though grew cns   ALEJANDRO NEGATIVE   has no access   finish course with zyvox   diflucan 400 daily   end date  for both is 6/28/2019

## 2019-06-27 NOTE — PROGRESS NOTE ADULT - SUBJECTIVE AND OBJECTIVE BOX
Patient seen and examined at bedside and chart was reviewed, No acute overnight events. Patient is pleasant, comfortable and cooperative. Slept well  Denies fever, chest pain, SOB, abdominal pain, diarrhea, constipation, calf pain. Patient  eating well, voiding without any complaints. IV not working. Pt switched to oral antibiotics for now. No further complaints.         PAST MEDICAL & SURGICAL HISTORY:  HEALTH ISSUES - PROBLEM Dx:  ATN (acute tubular necrosis): ATN (acute tubular necrosis)  Anemia, unspecified type: Anemia, unspecified type  Anemia: Anemia  Edema of lower extremity: Edema of lower extremity  Heroin use disorder, moderate, in controlled environment, dependence  Marijuana smoker  Cocaine abuse  Heroin abuse  Hypertension, unspecified type: Hypertension, unspecified type  Fungemia: Fungemia  Tobacco dependence: Tobacco dependence  Substance abuse: Substance abuse  Abscess of upper limb: Abscess of upper limb  Cellulitis of left upper extremity: Cellulitis of left upper extremity            REVIEW OF SYSTEMS:    CONSTITUTIONAL:  No distress  HEENT:  Eyes:  No diplopia or blurred vision.   ENT:  No earache, sore throat or runny nose.  CARDIOVASCULAR:  No pressure, squeezing, tightness, heaviness or aching about the chest; no palpitations.  RESPIRATORY:  No cough, shortness of breath, PND or orthopnea.   GASTROINTESTINAL:  No nausea, vomiting or diarrhea.  GENITOURINARY:  No dysuria, frequency or urgency.  NEUROLOGIC:  No paresthesias, fasciculations, seizures or weakness.  PSYCHIATRIC:  No disorder of thought or mood.      PHYSICAL EXAMINATION:    Vital Signs Last 24 Hrs  T(C): 36.7 (27 Jun 2019 11:11), Max: 37 (26 Jun 2019 18:19)  T(F): 98 (27 Jun 2019 11:11), Max: 98.6 (26 Jun 2019 18:19)  HR: 83 (27 Jun 2019 11:11) (71 - 83)  BP: 129/82 (27 Jun 2019 11:11) (125/79 - 133/88)  BP(mean): --  RR: 18 (27 Jun 2019 11:11) (18 - 18)  SpO2: 99% (27 Jun 2019 11:11) (95% - 100%)    GENERAL: The patient is awake and alert in no apparent distress.   HEENT: Head is normocephalic and atraumatic. Extraocular muscles are intact. Mucous membranes are moist. No throat erythema/exudates no lymphadenopathy, no JVD,   NECK: Supple.  LUNGS: Clear to auscultation BL without wheezing, rales or rhonchi; respirations unlabored  HEART: Regular rate and rhythm ,+S1/+S2, no murmurs, rubs, gallops  ABDOMEN: Soft, nontender, and nondistended, no rebound, guarding rigidity, bowel sounds in all 4 quadrants  EXTREMITIES: Without any cyanosis, clubbing, rash, lesions or edema.  SKIN: No new rashes or lesions.  MSK: strength equal BL  VASCULAR: Radial and Dorsal pedal pulses palpable BL  NEUROLOGIC: Grossly intact.  PSYCH: No new changes.    CAPILLARY BLOOD GLUCOSE    06-26 @ 07:01  -  06-27 @ 07:00  --------------------------------------------------------  IN: 720 mL / OUT: 0 mL / NET: 720 mL    06-27 @ 07:01 - 06-27 @ 12:58  --------------------------------------------------------  IN: 300 mL / OUT: 0 mL / NET: 300 mL        Labs:  06-25    140  |  107  |  27<H>  ----------------------------<  118<H>  3.8   |  24  |  1.29    Ca    9.1      25 Jun 2019 18:10        06-26 @ 07:01 - 06-27 @ 07:00  --------------------------------------------------------  IN: 720 mL / OUT: 0 mL / NET: 720 mL    06-27 @ 07:01 - 06-27 @ 12:58  --------------------------------------------------------  IN: 300 mL / OUT: 0 mL / NET: 300 mL              Medications:  MEDICATIONS  (STANDING):  amLODIPine   Tablet 10 milliGRAM(s) Oral daily  chlorhexidine 4% Liquid 1 Application(s) Topical <User Schedule>  fluconAZOLE   Tablet 400 milliGRAM(s) Oral daily  linezolid    Tablet 600 milliGRAM(s) Oral every 12 hours  methadone    Tablet 10 milliGRAM(s) Oral daily  nicotine - 21 mG/24Hr(s) Patch 1 patch Transdermal daily  pantoprazole    Tablet 40 milliGRAM(s) Oral before breakfast  vancomycin  IVPB 1000 milliGRAM(s) IV Intermittent every 12 hours    MEDICATIONS  (PRN):  acetaminophen   Tablet .. 650 milliGRAM(s) Oral every 6 hours PRN Mild Pain (1 - 3)  methadone    Tablet 5 milliGRAM(s) Oral daily PRN breakthrough opiate cravings / withdrawal symptoms  zolpidem 10 milliGRAM(s) Oral at bedtime PRN Insomnia

## 2019-06-28 PROCEDURE — 99233 SBSQ HOSP IP/OBS HIGH 50: CPT

## 2019-06-28 RX ADMIN — CHLORHEXIDINE GLUCONATE 1 APPLICATION(S): 213 SOLUTION TOPICAL at 05:43

## 2019-06-28 RX ADMIN — FLUCONAZOLE 400 MILLIGRAM(S): 150 TABLET ORAL at 12:40

## 2019-06-28 RX ADMIN — Medication 1 PATCH: at 12:40

## 2019-06-28 RX ADMIN — Medication 1 PATCH: at 20:18

## 2019-06-28 RX ADMIN — AMLODIPINE BESYLATE 10 MILLIGRAM(S): 2.5 TABLET ORAL at 05:43

## 2019-06-28 RX ADMIN — METHADONE HYDROCHLORIDE 10 MILLIGRAM(S): 40 TABLET ORAL at 12:43

## 2019-06-28 RX ADMIN — Medication 1 PATCH: at 07:18

## 2019-06-28 RX ADMIN — ZOLPIDEM TARTRATE 10 MILLIGRAM(S): 10 TABLET ORAL at 21:53

## 2019-06-28 RX ADMIN — LINEZOLID 600 MILLIGRAM(S): 600 INJECTION, SOLUTION INTRAVENOUS at 05:43

## 2019-06-28 RX ADMIN — PANTOPRAZOLE SODIUM 40 MILLIGRAM(S): 20 TABLET, DELAYED RELEASE ORAL at 08:19

## 2019-06-28 RX ADMIN — LINEZOLID 600 MILLIGRAM(S): 600 INJECTION, SOLUTION INTRAVENOUS at 19:14

## 2019-06-28 NOTE — PROGRESS NOTE ADULT - SUBJECTIVE AND OBJECTIVE BOX
Patient seen and examined at bedside and chart was reviewed, No acute overnight events. Patient is pleasant, comfortable and cooperative. Slept well  Denies fever, chest pain, SOB, abdominal pain, diarrhea, constipation, calf pain. Patient  eating well, voiding and last BM today. No complaints at bedside.        PAST MEDICAL & SURGICAL HISTORY:  HEALTH ISSUES - PROBLEM Dx:  ATN (acute tubular necrosis): ATN (acute tubular necrosis)  Anemia, unspecified type: Anemia, unspecified type  Anemia: Anemia  Edema of lower extremity: Edema of lower extremity  Heroin use disorder, moderate, in controlled environment, dependence  Marijuana smoker  Cocaine abuse  Heroin abuse  Hypertension, unspecified type: Hypertension, unspecified type  Fungemia: Fungemia  Tobacco dependence: Tobacco dependence  Substance abuse: Substance abuse  Abscess of upper limb: Abscess of upper limb  Cellulitis of left upper extremity: Cellulitis of left upper extremity            REVIEW OF SYSTEMS:    CONSTITUTIONAL:  No distress  HEENT:  Eyes:  No diplopia or blurred vision.   ENT:  No earache, sore throat or runny nose.  CARDIOVASCULAR:  No pressure, squeezing, tightness, heaviness or aching about the chest; no palpitations.  RESPIRATORY:  No cough, shortness of breath, PND or orthopnea.   GASTROINTESTINAL:  No nausea, vomiting or diarrhea.  GENITOURINARY:  No dysuria, frequency or urgency.  NEUROLOGIC:  No paresthesias, fasciculations, seizures or weakness.  PSYCHIATRIC:  No disorder of thought or mood.      PHYSICAL EXAMINATION:    Vital Signs       T(F): 98.1    HR: 84    BP: 127/91    RR: 17    SpO2: 98%     GENERAL: The patient is awake and alert in no apparent distress.   HEENT: Head is normocephalic and atraumatic. Extraocular muscles are intact. Mucous membranes are moist. No throat erythema/exudates no lymphadenopathy, no JVD,   NECK: Supple.  LUNGS: Clear to auscultation BL without wheezing, rales or rhonchi; respirations unlabored  HEART: Regular rate and rhythm ,+S1/+S2, no murmurs, rubs, gallops  ABDOMEN: Soft, nontender, and nondistended, no rebound, guarding rigidity, bowel sounds in all 4 quadrants  EXTREMITIES: Without any cyanosis, clubbing, rash, lesions or edema.  SKIN: No new rashes or lesions.  MSK: strength equal BL  VASCULAR: Radial and Dorsal pedal pulses palpable BL  NEUROLOGIC: Grossly intact.  PSYCH: No new changes.    CAPILLARY BLOOD GLUCOSE    06-27 @ 07:01 - 06-28 @ 07:00  --------------------------------------------------------  IN: 660 mL / OUT: 0 mL / NET: 660 mL        Labs:  06-27    136  |  103  |  29<H>  ----------------------------<  105<H>  4.0   |  25  |  1.28    Ca    9.5      27 Jun 2019 15:51        06-27 @ 07:01 - 06-28 @ 07:00  --------------------------------------------------------  IN: 660 mL / OUT: 0 mL / NET: 660 mL              Medications:  MEDICATIONS  (STANDING):  amLODIPine   Tablet 10 milliGRAM(s) Oral daily  chlorhexidine 4% Liquid 1 Application(s) Topical <User Schedule>  fluconAZOLE   Tablet 400 milliGRAM(s) Oral daily  linezolid    Tablet 600 milliGRAM(s) Oral every 12 hours  methadone    Tablet 10 milliGRAM(s) Oral daily  nicotine - 21 mG/24Hr(s) Patch 1 patch Transdermal daily  pantoprazole    Tablet 40 milliGRAM(s) Oral before breakfast    MEDICATIONS  (PRN):  acetaminophen   Tablet .. 650 milliGRAM(s) Oral every 6 hours PRN Mild Pain (1 - 3)  methadone    Tablet 5 milliGRAM(s) Oral daily PRN breakthrough opiate cravings / withdrawal symptoms  zolpidem 10 milliGRAM(s) Oral at bedtime PRN Insomnia

## 2019-06-28 NOTE — PROGRESS NOTE ADULT - ASSESSMENT
h/0 I and d left forearm, wound closing with local wound care  -continue present local wound care until closed

## 2019-06-28 NOTE — PROGRESS NOTE ADULT - PROBLEM SELECTOR PLAN 1
s/p I and D by plastics. healing well   Blood cultures positive for Candida and MRSA,   s/p ALEJANDRO  and no evidence of vegetation  vanco 1g q12 stop on 4/28-however on hold as patient does not have IV access  Cr is 1.28-  stable  On zyvox for now as IV is not functioning.

## 2019-06-28 NOTE — PROGRESS NOTE ADULT - ASSESSMENT
intravenous drug addict realated  suppurative thrombo phlebitis   cns candida   micro final blood culture is cns   as per micro pcr was methicillin resitant staph aureus though grew cns   ALEJANDRO NEGATIVE   has no access   finish course with zyvox   diflucan 400 daily

## 2019-06-28 NOTE — PROGRESS NOTE ADULT - SUBJECTIVE AND OBJECTIVE BOX
( x ) No complaints (  ) Complains of:     T(F): 98.7 (06-28-19 @ 17:16), Max: 98.7 (06-28-19 @ 17:16)  HR: 83 (06-28-19 @ 17:16) (73 - 84)  BP: 106/79 (06-28-19 @ 17:16) (106/79 - 127/91)  RR: 16 (06-28-19 @ 17:16) (16 - 17)  SpO2: 99% (06-28-19 @ 17:16) (97% - 99%)        Wound Location 1:  left forearm, elogated, red granulation, minor slough, 4.5 cm L, widest width .8cm, depth .2 cm. Periwound intact                         06-10 [id number] 5.0            Procedure Performed:  (  )Yes     ( x )No  Name of Procedure:  (  )debridement    (  )I&D    (  )Other:  (  )partial thickness     (  )full thickness     (  )subcutaneous     (  )muscle/tendon     (  )bone  (  )sharp     (  )surgical

## 2019-06-28 NOTE — PROGRESS NOTE ADULT - ASSESSMENT
39 years old male with history of heroine abuse with cellultis failed after one days of outpatient oral antibiotics for admission MRSA abscess and bacteremia and fungemia. Pt remains afebrile abd normal wbc, WIll need IV tx till 6/28 and likely be hospitalized to complete tx. Pt agrees to continue methadone as an outpatient. Will be discharged on Monday 7/1 to follow up at a methadone outpatient center.

## 2019-06-28 NOTE — PROGRESS NOTE ADULT - SUBJECTIVE AND OBJECTIVE BOX
HPI:  39 year old male presents today for follow up, pt presents with a draining wound on his left forearm, pt was seen yesterday but did not want to stay, he did want to try oral antibiotics, he does admit to heroin use prior to his symptoms, he did have fever on Saturday and Sunday then swelling which worsened, he denies tenderness +drainage of pus today - denies local pain - OF note -## he does not want anybody especially his fiance to know about his drug use## (06 Jun 2019 12:25)  here suppurative thrombophlebitis from candida and methicillin resitant staph aureus and cns   ALEJANDRO NEGATIVE   day 21 of antibiotics   no iv access on po zyvox     Allergies    No Known Allergies    Intolerances        MEDICATIONS  (STANDING):  amLODIPine   Tablet 10 milliGRAM(s) Oral daily  chlorhexidine 4% Liquid 1 Application(s) Topical <User Schedule>  fluconAZOLE   Tablet 400 milliGRAM(s) Oral daily  linezolid    Tablet 600 milliGRAM(s) Oral every 12 hours  methadone    Tablet 10 milliGRAM(s) Oral daily  nicotine - 21 mG/24Hr(s) Patch 1 patch Transdermal daily  pantoprazole    Tablet 40 milliGRAM(s) Oral before breakfast    MEDICATIONS  (PRN):  acetaminophen   Tablet .. 650 milliGRAM(s) Oral every 6 hours PRN Mild Pain (1 - 3)  methadone    Tablet 5 milliGRAM(s) Oral daily PRN breakthrough opiate cravings / withdrawal symptoms  zolpidem 10 milliGRAM(s) Oral at bedtime PRN Insomnia      REVIEW OF SYSTEMS:    CONSTITUTIONAL: No fever, chills, weight loss, or fatigue  HEENT: No sore throat, runny nose, ear ache  RESPIRATORY: No cough, wheezing, No shortness of breath  CARDIOVASCULAR: No chest pain, palpitations, dizziness  GASTROINTESTINAL: No abdominal pain. No nausea, vomiting, diarrhea  GENITOURINARY: No dysuria, increase frequency, hematuria, or incontinence  NEUROLOGICAL: No headaches, memory loss, loss of strength, numbness, or tremors, no weakness  EXTREMITY: No pedal edema BLE  SKIN: No itching, burning, rashes, or lesions   apprehensive about discharge   \wants to stay away from drugs   VITAL SIGNS:  T(C): 37.1 (06-28-19 @ 17:16), Max: 37.1 (06-28-19 @ 17:16)  T(F): 98.7 (06-28-19 @ 17:16), Max: 98.7 (06-28-19 @ 17:16)  HR: 83 (06-28-19 @ 17:16) (73 - 84)  BP: 106/79 (06-28-19 @ 17:16) (106/79 - 127/91)  RR: 16 (06-28-19 @ 17:16) (16 - 17)  SpO2: 99% (06-28-19 @ 17:16) (97% - 99%)  Wt(kg): --    PHYSICAL EXAM:    GENERAL: not in any distress  HEENT: Neck is supple, normocephalic, atraumatic   CHEST/LUNG: Clear to auscultation bilaterally; No rales, rhonchi, wheezing  HEART: Regular rate and rhythm; No murmurs, rubs, or gallops  ABDOMEN: Soft, Nontender, Nondistended; Bowel sounds present, no rebound   EXTREMITIES:  2+ Peripheral Pulses, No clubbing, cyanosis, or edema  arm is status quo   resolving phlebitis   BACK: no pressor sore   NERVOUS SYSTEM:  Alert & Oriented X3, Good concentration  PSYCH: normal affect     LABS:     06-27    136  |  103  |  29<H>  ----------------------------<  105<H>  4.0   |  25  |  1.28    Ca    9.5      27 Jun 2019 15:51                                              Radiology:

## 2019-06-29 PROCEDURE — 99232 SBSQ HOSP IP/OBS MODERATE 35: CPT

## 2019-06-29 RX ADMIN — AMLODIPINE BESYLATE 10 MILLIGRAM(S): 2.5 TABLET ORAL at 05:55

## 2019-06-29 RX ADMIN — Medication 1 PATCH: at 12:20

## 2019-06-29 RX ADMIN — CHLORHEXIDINE GLUCONATE 1 APPLICATION(S): 213 SOLUTION TOPICAL at 05:55

## 2019-06-29 RX ADMIN — Medication 1 PATCH: at 23:55

## 2019-06-29 RX ADMIN — LINEZOLID 600 MILLIGRAM(S): 600 INJECTION, SOLUTION INTRAVENOUS at 18:11

## 2019-06-29 RX ADMIN — LINEZOLID 600 MILLIGRAM(S): 600 INJECTION, SOLUTION INTRAVENOUS at 05:55

## 2019-06-29 RX ADMIN — METHADONE HYDROCHLORIDE 5 MILLIGRAM(S): 40 TABLET ORAL at 05:59

## 2019-06-29 RX ADMIN — Medication 1 PATCH: at 08:24

## 2019-06-29 RX ADMIN — METHADONE HYDROCHLORIDE 10 MILLIGRAM(S): 40 TABLET ORAL at 12:19

## 2019-06-29 RX ADMIN — ZOLPIDEM TARTRATE 10 MILLIGRAM(S): 10 TABLET ORAL at 22:10

## 2019-06-29 RX ADMIN — FLUCONAZOLE 400 MILLIGRAM(S): 150 TABLET ORAL at 12:20

## 2019-06-29 RX ADMIN — PANTOPRAZOLE SODIUM 40 MILLIGRAM(S): 20 TABLET, DELAYED RELEASE ORAL at 08:57

## 2019-06-29 RX ADMIN — Medication 1 PATCH: at 12:11

## 2019-06-29 NOTE — PROGRESS NOTE ADULT - ASSESSMENT
intravenous drug addict realated  suppurative thrombo phlebitis   cns candida   micro final blood culture is cns   as per micro pcr was methicillin resitant staph aureus though grew cns   ALEJANDRO NEGATIVE   has no access   finish course with zyvox   diflucan 400 daily   discharge plan monday with no diflucan or zyvox

## 2019-06-29 NOTE — PROGRESS NOTE ADULT - SUBJECTIVE AND OBJECTIVE BOX
Patient is a 39y old  Male who presents with a chief complaint of cellultis (28 Jun 2019 18:29)      INTERVAL HPI/ OVERNIGHT EVENTS: Pt was seen and examined at bedside today, No significant overnight events     MEDICATIONS  (STANDING):  amLODIPine   Tablet 10 milliGRAM(s) Oral daily  chlorhexidine 4% Liquid 1 Application(s) Topical <User Schedule>  fluconAZOLE   Tablet 400 milliGRAM(s) Oral daily  linezolid    Tablet 600 milliGRAM(s) Oral every 12 hours  methadone    Tablet 10 milliGRAM(s) Oral daily  nicotine - 21 mG/24Hr(s) Patch 1 patch Transdermal daily  pantoprazole    Tablet 40 milliGRAM(s) Oral before breakfast    MEDICATIONS  (PRN):  acetaminophen   Tablet .. 650 milliGRAM(s) Oral every 6 hours PRN Mild Pain (1 - 3)  methadone    Tablet 5 milliGRAM(s) Oral daily PRN breakthrough opiate cravings / withdrawal symptoms  zolpidem 10 milliGRAM(s) Oral at bedtime PRN Insomnia      Allergies    No Known Allergies    Intolerances        REVIEW OF SYSTEMS:    Unable to examine due to [ ] Encephalopathy [ ] Advanced Dementia [ ] Expressive Aphasia [ ] Non-verbal patient    CONSTITUTIONAL: No fever, NO generalized weakness/Fatigue, No weight loss  EYES: No eye pain, visual disturbances, or discharge  ENMT:  No difficulty hearing, tinnitus, vertigo; No sinus or throat pain  NECK: No pain or stiffness  RESPIRATORY: No shortness of breath,  cough, wheezing, sputum or hemoptysis   CARDIOVASCULAR: No chest pain, palpitations, or leg swelling  GASTROINTESTINAL: No abdominal pain. No nausea, vomiting, diarrhea or constipation. No melena or hematochezia.  GENITOURINARY: No dysuria, frequency, hematuria, or incontinence  NEUROLOGICAL: No headaches, Dizziness, memory loss, loss of strength, numbness, or tremors  SKIN: No itching, burning, rashes, or lesions   MUSCULOSKELETAL: No joint pain or swelling; No muscle, back, or extremity pain  PSYCHIATRIC: No depression, anxiety, mood swings, or difficulty sleeping  HEME/LYMPH: No easy bruising, or bleeding gums      Vital Signs Last 24 Hrs  T(C): 36.4 (29 Jun 2019 05:23), Max: 37.1 (28 Jun 2019 17:16)  T(F): 97.6 (29 Jun 2019 05:23), Max: 98.7 (28 Jun 2019 17:16)  HR: 76 (29 Jun 2019 05:23) (76 - 84)  BP: 112/65 (29 Jun 2019 05:23) (104/60 - 112/65)  BP(mean): --  RR: 16 (29 Jun 2019 05:23) (16 - 16)  SpO2: 99% (29 Jun 2019 05:23) (98% - 99%)    PHYSICAL EXAM:  GENERAL: NAD, well-developed, well-groomed  HEAD:  Atraumatic, Normocephalic  EYES: conjunctiva and sclera clear  ENMT: Moist mucous membranes  NECK: Supple, No JVD, Normal thyroid  CHEST/LUNG: Clear to Auscultation bilaterally; No rales, rhonchi, wheezing, or rubs  HEART: Regular rate and rhythm; No murmurs, rubs, or gallops  ABDOMEN: Soft, Nontender, Nondistended; Bowel sounds present  EXTREMITIES:  2+ Peripheral Pulses, No clubbing, cyanosis, or edema  SKIN: No rashes or lesions  NERVOUS SYSTEM:  Alert & Oriented X3, Good concentration; Motor Strength 5/5 B/L upper and lower extremities    LABS:    06-27    136  |  103  |  29<H>  ----------------------------<  105<H>  4.0   |  25  |  1.28    Ca    9.5      27 Jun 2019 15:51          CAPILLARY BLOOD GLUCOSE              RADIOLOGY & ADDITIONAL TESTS:          Imaging Personally Reviewed:  [ ] YES  [ ] NO    Consultant(s) Notes Reviewed:  [ ] YES  [ ] NO    Care Discussed with Consultants/Other Providers [x ] YES  [ ] NO

## 2019-06-29 NOTE — PROGRESS NOTE ADULT - PROBLEM SELECTOR PLAN 1
s/p I&D by plastics. healing well   ID and Surgery are following.   Blood cultures positive for Candida and MRSA,   s/p ALEJANDRO and no evidence of vegetation  cont w/ zyvox can be d/kal prior to d/c

## 2019-06-29 NOTE — PROGRESS NOTE ADULT - SUBJECTIVE AND OBJECTIVE BOX
39 year old male presents today for follow up, pt presents with a draining wound on his left forearm, pt was seen yesterday but did not want to stay, he did want to try oral antibiotics, he does admit to heroin use prior to his symptoms, he did have fever on Saturday and  then swelling which worsened, he denies tenderness +drainage of pus today - denies local pain - OF note -## he does not want anybody especially his fiance to know about his drug use## (2019 12:25)      Chief Complaint:  Patient is a 39y old  Male who presents with a chief complaint of cellultis (10 David 2019 19:57)      Review of Systems:    General:  No wt loss, fevers, chills, night sweats  Eyes:  Good vision, no reported pain  ENT:  No sore throat, pain, runny nose, dysphagia  CV:  No pain, palpitations, hypo/hypertension  Resp:  No dyspnea, cough, tachypnea, wheezing  GI:  No pain, nausea, vomiting, diarrhea, constipation  :  No pain, bleeding, incontinence, nocturia           Social History/Family History  SOCHX:   tobacco,  -  alcohol    FMHX: FA/MO  - contributory       Discussed with:  PMD, Family    Physical Exam:    Vital Signs:  Vital Signs Last 24 Hrs  T(C): 36.2 (2019 05:44), Max: 36.5 (10 David 2019 11:24)  T(F): 97.2 (2019 05:44), Max: 97.7 (10 David 2019 11:24)  HR: 66 (2019 05:44) (66 - 100)  BP: 155/98 (2019 05:44) (152/83 - 181/97)  BP(mean): --  RR: 18 (2019 05:44) (17 - 18)  SpO2: 98% (2019 05:44) (98% - 100%)  Daily     Daily Weight in k.4 (2019 05:44)  I&O's Summary    2019 07:01  -  10 David 2019 07:00  --------------------------------------------------------  IN: 2030 mL / OUT: 0 mL / NET: 2030 mL          Chest:  Full & symmetric excursion, no increased effort, breath sounds clear  Cardiovascular:  Regular rhythm, S1, S2, no murmur/rub/S3/S4,  Abdomen:  Soft, non-tender, non-distended, normoactive bowel sounds, no HSM        Assessment:  Bacteremia  TTE noted  ALEJANDRO no vegetation  Patient requires long term ABX IV, ZYVOX to DC on DC  Psych noted

## 2019-06-29 NOTE — PROGRESS NOTE ADULT - SUBJECTIVE AND OBJECTIVE BOX
39 year old male presents today for follow up, pt presents with a draining wound on his left forearm, pt was seen yesterday but did not want to stay, he did want to try oral antibiotics, he does admit to heroin use prior to his symptoms, he did have fever on Saturday and Sunday then swelling which worsened, he denies tenderness +drainage of pus today - denies local pain - OF note -## he does not want anybody especially his fiance to know about his drug use## (06 Jun 2019 12:25)  here suppurative thrombophlebitis from candida and methicillin resitant staph aureus and cns   ALEJANDRO NEGATIVE   day 21 of antibiotics   no iv access on po zyvox     MEDICATIONS  (STANDING):  amLODIPine   Tablet 10 milliGRAM(s) Oral daily  chlorhexidine 4% Liquid 1 Application(s) Topical <User Schedule>  fluconAZOLE   Tablet 400 milliGRAM(s) Oral daily  linezolid    Tablet 600 milliGRAM(s) Oral every 12 hours  methadone    Tablet 10 milliGRAM(s) Oral daily  nicotine - 21 mG/24Hr(s) Patch 1 patch Transdermal daily  pantoprazole    Tablet 40 milliGRAM(s) Oral before breakfast    MEDICATIONS  (PRN):  acetaminophen   Tablet .. 650 milliGRAM(s) Oral every 6 hours PRN Mild Pain (1 - 3)  methadone    Tablet 5 milliGRAM(s) Oral daily PRN breakthrough opiate cravings / withdrawal symptoms  zolpidem 10 milliGRAM(s) Oral at bedtime PRN Insomnia      REVIEW OF SYSTEMS:    feels well    VITAL SIGNS:  T(C): 36.9 (06-29-19 @ 11:26), Max: 37.1 (06-28-19 @ 17:16)  T(F): 98.5 (06-29-19 @ 11:26), Max: 98.7 (06-28-19 @ 17:16)  HR: 90 (06-29-19 @ 11:26) (76 - 90)  BP: 137/91 (06-29-19 @ 11:26) (104/60 - 137/91)  RR: 16 (06-29-19 @ 11:26) (16 - 16)  SpO2: 100% (06-29-19 @ 11:26) (98% - 100%)  Wt(kg): --    PHYSICAL EXAM:    GENERAL: not in any distress  HEENT: Neck is supple, normocephalic, atraumatic   CHEST/LUNG: Clear to auscultation bilaterally; No rales, rhonchi, wheezing  HEART: Regular rate and rhythm; No murmurs, rubs, or gallops  ABDOMEN: Soft, Nontender, Nondistended; Bowel sounds present, no rebound   EXTREMITIES:  2+ Peripheral Pulses, No clubbing, cyanosis, or edema  wound closing   plastics noted  BACK: no pressor sore   NERVOUS SYSTEM:  Alert & Oriented X3, Good concentration      LABS:     06-27    136  |  103  |  29<H>  ----------------------------<  105<H>  4.0   |  25  |  1.28    Ca    9.5      27 Jun 2019 15:51                                              Radiology:

## 2019-06-30 PROCEDURE — 99232 SBSQ HOSP IP/OBS MODERATE 35: CPT

## 2019-06-30 RX ORDER — METHADONE HYDROCHLORIDE 40 MG/1
1 TABLET ORAL
Qty: 0 | Refills: 0 | DISCHARGE
Start: 2019-06-30

## 2019-06-30 RX ORDER — AMLODIPINE BESYLATE 2.5 MG/1
1 TABLET ORAL
Qty: 30 | Refills: 0
Start: 2019-06-30 | End: 2019-07-29

## 2019-06-30 RX ORDER — NICOTINE POLACRILEX 2 MG
1 GUM BUCCAL
Qty: 15 | Refills: 0
Start: 2019-06-30

## 2019-06-30 RX ADMIN — FLUCONAZOLE 400 MILLIGRAM(S): 150 TABLET ORAL at 11:49

## 2019-06-30 RX ADMIN — Medication 1 PATCH: at 19:30

## 2019-06-30 RX ADMIN — LINEZOLID 600 MILLIGRAM(S): 600 INJECTION, SOLUTION INTRAVENOUS at 18:45

## 2019-06-30 RX ADMIN — Medication 1 PATCH: at 08:03

## 2019-06-30 RX ADMIN — METHADONE HYDROCHLORIDE 5 MILLIGRAM(S): 40 TABLET ORAL at 08:16

## 2019-06-30 RX ADMIN — CHLORHEXIDINE GLUCONATE 1 APPLICATION(S): 213 SOLUTION TOPICAL at 05:02

## 2019-06-30 RX ADMIN — AMLODIPINE BESYLATE 10 MILLIGRAM(S): 2.5 TABLET ORAL at 05:02

## 2019-06-30 RX ADMIN — Medication 1 PATCH: at 11:49

## 2019-06-30 RX ADMIN — LINEZOLID 600 MILLIGRAM(S): 600 INJECTION, SOLUTION INTRAVENOUS at 05:02

## 2019-06-30 RX ADMIN — METHADONE HYDROCHLORIDE 10 MILLIGRAM(S): 40 TABLET ORAL at 11:49

## 2019-06-30 RX ADMIN — Medication 1 PATCH: at 11:53

## 2019-06-30 RX ADMIN — PANTOPRAZOLE SODIUM 40 MILLIGRAM(S): 20 TABLET, DELAYED RELEASE ORAL at 08:17

## 2019-06-30 RX ADMIN — ZOLPIDEM TARTRATE 10 MILLIGRAM(S): 10 TABLET ORAL at 21:58

## 2019-06-30 NOTE — PROGRESS NOTE ADULT - PROBLEM SELECTOR PROBLEM 5
Hypertension, unspecified type
Tobacco dependence
Hypertension, unspecified type
Tobacco dependence
Hypertension, unspecified type
Tobacco dependence

## 2019-06-30 NOTE — PROGRESS NOTE ADULT - SUBJECTIVE AND OBJECTIVE BOX
Patient is a 39y old  Male who presents with a chief complaint of cellultis (29 Jun 2019 12:41)      INTERVAL HPI/ OVERNIGHT EVENTS: Pt was seen and examined at bedside today, No significant overnight events, pt offers no complaints.      MEDICATIONS  (STANDING):  amLODIPine   Tablet 10 milliGRAM(s) Oral daily  chlorhexidine 4% Liquid 1 Application(s) Topical <User Schedule>  fluconAZOLE   Tablet 400 milliGRAM(s) Oral daily  linezolid    Tablet 600 milliGRAM(s) Oral every 12 hours  methadone    Tablet 10 milliGRAM(s) Oral daily  nicotine - 21 mG/24Hr(s) Patch 1 patch Transdermal daily  pantoprazole    Tablet 40 milliGRAM(s) Oral before breakfast    MEDICATIONS  (PRN):  acetaminophen   Tablet .. 650 milliGRAM(s) Oral every 6 hours PRN Mild Pain (1 - 3)  methadone    Tablet 5 milliGRAM(s) Oral daily PRN breakthrough opiate cravings / withdrawal symptoms  zolpidem 10 milliGRAM(s) Oral at bedtime PRN Insomnia      Allergies    No Known Allergies    Intolerances        REVIEW OF SYSTEMS:    Unable to examine due to [ ] Encephalopathy [ ] Advanced Dementia [ ] Expressive Aphasia [ ] Non-verbal patient    CONSTITUTIONAL: No fever, NO generalized weakness/Fatigue, No weight loss  EYES: No eye pain, visual disturbances, or discharge  ENMT:  No difficulty hearing, tinnitus, vertigo; No sinus or throat pain  NECK: No pain or stiffness  RESPIRATORY: No shortness of breath,  cough, wheezing, sputum or hemoptysis   CARDIOVASCULAR: No chest pain, palpitations, or leg swelling  GASTROINTESTINAL: No abdominal pain. No nausea, vomiting, diarrhea or constipation. No melena or hematochezia.  GENITOURINARY: No dysuria, frequency, hematuria, or incontinence  NEUROLOGICAL: No headaches, Dizziness, memory loss, loss of strength, numbness, or tremors  SKIN: No itching, burning, rashes, or lesions   MUSCULOSKELETAL: No joint pain or swelling; No muscle, back, or extremity pain  PSYCHIATRIC: No depression, anxiety, mood swings, or difficulty sleeping  HEME/LYMPH: No easy bruising, or bleeding gums      Vital Signs Last 24 Hrs  T(C): 36.2 (30 Jun 2019 04:48), Max: 36.9 (29 Jun 2019 11:26)  T(F): 97.1 (30 Jun 2019 04:48), Max: 98.5 (29 Jun 2019 11:26)  HR: 74 (30 Jun 2019 04:48) (72 - 108)  BP: 111/73 (30 Jun 2019 04:48) (111/73 - 137/91)  BP(mean): --  RR: 17 (30 Jun 2019 04:48) (16 - 17)  SpO2: 98% (30 Jun 2019 04:48) (97% - 100%)    PHYSICAL EXAM:  GENERAL: NAD, well-developed, well-groomed  HEAD:  Atraumatic, Normocephalic  EYES: conjunctiva and sclera clear  ENMT: Moist mucous membranes  NECK: Supple, No JVD, Normal thyroid  CHEST/LUNG: Clear to Auscultation bilaterally; No rales, rhonchi, wheezing, or rubs  HEART: Regular rate and rhythm; No murmurs, rubs, or gallops  ABDOMEN: Soft, Nontender, Nondistended; Bowel sounds present  EXTREMITIES:  2+ Peripheral Pulses, No clubbing, cyanosis, or edema  SKIN: left forearm wound closing   NERVOUS SYSTEM:  Alert & Oriented X3, Good concentration; Motor Strength 5/5 B/L upper and lower extremities    LABS:              CAPILLARY BLOOD GLUCOSE              RADIOLOGY & ADDITIONAL TESTS:          Imaging Personally Reviewed:  [ ] YES  [ ] NO    Consultant(s) Notes Reviewed:  [ ] YES  [ ] NO    Care Discussed with Consultants/Other Providers [x ] YES  [ ] NO

## 2019-06-30 NOTE — PROGRESS NOTE ADULT - PROBLEM SELECTOR PROBLEM 4
Fungemia
Substance abuse
Substance abuse
Fungemia
Substance abuse
Substance abuse
Fungemia
Substance abuse
Tobacco dependence
Substance abuse

## 2019-06-30 NOTE — PROGRESS NOTE ADULT - PROBLEM SELECTOR PLAN 7
stool occult positive,  H/H stable, Iron studies wnl  Outpt scoping recommended.
checked stool negative,  checked again positive,   consulted   see their note rec oupt endo   checked iron studies   consulted heme-anemia likely secondary to inflammation-Will need outpatient follow up.
checked stool negative   checked iron studies   will consult heme
checked stool negative,  checked again positive,   consulted   see their note rec oupt endo     checked iron studies   consulted heme
checked stool negative,  checked again positive,   consulted   see their note rec oupt endo   checked iron studies   consulted heme-anemia likely secondary to inflammation-Will need outpatient follow up.
checked stool negative,  checked again positive,   consulted   see their note rec oupt endo   checked iron studies   consulted heme-anemia likely secondary to inflammation-Will need outpatient follow up.
checked stool negative,  checked again positive,   consulted   see their note recc oupt endo     checked iron studies   consulted heme
checked stool negative,  checked again positive,  will consult GI    checked iron studies   consulted heme
stool occult positive,  H/H stable, Iron studies wnl  Outpt scoping recommended.
checked stool negative   checked iron studies   will consult heme
checked stool negative,  checked again positive,   consulted   see their note rec oupt endo   checked iron studies   consulted heme-anemia likely secondary to inflammation-Will need outpatient follow up.

## 2019-06-30 NOTE — PROGRESS NOTE ADULT - PROBLEM SELECTOR PROBLEM 6
Abscess of upper limb
Edema of lower extremity
Abscess of upper limb
Edema of lower extremity

## 2019-06-30 NOTE — PROGRESS NOTE ADULT - PROBLEM SELECTOR PLAN 8
resolved
resolved
cr trending down   2/2 to vanco toxicity   ajust vanco  hold ace for now
cr trending down   2/2 to vanco toxicity   hold ace for now
resolved
cr trending down   2/2 to vanco toxicity   ajust vanco

## 2019-06-30 NOTE — PROGRESS NOTE ADULT - PROBLEM SELECTOR PROBLEM 8
ATN (acute tubular necrosis)

## 2019-06-30 NOTE — PROGRESS NOTE ADULT - SUBJECTIVE AND OBJECTIVE BOX
39 year old male presents today for follow up, pt presents with a draining wound on his left forearm, pt was seen yesterday but did not want to stay, he did want to try oral antibiotics, he does admit to heroin use prior to his symptoms, he did have fever on Saturday and  then swelling which worsened, he denies tenderness +drainage of pus today - denies local pain - OF note -## he does not want anybody especially his fiance to know about his drug use## (2019 12:25)      Chief Complaint:  Patient is a 39y old  Male who presents with a chief complaint of cellultis (10 David 2019 19:57)      Review of Systems:    General:  No wt loss, fevers, chills, night sweats  Eyes:  Good vision, no reported pain  ENT:  No sore throat, pain, runny nose, dysphagia  CV:  No pain, palpitations, hypo/hypertension  Resp:  No dyspnea, cough, tachypnea, wheezing  GI:  No pain, nausea, vomiting, diarrhea, constipation  :  No pain, bleeding, incontinence, nocturia           Social History/Family History  SOCHX:   tobacco,  -  alcohol    FMHX: FA/MO  - contributory       Discussed with:  PMD, Family    Physical Exam:    Vital Signs:  Vital Signs Last 24 Hrs  T(C): 36.2 (2019 05:44), Max: 36.5 (10 David 2019 11:24)  T(F): 97.2 (2019 05:44), Max: 97.7 (10 David 2019 11:24)  HR: 66 (2019 05:44) (66 - 100)  BP: 155/98 (2019 05:44) (152/83 - 181/97)  BP(mean): --  RR: 18 (2019 05:44) (17 - 18)  SpO2: 98% (2019 05:44) (98% - 100%)  Daily     Daily Weight in k.4 (2019 05:44)  I&O's Summary    2019 07:01  -  10 David 2019 07:00  --------------------------------------------------------  IN: 2030 mL / OUT: 0 mL / NET: 2030 mL          Chest:  Full & symmetric excursion, no increased effort, breath sounds clear  Cardiovascular:  Regular rhythm, S1, S2, no murmur/rub/S3/S4,  Abdomen:  Soft, non-tender, non-distended, normoactive bowel sounds, no HSM        Assessment:  Bacteremia  TTE noted  ALEJANDRO no vegetation  Patient requires long term ABX IV, DC ZYVOX on DC  Psych noted

## 2019-06-30 NOTE — PROGRESS NOTE ADULT - PROBLEM SELECTOR PLAN 3
High blood pressure, with proteinuria on UA,   BP stable   cont amlodipine 10mg daily
High blood pressure, with proteinuria on UA,    continue with ACEI and Norvasc. renal indices wnl . uptitrate as needed
High blood pressure, with proteinuria on UA,   BP stable   cont amlodipine 10mg daily
High blood pressure, with proteinuria on UA, will start patient on low dose ACE.
High blood pressure, with proteinuria on UA,    BP stable   ACEI on hold    can resume at discharge if needed.
High blood pressure, with proteinuria on UA,    continue with ACEI and Norvasc. renal indices wnl .  increased ACEI to 40 starting on 6/15/19
High blood pressure, with proteinuria on UA,    continue with ACEI and Norvasc. renal indices wnl . uptitrate as needed
High blood pressure, with proteinuria on UA,    continue with ACEI and Norvasc. renal indices wnl . uptitrate as needed
High blood pressure, with proteinuria on UA,    continue with ACEI and Norvasc. renal indices wnl .incresaed ACEI to 40 starting in am
High blood pressure, with proteinuria on UA,   BP stable   cont amlodipine 10mg daily
High blood pressure, with proteinuria on UA,   hold ace for now d.t maggie  will consider additions med if needed
High blood pressure, with proteinuria on UA,   hold ace for now d.t maggie  will consider additions med if needed
High blood pressure, with proteinuria on UA, was started  on low dose ACE by my mike dickensue bp still high will add Norvasc 10
nicoderm
no withdrawal symptoms-  pt agrees that he darcy to bachelor party and we will test him for HIV also and rule out IE.
High blood pressure, with proteinuria on UA,   BP stable   cont amlodipine 10mg daily

## 2019-06-30 NOTE — PROGRESS NOTE ADULT - PROBLEM SELECTOR PLAN 2
ID following.   c/w oral Diflucan
On micafungin  ID following.
ID following. c/w oral Diflucan
On Diflucan, ID following.
ID following.   c/w oral Diflucan, d/c in am
ID following. c/w oral Diflucan
ID following. change to oral Diflucan
ID following. change to oral Diflucan
On Diflucan, ID following.
On micafungin  ID following.
On micafungin  ID following. will change to ora Diflucan see above upon discharge
no withdrawal symptoms.   continue to monitor.
will need plastics to do I&d    culture positive in blood for gram pos cocci . surgery called to make aware.
ID following. c/w oral Diflucan

## 2019-06-30 NOTE — PROGRESS NOTE ADULT - PROBLEM SELECTOR PROBLEM 7
Cellulitis of left upper extremity
Anemia, unspecified type

## 2019-06-30 NOTE — PROGRESS NOTE ADULT - PROBLEM SELECTOR PLAN 5
Nicoderm

## 2019-06-30 NOTE — PROGRESS NOTE ADULT - PROBLEM SELECTOR PROBLEM 3
Hypertension, unspecified type
Marijuana smoker
Marijuana smoker
Hypertension, unspecified type
Marijuana smoker
Hypertension, unspecified type
Marijuana smoker
Substance abuse
Tobacco dependence
Marijuana smoker
Hypertension, unspecified type

## 2019-06-30 NOTE — PROGRESS NOTE ADULT - PROBLEM SELECTOR PROBLEM 1
Abscess of upper limb
Anemia
Heroin use disorder, moderate, in controlled environment, dependence
Heroin use disorder, moderate, in controlled environment, dependence
Abscess of upper limb
Anemia
Cellulitis of left upper extremity
Heroin use disorder, moderate, in controlled environment, dependence
Abscess of upper limb
Heroin use disorder, moderate, in controlled environment, dependence
Heroin use disorder, moderate, in controlled environment, dependence
Abscess of upper limb

## 2019-06-30 NOTE — PROGRESS NOTE ADULT - PROVIDER SPECIALTY LIST ADULT
Cardiology
Gastroenterology
Heme/Onc
Hospitalist
Infectious Disease
Plastic Surgery
Cardiology
Hospitalist
Infectious Disease
Heme/Onc
Infectious Disease
Infectious Disease
Hospitalist
Hospitalist
Family Medicine

## 2019-06-30 NOTE — PROGRESS NOTE ADULT - PROBLEM SELECTOR PROBLEM 2
Fungemia
Cocaine abuse
Cocaine abuse
Fungemia
Cocaine abuse
Fungemia
Fungemia
Abscess of upper limb
Fungemia
Substance abuse
Cocaine abuse
Cocaine abuse
Fungemia

## 2019-06-30 NOTE — PROGRESS NOTE ADULT - PROBLEM SELECTOR PLAN 4
was seen by psych and greatly appreciate their consult was started on methadone for withdrawal prn, Klonopin and Ambien prn  pt agreed to decrease methadone to 10mg daily and 5mg PRN and would like to continue methadone and does not wish to start suboxone.    will discuss clinic options with patient-Pts first choice is [facility] methadone clinic.  Plan for DC on Monday 7/1
was seen by psych and greatly appreciate their consult ws started on methadone for withdrawal prn, Klonopin and Ambien prn
no withdrawal symptoms.   continue to monitor.
was seen by psych and greatly appreciate their consult ws started on methadone for withdrawal prn, Klonopin and Ambien prn  pt agreed to decrease methadone to 5mg daily and then to dc on thursday so he can start Suboxone on dc and needs to be off the methadone as per pt. Pt following up with clinic after dc   will discuss clinic options with patient.
nicoderm
no withdrawal symptoms.   continue to monitor.
was seen by psych and greatly appreciate their consult was started on methadone for withdrawal prn, Klonopin and Ambien prn  pt agreed to decrease methadone to 10mg daily and 5mg PRN and would like to continue methadone and does not wish to start suboxone.    will discuss clinic options with patient-Pts first choice is [facility] methadone clinic.  Plan for DC on Monday 7/1
was seen by psych and greatly appreciate their consult was started on methadone for withdrawal prn, Klonopin and Ambien prn  pt agreed to decrease methadone to 10mg daily and 5mg PRN and would like to continue methadone and does not wish to start suboxone.    will discuss clinic options with patient-Pts first choice is [facility] methadone clinic.  Plan for DC on Monday 7/1
was seen by psych and greatly appreciate their consult was started on methadone for withdrawal prn, Klonopin and Ambien prn  pt agreed to decrease methadone to 10mg daily and 5mg PRN and would like to continue methadone and does not wish to start suboxone.    will discuss clinic options with patient-Pts first choice is [facility] methadone clinic.  Plan for DC on Monday
was seen by psych and greatly appreciate their consult ws started on methadone for withdrawal prn, Klonopin and Ambien prn
was seen by psych and greatly appreciate their consult ws started on methadone for withdrawal prn, Klonopin and Ambien prn  pt agreed to decrease methadone to 5mg daily and then to dc on thursday so he can start suboxone on dc and needs to be off the methadone as per pt. Pt following up with clinic after dc
was seen by psych and greatly appreciate their consult ws started on methadone for withdrawal prn, Klonopin and Ambien prn  pt agreed to decrease methadone to 5mg daily and then to dc on thursday so he can start suboxone on dc and needs to be off the methadone as per pt. Pt following up with clinic after dc
was seen by psych and greatly appreciate their consult was started on methadone for withdrawal prn, Klonopin and Ambien prn  pt agreed to decrease methadone to 10mg daily and 5mg PRN and would like to continue methadone and does not wish to start suboxone.    will discuss clinic options with patient-Pts first choice is [facility] methadone clinic.

## 2019-07-01 ENCOUNTER — OUTPATIENT (OUTPATIENT)
Dept: OUTPATIENT SERVICES | Facility: HOSPITAL | Age: 39
LOS: 1 days | End: 2019-07-01
Payer: MEDICAID

## 2019-07-01 VITALS
OXYGEN SATURATION: 98 % | SYSTOLIC BLOOD PRESSURE: 118 MMHG | TEMPERATURE: 97 F | HEART RATE: 80 BPM | RESPIRATION RATE: 17 BRPM | WEIGHT: 187.39 LBS | DIASTOLIC BLOOD PRESSURE: 60 MMHG

## 2019-07-01 PROCEDURE — 99239 HOSP IP/OBS DSCHRG MGMT >30: CPT

## 2019-07-01 PROCEDURE — G9001: CPT

## 2019-07-01 RX ADMIN — METHADONE HYDROCHLORIDE 5 MILLIGRAM(S): 40 TABLET ORAL at 06:17

## 2019-07-01 RX ADMIN — LINEZOLID 600 MILLIGRAM(S): 600 INJECTION, SOLUTION INTRAVENOUS at 06:13

## 2019-07-01 RX ADMIN — AMLODIPINE BESYLATE 10 MILLIGRAM(S): 2.5 TABLET ORAL at 06:13

## 2019-07-05 DIAGNOSIS — F17.200 NICOTINE DEPENDENCE, UNSPECIFIED, UNCOMPLICATED: ICD-10-CM

## 2019-07-05 DIAGNOSIS — L03.114 CELLULITIS OF LEFT UPPER LIMB: ICD-10-CM

## 2019-07-05 DIAGNOSIS — I80.9 PHLEBITIS AND THROMBOPHLEBITIS OF UNSPECIFIED SITE: ICD-10-CM

## 2019-07-05 DIAGNOSIS — T36.8X5A ADVERSE EFFECT OF OTHER SYSTEMIC ANTIBIOTICS, INITIAL ENCOUNTER: ICD-10-CM

## 2019-07-05 DIAGNOSIS — I10 ESSENTIAL (PRIMARY) HYPERTENSION: ICD-10-CM

## 2019-07-05 DIAGNOSIS — L02.414 CUTANEOUS ABSCESS OF LEFT UPPER LIMB: ICD-10-CM

## 2019-07-05 DIAGNOSIS — Z71.89 OTHER SPECIFIED COUNSELING: ICD-10-CM

## 2019-07-05 DIAGNOSIS — R78.81 BACTEREMIA: ICD-10-CM

## 2019-07-05 DIAGNOSIS — N17.0 ACUTE KIDNEY FAILURE WITH TUBULAR NECROSIS: ICD-10-CM

## 2019-07-05 DIAGNOSIS — F11.20 OPIOID DEPENDENCE, UNCOMPLICATED: ICD-10-CM

## 2019-07-05 DIAGNOSIS — B95.62 METHICILLIN RESISTANT STAPHYLOCOCCUS AUREUS INFECTION AS THE CAUSE OF DISEASES CLASSIFIED ELSEWHERE: ICD-10-CM

## 2019-07-05 DIAGNOSIS — D64.9 ANEMIA, UNSPECIFIED: ICD-10-CM

## 2019-07-05 DIAGNOSIS — F14.10 COCAINE ABUSE, UNCOMPLICATED: ICD-10-CM

## 2019-07-05 DIAGNOSIS — B37.2 CANDIDIASIS OF SKIN AND NAIL: ICD-10-CM

## 2019-07-15 LAB
CULTURE RESULTS: SIGNIFICANT CHANGE UP
ORGANISM # SPEC MICROSCOPIC CNT: SIGNIFICANT CHANGE UP

## 2020-10-29 NOTE — BEHAVIORAL HEALTH ASSESSMENT NOTE - NSBHMEDSOTHERFT_PSY_A_CORE
none at this time Schizophreniform  Schizophrenia  MDD with psychosis Schizophreniform  Schizophrenia  MDD with psychosis  Schizoaffective d/o

## 2021-06-24 NOTE — H&P ADULT - HISTORY OF PRESENT ILLNESS
39 year old male presents today for follow up, pt presents with a draining wound on his left forearm, pt was seen yesterday but did not want to stay, he did want to try oral antibiotics, he does admit to heroin use prior to his symptoms, he did have fever on Saturday and Sunday then swelling which worsened, he denies tenderness +drainage 39 year old male presents today for follow up, pt presents with a draining wound on his left forearm, pt was seen yesterday but did not want to stay, he did want to try oral antibiotics, he does admit to heroin use prior to his symptoms, he did have fever on Saturday and Sunday then swelling which worsened, he denies tenderness +drainage of pus today - denies local pain - OF note -## he does not want anybody especially his fiance to know about his drug use##

## 2022-11-06 ENCOUNTER — EMERGENCY (EMERGENCY)
Facility: HOSPITAL | Age: 42
LOS: 0 days | Discharge: HOME | End: 2022-11-06
Attending: STUDENT IN AN ORGANIZED HEALTH CARE EDUCATION/TRAINING PROGRAM

## 2022-11-06 VITALS
TEMPERATURE: 98 F | HEART RATE: 76 BPM | DIASTOLIC BLOOD PRESSURE: 84 MMHG | OXYGEN SATURATION: 98 % | SYSTOLIC BLOOD PRESSURE: 169 MMHG | RESPIRATION RATE: 16 BRPM

## 2022-11-06 VITALS
RESPIRATION RATE: 18 BRPM | WEIGHT: 199.96 LBS | HEIGHT: 68 IN | SYSTOLIC BLOOD PRESSURE: 130 MMHG | DIASTOLIC BLOOD PRESSURE: 84 MMHG | TEMPERATURE: 98 F | OXYGEN SATURATION: 96 % | HEART RATE: 77 BPM

## 2022-11-06 DIAGNOSIS — I10 ESSENTIAL (PRIMARY) HYPERTENSION: ICD-10-CM

## 2022-11-06 DIAGNOSIS — M25.562 PAIN IN LEFT KNEE: ICD-10-CM
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
## 2022-11-06 DIAGNOSIS — S80.212A ABRASION, LEFT KNEE, INITIAL ENCOUNTER: ICD-10-CM
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
## 2022-11-06 DIAGNOSIS — Z72.89 OTHER PROBLEMS RELATED TO LIFESTYLE: ICD-10-CM

## 2022-11-06 DIAGNOSIS — S80.12XA CONTUSION OF LEFT LOWER LEG, INITIAL ENCOUNTER: ICD-10-CM

## 2022-11-06 DIAGNOSIS — Z23 ENCOUNTER FOR IMMUNIZATION: ICD-10-CM
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
## 2022-11-06 DIAGNOSIS — M79.89 OTHER SPECIFIED SOFT TISSUE DISORDERS: ICD-10-CM
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
## 2022-11-06 LAB
ALBUMIN SERPL ELPH-MCNC: 3.1 G/DL — LOW (ref 3.3–5)
ALP SERPL-CCNC: 104 U/L — SIGNIFICANT CHANGE UP (ref 40–120)
ALT FLD-CCNC: 24 U/L — SIGNIFICANT CHANGE UP (ref 12–78)
ANION GAP SERPL CALC-SCNC: 6 MMOL/L — SIGNIFICANT CHANGE UP (ref 5–17)
APTT BLD: 32.4 SEC — SIGNIFICANT CHANGE UP (ref 27.5–35.5)
AST SERPL-CCNC: 14 U/L — LOW (ref 15–37)
BASOPHILS # BLD AUTO: 0.06 K/UL — SIGNIFICANT CHANGE UP (ref 0–0.2)
BASOPHILS NFR BLD AUTO: 0.6 % — SIGNIFICANT CHANGE UP (ref 0–2)
BILIRUB SERPL-MCNC: 0.5 MG/DL — SIGNIFICANT CHANGE UP (ref 0.2–1.2)
BLD GP AB SCN SERPL QL: SIGNIFICANT CHANGE UP
BUN SERPL-MCNC: 14 MG/DL — SIGNIFICANT CHANGE UP (ref 7–23)
CALCIUM SERPL-MCNC: 8.7 MG/DL — SIGNIFICANT CHANGE UP (ref 8.5–10.1)
CHLORIDE SERPL-SCNC: 106 MMOL/L — SIGNIFICANT CHANGE UP (ref 96–108)
CK SERPL-CCNC: 74 U/L — SIGNIFICANT CHANGE UP (ref 26–308)
CO2 SERPL-SCNC: 27 MMOL/L — SIGNIFICANT CHANGE UP (ref 22–31)
CREAT SERPL-MCNC: 0.85 MG/DL — SIGNIFICANT CHANGE UP (ref 0.5–1.3)
CRP SERPL-MCNC: 45 MG/L — HIGH
EGFR: 111 ML/MIN/1.73M2 — SIGNIFICANT CHANGE UP
EOSINOPHIL # BLD AUTO: 0.2 K/UL — SIGNIFICANT CHANGE UP (ref 0–0.5)
EOSINOPHIL NFR BLD AUTO: 2.1 % — SIGNIFICANT CHANGE UP (ref 0–6)
ERYTHROCYTE [SEDIMENTATION RATE] IN BLOOD: 23 MM/HR — HIGH (ref 0–15)
ETHANOL SERPL-MCNC: <10 MG/DL — SIGNIFICANT CHANGE UP (ref 0–10)
GLUCOSE SERPL-MCNC: 91 MG/DL — SIGNIFICANT CHANGE UP (ref 70–99)
HCT VFR BLD CALC: 39.5 % — SIGNIFICANT CHANGE UP (ref 39–50)
HGB BLD-MCNC: 13.1 G/DL — SIGNIFICANT CHANGE UP (ref 13–17)
IMM GRANULOCYTES NFR BLD AUTO: 0.6 % — SIGNIFICANT CHANGE UP (ref 0–0.9)
INR BLD: 1.21 RATIO — HIGH (ref 0.88–1.16)
LYMPHOCYTES # BLD AUTO: 0.98 K/UL — LOW (ref 1–3.3)
LYMPHOCYTES # BLD AUTO: 10.5 % — LOW (ref 13–44)
MCHC RBC-ENTMCNC: 27.8 PG — SIGNIFICANT CHANGE UP (ref 27–34)
MCHC RBC-ENTMCNC: 33.2 G/DL — SIGNIFICANT CHANGE UP (ref 32–36)
MCV RBC AUTO: 83.9 FL — SIGNIFICANT CHANGE UP (ref 80–100)
MONOCYTES # BLD AUTO: 0.83 K/UL — SIGNIFICANT CHANGE UP (ref 0–0.9)
MONOCYTES NFR BLD AUTO: 8.9 % — SIGNIFICANT CHANGE UP (ref 2–14)
NEUTROPHILS # BLD AUTO: 7.22 K/UL — SIGNIFICANT CHANGE UP (ref 1.8–7.4)
NEUTROPHILS NFR BLD AUTO: 77.3 % — HIGH (ref 43–77)
NRBC # BLD: 0 /100 WBCS — SIGNIFICANT CHANGE UP (ref 0–0)
NT-PROBNP SERPL-SCNC: 36 PG/ML — SIGNIFICANT CHANGE UP (ref 0–125)
PLATELET # BLD AUTO: 262 K/UL — SIGNIFICANT CHANGE UP (ref 150–400)
POTASSIUM SERPL-MCNC: 3.7 MMOL/L — SIGNIFICANT CHANGE UP (ref 3.5–5.3)
POTASSIUM SERPL-SCNC: 3.7 MMOL/L — SIGNIFICANT CHANGE UP (ref 3.5–5.3)
PROT SERPL-MCNC: 7 GM/DL — SIGNIFICANT CHANGE UP (ref 6–8.3)
PROTHROM AB SERPL-ACNC: 14.5 SEC — HIGH (ref 10.5–13.4)
RBC # BLD: 4.71 M/UL — SIGNIFICANT CHANGE UP (ref 4.2–5.8)
RBC # FLD: 13.6 % — SIGNIFICANT CHANGE UP (ref 10.3–14.5)
SODIUM SERPL-SCNC: 139 MMOL/L — SIGNIFICANT CHANGE UP (ref 135–145)
WBC # BLD: 9.35 K/UL — SIGNIFICANT CHANGE UP (ref 3.8–10.5)
WBC # FLD AUTO: 9.35 K/UL — SIGNIFICANT CHANGE UP (ref 3.8–10.5)

## 2022-11-06 PROCEDURE — 73701 CT LOWER EXTREMITY W/DYE: CPT | Mod: 26,LT,MA

## 2022-11-06 PROCEDURE — 73562 X-RAY EXAM OF KNEE 3: CPT | Mod: 26,LT

## 2022-11-06 PROCEDURE — 93971 EXTREMITY STUDY: CPT | Mod: 26

## 2022-11-06 PROCEDURE — 99285 EMERGENCY DEPT VISIT HI MDM: CPT

## 2022-11-06 RX ORDER — TETANUS TOXOID, REDUCED DIPHTHERIA TOXOID AND ACELLULAR PERTUSSIS VACCINE, ADSORBED 5; 2.5; 8; 8; 2.5 [IU]/.5ML; [IU]/.5ML; UG/.5ML; UG/.5ML; UG/.5ML
0.5 SUSPENSION INTRAMUSCULAR ONCE
Refills: 0 | Status: COMPLETED | OUTPATIENT
Start: 2022-11-06 | End: 2022-11-06

## 2022-11-06 RX ORDER — SODIUM CHLORIDE 9 MG/ML
1000 INJECTION INTRAMUSCULAR; INTRAVENOUS; SUBCUTANEOUS ONCE
Refills: 0 | Status: COMPLETED | OUTPATIENT
Start: 2022-11-06 | End: 2022-11-06

## 2022-11-06 RX ORDER — CEFUROXIME AXETIL 250 MG
1 TABLET ORAL
Qty: 14 | Refills: 0
Start: 2022-11-06 | End: 2022-11-12

## 2022-11-06 RX ORDER — KETOROLAC TROMETHAMINE 30 MG/ML
15 SYRINGE (ML) INJECTION ONCE
Refills: 0 | Status: DISCONTINUED | OUTPATIENT
Start: 2022-11-06 | End: 2022-11-06

## 2022-11-06 RX ORDER — CEFUROXIME AXETIL 250 MG
500 TABLET ORAL ONCE
Refills: 0 | Status: COMPLETED | OUTPATIENT
Start: 2022-11-06 | End: 2022-11-06

## 2022-11-06 RX ADMIN — Medication 500 MILLIGRAM(S): at 09:40

## 2022-11-06 RX ADMIN — SODIUM CHLORIDE 1000 MILLILITER(S): 9 INJECTION INTRAMUSCULAR; INTRAVENOUS; SUBCUTANEOUS at 07:15

## 2022-11-06 RX ADMIN — Medication 15 MILLIGRAM(S): at 06:30

## 2022-11-06 RX ADMIN — TETANUS TOXOID, REDUCED DIPHTHERIA TOXOID AND ACELLULAR PERTUSSIS VACCINE, ADSORBED 0.5 MILLILITER(S): 5; 2.5; 8; 8; 2.5 SUSPENSION INTRAMUSCULAR at 09:41

## 2022-11-06 RX ADMIN — SODIUM CHLORIDE 1000 MILLILITER(S): 9 INJECTION INTRAMUSCULAR; INTRAVENOUS; SUBCUTANEOUS at 06:30

## 2022-11-06 NOTE — ED PROVIDER NOTE - OBJECTIVE STATEMENT
42M PMHx of HTN presenting with left leg swelling and left knee pain s/p traumatic fall, direct knee impact about 5 days ago. Reports falling down steps at work about 5 days PTA. Landed directly on patella of left knee. A/w mild abrasion to anterior knee. Began having increased swelling to knee progressing diffusely, a/w anterior patella abrasion and bruising to thigh and lower calf. Denies any numbness/tingling, fevers, chills, shortness of breath, coughs, hemoptysis, back pain, hip pain, neck pain, headaches, visual complaints, head trauma, neck trauma, abdominal pain, chest pain, surgical hx. Unknown tetanus status

## 2022-11-06 NOTE — ED PROVIDER NOTE - PROGRESS NOTE DETAILS
Anabell: Pt care signed out to me at change of shift, pending labs / CT + US imaging. CT w/ + hematoma, US negative for DVT. Labs w/o significant abnormalities (DESIRE neg, WBC WNL, ESR 23). On re-eval, resting comfortably, in NAD. Pt ambulatory in ED w/o difficulty. D/w Ortho, no acute intervention. Recommend NSAIDs, RICE. Will update Tetanus, give 1st dose PO abx in ED (prophylactic d/t concern for developing cellulitis). Ace wrap applied to L knee. Stable for d/c home. Given script Cefuroxime. Instructed for close outpatient PCP f/u. Return signs / symptoms d/w pt, mother at length. They understand / agree w/ this plan.

## 2022-11-06 NOTE — ED ADULT NURSE NOTE - OBJECTIVE STATEMENT
patient alert and oriented x4. pt states he fell on his knee last Sunday. pt noted to have healing abrasion to left knee. swelling noted to extend to left ankle. pt able to bear weight. extremity red and warm to touch. denies hx of blood clots, SOB, chest pain. pt placed on cardiac monitor. of note, pt reports hx of heroin use, states he no longer uses drugs. currently on methadone regimen.

## 2022-11-06 NOTE — ED PROVIDER NOTE - PATIENT PORTAL LINK FT
You can access the FollowMyHealth Patient Portal offered by [facility] by registering at the following website: [email or web address]. By joining [facility]’s FollowMyHealth portal, you will also be able to view your health information using other applications (apps) compatible with our system.

## 2022-11-06 NOTE — ED ADULT NURSE REASSESSMENT NOTE - NS ED NURSE REASSESS COMMENT FT1
pt going to CT, pt is axo4, respirations spontaneous and unlabored, Non-pitting edema noted to LLE. Cap refills <2, peripheral pulses +2 and equal bilaterally. Extremity is was to touch.

## 2022-11-06 NOTE — ED PROVIDER NOTE - CROS ED CARDIOVAS ALL NEG
negative...
Quality 431: Preventive Care And Screening: Unhealthy Alcohol Use - Screening: Patient not identified as an unhealthy alcohol user when screened for unhealthy alcohol use using a systematic screening method
Quality 110: Preventive Care And Screening: Influenza Immunization: Influenza Immunization Administered during Influenza season
Quality 226: Preventive Care And Screening: Tobacco Use: Screening And Cessation Intervention: Patient screened for tobacco use and is an ex/non-smoker
Detail Level: Detailed

## 2022-11-06 NOTE — ED PROVIDER NOTE - PHYSICAL EXAMINATION
VITAL SIGNS: I have reviewed nursing notes and confirm.   GEN: Well-developed; well-nourished; in no acute distress. Speaking full sentences.  SKIN: Warm, pink, no rash, no diaphoresis, no cyanosis, well perfused.   HEAD: Normocephalic; atraumatic. No scalp lacerations, no abrasions.  NECK: Supple; non tender.   EYES: Pupils 3mm equal, round, reactive to light and accomodation, conjunctiva and sclera clear. Extra-ocular movements intact bilaterally.  ENT: No nasal discharge; airway clear. Trachea is midline. ORAL: No oropharyngeal exudates or erythema. Normal dentition.  CV: Regular rate and rhythm. S1, S2 normal; no murmurs, gallops, or rubs. No lower extremity pitting edema bilaterally. Capillary refill < 2 seconds throughout. Distal pulses intact 2+ throughout.  RESP: CTA bilaterally. No wheezes, rales, or rhonchi.   ABD: Normal bowel sounds, soft, non-distended, non-tender, no rebound, no guarding, no rigidity, no hepatosplenomegaly. No CVA tenderness bilaterally.  MSK: Normal range of motion and movement of all 4 extremities. No joint or muscular pain throughout. No clubbing. EXCEPT: (+) LEFT LEG diffuse swelling moderate, a/w mild knee pain, no anterior or posterior laxity. Distal pulses 2+, mild ecchymosis to thigh anterior, anterior tib/fib ecchymosis. good rom of left knee. able to bear weight on left leg.  BACK: No thoracolumbar midline or paravertebral tenderness. No step-offs or obvious deformities.  NEURO: Alert & oriented x 3, Grossly unremarkable. Sensory and motor intact throughout. No focal deficits. Gait: Fluid. Normal speech and coordination.   PSYCH: Cooperative, appropriate. VITAL SIGNS: I have reviewed nursing notes and confirm.   GEN: Well-developed; well-nourished; in no acute distress. Speaking full sentences.  SKIN: Warm, pink, no rash, no diaphoresis, no cyanosis, well perfused.   HEAD: Normocephalic; atraumatic. No scalp lacerations, no abrasions.  NECK: Supple; non tender.   EYES: Pupils 3mm equal, round, reactive to light and accomodation, conjunctiva and sclera clear. Extra-ocular movements intact bilaterally.  ENT: No nasal discharge; airway clear. Trachea is midline. ORAL: No oropharyngeal exudates or erythema. Normal dentition.  CV: Regular rate and rhythm. S1, S2 normal; no murmurs, gallops, or rubs. No lower extremity pitting edema bilaterally. Capillary refill < 2 seconds throughout. Distal pulses intact 2+ throughout.  RESP: CTA bilaterally. No wheezes, rales, or rhonchi.   ABD: Normal bowel sounds, soft, non-distended, non-tender, no rebound, no guarding, no rigidity, no hepatosplenomegaly. No CVA tenderness bilaterally.  MSK: Normal range of motion and movement of all 4 extremities. No joint or muscular pain throughout. No clubbing. EXCEPT: (+) LEFT LEG diffuse swelling moderate, a/w mild knee pain, no anterior or posterior laxity. Distal pulses 2+, mild ecchymosis to thigh anterior, anterior tib/fib ecchymosis. good rom of left knee. able to bear weight on left leg. (+) circular 3x3cm scab overlying anterior knee, mild ttp, mild-mod swelling, no purulent drainage, no lymphangitis.  BACK: No thoracolumbar midline or paravertebral tenderness. No step-offs or obvious deformities.  NEURO: Alert & oriented x 3, Grossly unremarkable. Sensory and motor intact throughout. No focal deficits. Gait: Fluid. Normal speech and coordination.   PSYCH: Cooperative, appropriate.

## 2022-11-06 NOTE — ED ADULT TRIAGE NOTE - CHIEF COMPLAINT QUOTE
complaining of swelling and redness on the left lower leg. as per pt he fell 5 days ago dry crusted wound noted on the left knee, redness around the wound and on the lower leg noted warm to touch as well. denies pain.

## 2022-11-06 NOTE — ED PROVIDER NOTE - CLINICAL SUMMARY MEDICAL DECISION MAKING FREE TEXT BOX
Pt presents with LEFT unilateral leg swelling and pain for 5 days s/p traumatic fall. Exam is concerning for left leg swelling, diffusely; VSS. No lymphangitic spread visible. No fluid pockets or fluctuance concerning for abscess noted. Low concern for cellulitis or osteomyelitis. No evidence of phlegmasia cerulea or alba dolens. Focal and unilateral nature not consistent with heart failure.  Workup: US Venous Doppler Lower Extremity, labs, CT leg/knee.    Rx: (choose below)  •Xarelto 15mg PO BID x 21 days (then 20mg PO daily, but should see primary care by then) •Cannot use in renal disease, CrCl <30  •Apixaban 10mg PO BID x 7 days, then 5mg PO BID daily •Okay in renal disease. •Even in severe renal disease probably okay to just give 5mg daily without the initial higher week   •Lovenox should always be weight based, No Cap on dosage. Obese patients should not receive the alternate daily dosing of 1.5mg/kg q24    Disposition: Discharge with appropriate follow up with cardiology. Instructed patient to follow up with primary care physician in next 48 hours as well. Return precautions given.    PEARLS:  •Isolated thromboses of the calf veins (tibial or peroneal) •Don’t treat but re-check faster: repeat ultrasound in 2-5 days •Up to 25% of isolated calf vein thromboses can propagate proximally in hospitalized or postoperative patients. These patients, however, are at a higher risk than ambulatory patients. This still led to the recommendation for repeat ultrasound.  •Negative US study with actually concerning symptoms/history needs follow up study in one week per current teaching (2018)  •There is evidence that if you are comfortable discharging a positive D-dimer and negative DVT study you may save patients the need for follow up if you order both and they are negative •Both negative, equals really clear and no one week follow up  •D-dimer positive + negative US = convincing evidence for patient to follow up.

## 2024-03-26 NOTE — DIETITIAN INITIAL EVALUATION ADULT. - DATE OF WEIGHT PRIOR TO ADM
[FreeTextEntry1] : 6 weeks of R wrist pain, exam consistent with dequervain's. XR normal. Improved today 80% - Thumb spica brace recommended with lifting and at night - HEP and PT referral given - Mobic once daily for pain. Advised trial of not using to see if pain represents - Tylenol and ice as needed - Follow up in 3-4 weeks. If not improved consider CSI which was offered today and declined by patient
12-Mar-2019

## 2024-07-25 NOTE — DIETITIAN INITIAL EVALUATION ADULT. - NS AS NUTRI DX OTHER
Food For Life  Diet Recommendation 1: Diabetes  Diet Recommendation 2: Heart Healthy  Diet Recommendation 3: Healthy Eating  Food Intolerance Avoidance: NKFA  Household Size: 4 Members (Max/[place])  Interventions: Referral Number: 3rd 6 Mo Referral 1.5 yrs (Referrals may not be consecutive)  Interventions: Visit Number: 3 of 6 Visits - Max 6 Visits/Referral Each 6 Mo Period  Education Today: [employer] Meals  Grains: 50-75% Whole  Fruit: 25-50% Fresh  Vegetables: 0-25% Fresh  Proteins: 4 or more Plant-based Items  Dairy: 0-25% Lowfat  [place] [place] [facility] [place]  Initials of RD Assisting Today: [place]    No nutrition diagnosis at this time

## 2024-11-27 ENCOUNTER — NON-APPOINTMENT (OUTPATIENT)
Age: 44
End: 2024-11-27

## 2024-12-17 ENCOUNTER — APPOINTMENT (OUTPATIENT)
Dept: OTOLARYNGOLOGY | Facility: CLINIC | Age: 44
End: 2024-12-17
Payer: MEDICAID

## 2024-12-17 VITALS
SYSTOLIC BLOOD PRESSURE: 130 MMHG | HEART RATE: 80 BPM | HEIGHT: 69 IN | WEIGHT: 206 LBS | DIASTOLIC BLOOD PRESSURE: 81 MMHG | BODY MASS INDEX: 30.51 KG/M2 | OXYGEN SATURATION: 96 %

## 2024-12-17 DIAGNOSIS — H65.91 UNSPECIFIED NONSUPPURATIVE OTITIS MEDIA, RIGHT EAR: ICD-10-CM

## 2024-12-17 DIAGNOSIS — Z78.9 OTHER SPECIFIED HEALTH STATUS: ICD-10-CM

## 2024-12-17 DIAGNOSIS — F17.210 NICOTINE DEPENDENCE, CIGARETTES, UNCOMPLICATED: ICD-10-CM

## 2024-12-17 PROBLEM — Z00.00 ENCOUNTER FOR PREVENTIVE HEALTH EXAMINATION: Status: ACTIVE | Noted: 2024-12-17

## 2024-12-17 PROCEDURE — 99204 OFFICE O/P NEW MOD 45 MIN: CPT | Mod: 25

## 2024-12-17 PROCEDURE — 31237 NSL/SINS NDSC SURG BX POLYPC: CPT | Mod: 50

## 2024-12-17 RX ORDER — BACITRACIN 500 [IU]/G
500 OINTMENT TOPICAL 3 TIMES DAILY
Qty: 1 | Refills: 1 | Status: ACTIVE | COMMUNITY
Start: 2024-12-17 | End: 1900-01-01

## 2024-12-17 RX ORDER — DOXYCYCLINE 100 MG/1
100 TABLET, FILM COATED ORAL
Qty: 28 | Refills: 0 | Status: ACTIVE | COMMUNITY
Start: 2024-12-17 | End: 1900-01-01

## 2024-12-17 RX ORDER — BUDESONIDE 0.5 MG/2ML
0.5 INHALANT ORAL
Qty: 90 | Refills: 3 | Status: ACTIVE | COMMUNITY
Start: 2024-12-17 | End: 1900-01-01

## 2024-12-17 RX ORDER — SOD CHLOR,BICARB/SQUEEZ BOTTLE
PACKET, WITH RINSE DEVICE NASAL
Qty: 100 | Refills: 1 | Status: ACTIVE | COMMUNITY
Start: 2024-12-17 | End: 1900-01-01

## 2024-12-18 PROBLEM — F17.210 SMOKES CIGARETTES: Status: ACTIVE | Noted: 2024-12-17

## 2024-12-18 PROBLEM — Z78.9 CONSUMES ALCOHOL OCCASIONALLY: Status: ACTIVE | Noted: 2024-12-17

## 2024-12-18 PROBLEM — H65.91 FLUID LEVEL BEHIND TYMPANIC MEMBRANE OF RIGHT EAR: Status: ACTIVE | Noted: 2024-12-18

## 2024-12-23 LAB — EAR NOSE AND THROAT CULTURE: ABNORMAL

## 2024-12-26 ENCOUNTER — APPOINTMENT (OUTPATIENT)
Dept: OTOLARYNGOLOGY | Facility: CLINIC | Age: 44
End: 2024-12-26
Payer: MEDICAID

## 2024-12-26 VITALS — DIASTOLIC BLOOD PRESSURE: 80 MMHG | HEART RATE: 93 BPM | TEMPERATURE: 98 F | SYSTOLIC BLOOD PRESSURE: 132 MMHG

## 2024-12-26 DIAGNOSIS — J01.41 ACUTE RECURRENT PANSINUSITIS: ICD-10-CM

## 2024-12-26 PROBLEM — J34.89 NASAL CRUSTING: Status: ACTIVE | Noted: 2024-12-26

## 2024-12-26 PROBLEM — R09.81 CHRONIC NASAL CONGESTION: Status: ACTIVE | Noted: 2024-12-18

## 2024-12-26 PROBLEM — J34.89 NASAL SEPTAL PERFORATION: Status: ACTIVE | Noted: 2024-12-18

## 2024-12-26 PROCEDURE — 31237 NSL/SINS NDSC SURG BX POLYPC: CPT | Mod: 50

## 2024-12-26 PROCEDURE — 99214 OFFICE O/P EST MOD 30 MIN: CPT | Mod: 25

## 2024-12-30 ENCOUNTER — APPOINTMENT (OUTPATIENT)
Dept: OTOLARYNGOLOGY | Facility: CLINIC | Age: 44
End: 2024-12-30
Payer: MEDICAID

## 2024-12-30 ENCOUNTER — NON-APPOINTMENT (OUTPATIENT)
Age: 44
End: 2024-12-30

## 2024-12-30 VITALS
DIASTOLIC BLOOD PRESSURE: 73 MMHG | OXYGEN SATURATION: 99 % | TEMPERATURE: 98 F | HEIGHT: 69 IN | WEIGHT: 206 LBS | BODY MASS INDEX: 30.51 KG/M2 | HEART RATE: 93 BPM | SYSTOLIC BLOOD PRESSURE: 122 MMHG

## 2024-12-30 PROCEDURE — 31231 NASAL ENDOSCOPY DX: CPT

## 2024-12-30 PROCEDURE — 99213 OFFICE O/P EST LOW 20 MIN: CPT | Mod: 25

## 2024-12-31 LAB — CORE LAB BIOPSY: NORMAL

## 2025-01-02 ENCOUNTER — NON-APPOINTMENT (OUTPATIENT)
Age: 45
End: 2025-01-02

## 2025-01-02 LAB — EAR NOSE AND THROAT CULTURE: ABNORMAL

## 2025-01-09 ENCOUNTER — APPOINTMENT (OUTPATIENT)
Dept: OTOLARYNGOLOGY | Facility: CLINIC | Age: 45
End: 2025-01-09
Payer: MEDICAID

## 2025-01-09 VITALS
HEIGHT: 69 IN | DIASTOLIC BLOOD PRESSURE: 81 MMHG | SYSTOLIC BLOOD PRESSURE: 140 MMHG | HEART RATE: 84 BPM | BODY MASS INDEX: 30.51 KG/M2 | WEIGHT: 206 LBS

## 2025-01-09 DIAGNOSIS — J34.89 OTHER SPECIFIED DISORDERS OF NOSE AND NASAL SINUSES: ICD-10-CM

## 2025-01-09 DIAGNOSIS — J01.41 ACUTE RECURRENT PANSINUSITIS: ICD-10-CM

## 2025-01-09 DIAGNOSIS — R09.81 NASAL CONGESTION: ICD-10-CM

## 2025-01-09 PROCEDURE — 31237 NSL/SINS NDSC SURG BX POLYPC: CPT | Mod: 50

## 2025-01-09 PROCEDURE — 99213 OFFICE O/P EST LOW 20 MIN: CPT | Mod: 25

## 2025-01-09 RX ORDER — MUPIROCIN 20 MG/G
2 OINTMENT TOPICAL
Qty: 22 | Refills: 0 | Status: ACTIVE | COMMUNITY
Start: 2025-01-09 | End: 1900-01-01

## 2025-01-09 RX ORDER — BACITRACIN 500 [IU]/G
500 OINTMENT TOPICAL TWICE DAILY
Qty: 1 | Refills: 0 | Status: ACTIVE | COMMUNITY
Start: 2025-01-09 | End: 1900-01-01

## 2025-01-23 ENCOUNTER — APPOINTMENT (OUTPATIENT)
Dept: OTOLARYNGOLOGY | Facility: CLINIC | Age: 45
End: 2025-01-23
Payer: MEDICAID

## 2025-01-23 DIAGNOSIS — J34.89 OTHER SPECIFIED DISORDERS OF NOSE AND NASAL SINUSES: ICD-10-CM

## 2025-01-23 DIAGNOSIS — R09.81 NASAL CONGESTION: ICD-10-CM

## 2025-01-23 DIAGNOSIS — J01.41 ACUTE RECURRENT PANSINUSITIS: ICD-10-CM

## 2025-01-23 PROCEDURE — 31237 NSL/SINS NDSC SURG BX POLYPC: CPT | Mod: 50

## 2025-01-23 PROCEDURE — 99213 OFFICE O/P EST LOW 20 MIN: CPT | Mod: 25

## 2025-02-10 ENCOUNTER — APPOINTMENT (OUTPATIENT)
Dept: OTOLARYNGOLOGY | Facility: CLINIC | Age: 45
End: 2025-02-10
Payer: MEDICAID

## 2025-02-10 DIAGNOSIS — J01.41 ACUTE RECURRENT PANSINUSITIS: ICD-10-CM

## 2025-02-10 PROCEDURE — 31237 NSL/SINS NDSC SURG BX POLYPC: CPT | Mod: 50

## 2025-02-10 PROCEDURE — 99213 OFFICE O/P EST LOW 20 MIN: CPT | Mod: 25

## 2025-02-10 RX ORDER — ELASTIC BANDAGE 1"X2.2YD
2300-700 BANDAGE TOPICAL
Qty: 1 | Refills: 0 | Status: ACTIVE | COMMUNITY
Start: 2025-02-10 | End: 1900-01-01

## 2025-02-10 RX ORDER — BUDESONIDE 3 MG/1
3 CAPSULE, COATED PELLETS ORAL
Qty: 60 | Refills: 2 | Status: ACTIVE | COMMUNITY
Start: 2025-02-10 | End: 1900-01-01

## 2025-02-13 ENCOUNTER — NON-APPOINTMENT (OUTPATIENT)
Age: 45
End: 2025-02-13

## 2025-02-20 ENCOUNTER — APPOINTMENT (OUTPATIENT)
Dept: OTOLARYNGOLOGY | Facility: CLINIC | Age: 45
End: 2025-02-20
Payer: MEDICAID

## 2025-02-20 VITALS — TEMPERATURE: 98 F | DIASTOLIC BLOOD PRESSURE: 82 MMHG | HEART RATE: 81 BPM | SYSTOLIC BLOOD PRESSURE: 135 MMHG

## 2025-02-20 PROCEDURE — 31237 NSL/SINS NDSC SURG BX POLYPC: CPT | Mod: 50

## 2025-02-20 PROCEDURE — 99214 OFFICE O/P EST MOD 30 MIN: CPT | Mod: 25

## 2025-02-20 RX ORDER — AMOXICILLIN AND CLAVULANATE POTASSIUM 875; 125 MG/1; MG/1
875-125 TABLET, COATED ORAL
Qty: 20 | Refills: 0 | Status: ACTIVE | COMMUNITY
Start: 2025-02-20 | End: 1900-01-01

## 2025-02-27 ENCOUNTER — APPOINTMENT (OUTPATIENT)
Dept: OTOLARYNGOLOGY | Facility: CLINIC | Age: 45
End: 2025-02-27
Payer: MEDICAID

## 2025-02-27 PROCEDURE — 31237 NSL/SINS NDSC SURG BX POLYPC: CPT | Mod: 50

## 2025-02-27 PROCEDURE — 99213 OFFICE O/P EST LOW 20 MIN: CPT | Mod: 25

## 2025-03-06 ENCOUNTER — APPOINTMENT (OUTPATIENT)
Dept: OTOLARYNGOLOGY | Facility: CLINIC | Age: 45
End: 2025-03-06
Payer: MEDICAID

## 2025-03-06 PROCEDURE — 31237 NSL/SINS NDSC SURG BX POLYPC: CPT | Mod: 50

## 2025-03-06 PROCEDURE — 99213 OFFICE O/P EST LOW 20 MIN: CPT | Mod: 25

## 2025-03-13 ENCOUNTER — APPOINTMENT (OUTPATIENT)
Dept: OTOLARYNGOLOGY | Facility: CLINIC | Age: 45
End: 2025-03-13
Payer: MEDICAID

## 2025-03-13 VITALS — BODY MASS INDEX: 30.51 KG/M2 | WEIGHT: 206 LBS | HEIGHT: 69 IN

## 2025-03-13 DIAGNOSIS — J34.89 OTHER SPECIFIED DISORDERS OF NOSE AND NASAL SINUSES: ICD-10-CM

## 2025-03-13 DIAGNOSIS — J01.41 ACUTE RECURRENT PANSINUSITIS: ICD-10-CM

## 2025-03-13 DIAGNOSIS — R09.81 NASAL CONGESTION: ICD-10-CM

## 2025-03-13 PROCEDURE — 31237 NSL/SINS NDSC SURG BX POLYPC: CPT | Mod: 50

## 2025-03-13 PROCEDURE — 99213 OFFICE O/P EST LOW 20 MIN: CPT | Mod: 25

## 2025-03-24 ENCOUNTER — APPOINTMENT (OUTPATIENT)
Dept: OTOLARYNGOLOGY | Facility: CLINIC | Age: 45
End: 2025-03-24
Payer: MEDICAID

## 2025-03-24 VITALS — HEART RATE: 98 BPM | DIASTOLIC BLOOD PRESSURE: 78 MMHG | TEMPERATURE: 98 F | SYSTOLIC BLOOD PRESSURE: 131 MMHG

## 2025-03-24 DIAGNOSIS — J34.89 OTHER SPECIFIED DISORDERS OF NOSE AND NASAL SINUSES: ICD-10-CM

## 2025-03-24 DIAGNOSIS — J01.41 ACUTE RECURRENT PANSINUSITIS: ICD-10-CM

## 2025-03-24 DIAGNOSIS — R09.81 NASAL CONGESTION: ICD-10-CM

## 2025-03-24 PROCEDURE — 99213 OFFICE O/P EST LOW 20 MIN: CPT | Mod: 25

## 2025-03-24 PROCEDURE — 31237 NSL/SINS NDSC SURG BX POLYPC: CPT | Mod: 50

## 2025-04-07 ENCOUNTER — APPOINTMENT (OUTPATIENT)
Dept: OTOLARYNGOLOGY | Facility: CLINIC | Age: 45
End: 2025-04-07
Payer: MEDICAID

## 2025-04-07 VITALS — TEMPERATURE: 98 F | SYSTOLIC BLOOD PRESSURE: 144 MMHG | DIASTOLIC BLOOD PRESSURE: 83 MMHG | HEART RATE: 96 BPM

## 2025-04-07 DIAGNOSIS — J01.41 ACUTE RECURRENT PANSINUSITIS: ICD-10-CM

## 2025-04-07 DIAGNOSIS — J34.89 OTHER SPECIFIED DISORDERS OF NOSE AND NASAL SINUSES: ICD-10-CM

## 2025-04-07 DIAGNOSIS — R09.81 NASAL CONGESTION: ICD-10-CM

## 2025-04-07 PROCEDURE — 31237 NSL/SINS NDSC SURG BX POLYPC: CPT | Mod: 50

## 2025-04-07 PROCEDURE — 99214 OFFICE O/P EST MOD 30 MIN: CPT | Mod: 25

## 2025-04-08 ENCOUNTER — NON-APPOINTMENT (OUTPATIENT)
Age: 45
End: 2025-04-08

## 2025-04-10 ENCOUNTER — NON-APPOINTMENT (OUTPATIENT)
Age: 45
End: 2025-04-10

## 2025-04-10 LAB — CORE LAB BIOPSY: NORMAL

## 2025-04-17 ENCOUNTER — NON-APPOINTMENT (OUTPATIENT)
Age: 45
End: 2025-04-17

## 2025-04-17 LAB — ACID FAST STN TISS: NORMAL

## 2025-04-24 ENCOUNTER — NON-APPOINTMENT (OUTPATIENT)
Age: 45
End: 2025-04-24

## 2025-04-28 ENCOUNTER — APPOINTMENT (OUTPATIENT)
Dept: OTOLARYNGOLOGY | Facility: CLINIC | Age: 45
End: 2025-04-28
Payer: MEDICAID

## 2025-04-28 VITALS
WEIGHT: 223 LBS | SYSTOLIC BLOOD PRESSURE: 128 MMHG | HEIGHT: 69 IN | BODY MASS INDEX: 33.03 KG/M2 | DIASTOLIC BLOOD PRESSURE: 82 MMHG | HEART RATE: 96 BPM

## 2025-04-28 PROCEDURE — 31237 NSL/SINS NDSC SURG BX POLYPC: CPT | Mod: 50

## 2025-04-28 PROCEDURE — 99213 OFFICE O/P EST LOW 20 MIN: CPT | Mod: 25

## 2025-04-30 ENCOUNTER — APPOINTMENT (OUTPATIENT)
Dept: OTOLARYNGOLOGY | Facility: CLINIC | Age: 45
End: 2025-04-30

## 2025-05-01 ENCOUNTER — NON-APPOINTMENT (OUTPATIENT)
Age: 45
End: 2025-05-01

## 2025-05-08 ENCOUNTER — APPOINTMENT (OUTPATIENT)
Dept: OTOLARYNGOLOGY | Facility: CLINIC | Age: 45
End: 2025-05-08
Payer: MEDICAID

## 2025-05-08 ENCOUNTER — NON-APPOINTMENT (OUTPATIENT)
Age: 45
End: 2025-05-08

## 2025-05-08 VITALS — HEART RATE: 99 BPM | DIASTOLIC BLOOD PRESSURE: 88 MMHG | TEMPERATURE: 98 F | SYSTOLIC BLOOD PRESSURE: 128 MMHG

## 2025-05-08 PROCEDURE — 99213 OFFICE O/P EST LOW 20 MIN: CPT | Mod: 25

## 2025-05-08 PROCEDURE — 31237 NSL/SINS NDSC SURG BX POLYPC: CPT | Mod: 50

## 2025-05-08 RX ORDER — MUPIROCIN 20 MG/G
2 OINTMENT TOPICAL TWICE DAILY
Qty: 1 | Refills: 3 | Status: ACTIVE | COMMUNITY
Start: 2025-05-08 | End: 1900-01-01

## 2025-05-09 ENCOUNTER — APPOINTMENT (OUTPATIENT)
Dept: OTOLARYNGOLOGY | Facility: CLINIC | Age: 45
End: 2025-05-09
Payer: MEDICAID

## 2025-05-09 VITALS
HEART RATE: 86 BPM | SYSTOLIC BLOOD PRESSURE: 128 MMHG | BODY MASS INDEX: 33.77 KG/M2 | OXYGEN SATURATION: 97 % | WEIGHT: 228 LBS | HEIGHT: 69 IN | DIASTOLIC BLOOD PRESSURE: 85 MMHG

## 2025-05-09 PROCEDURE — 31231 NASAL ENDOSCOPY DX: CPT

## 2025-05-09 PROCEDURE — 99204 OFFICE O/P NEW MOD 45 MIN: CPT | Mod: 25

## 2025-05-15 ENCOUNTER — NON-APPOINTMENT (OUTPATIENT)
Age: 45
End: 2025-05-15

## 2025-05-15 ENCOUNTER — APPOINTMENT (OUTPATIENT)
Dept: OTOLARYNGOLOGY | Facility: CLINIC | Age: 45
End: 2025-05-15

## 2025-05-15 ENCOUNTER — APPOINTMENT (OUTPATIENT)
Dept: OTOLARYNGOLOGY | Facility: CLINIC | Age: 45
End: 2025-05-15
Payer: MEDICAID

## 2025-05-15 VITALS — SYSTOLIC BLOOD PRESSURE: 133 MMHG | TEMPERATURE: 98 F | HEART RATE: 97 BPM | DIASTOLIC BLOOD PRESSURE: 79 MMHG

## 2025-05-15 DIAGNOSIS — J01.41 ACUTE RECURRENT PANSINUSITIS: ICD-10-CM

## 2025-05-15 DIAGNOSIS — J34.89 OTHER SPECIFIED DISORDERS OF NOSE AND NASAL SINUSES: ICD-10-CM

## 2025-05-15 DIAGNOSIS — R09.81 NASAL CONGESTION: ICD-10-CM

## 2025-05-15 PROCEDURE — 99213 OFFICE O/P EST LOW 20 MIN: CPT | Mod: 25

## 2025-05-15 PROCEDURE — 31237 NSL/SINS NDSC SURG BX POLYPC: CPT | Mod: 50

## 2025-05-16 ENCOUNTER — APPOINTMENT (OUTPATIENT)
Age: 45
End: 2025-05-16

## 2025-05-16 PROCEDURE — 99203 OFFICE O/P NEW LOW 30 MIN: CPT

## 2025-05-19 ENCOUNTER — NON-APPOINTMENT (OUTPATIENT)
Age: 45
End: 2025-05-19

## 2025-05-19 LAB — CORE LAB BIOPSY: NORMAL

## 2025-05-27 ENCOUNTER — NON-APPOINTMENT (OUTPATIENT)
Age: 45
End: 2025-05-27

## 2025-05-29 ENCOUNTER — APPOINTMENT (OUTPATIENT)
Dept: OTOLARYNGOLOGY | Facility: CLINIC | Age: 45
End: 2025-05-29
Payer: MEDICAID

## 2025-05-29 VITALS — HEIGHT: 69 IN | WEIGHT: 228 LBS | BODY MASS INDEX: 33.77 KG/M2

## 2025-05-29 PROCEDURE — 31237 NSL/SINS NDSC SURG BX POLYPC: CPT | Mod: 50

## 2025-05-29 PROCEDURE — 99213 OFFICE O/P EST LOW 20 MIN: CPT | Mod: 25

## 2025-05-29 RX ORDER — ELASTIC BANDAGE 1"X2.2YD
2300-700 BANDAGE TOPICAL
Qty: 120 | Refills: 2 | Status: ACTIVE | COMMUNITY
Start: 2025-05-29 | End: 1900-01-01

## 2025-05-30 ENCOUNTER — APPOINTMENT (OUTPATIENT)
Age: 45
End: 2025-05-30
Payer: MEDICAID

## 2025-05-30 PROCEDURE — 99024 POSTOP FOLLOW-UP VISIT: CPT

## 2025-06-05 ENCOUNTER — APPOINTMENT (OUTPATIENT)
Dept: OTOLARYNGOLOGY | Facility: CLINIC | Age: 45
End: 2025-06-05
Payer: MEDICAID

## 2025-06-05 DIAGNOSIS — J01.41 ACUTE RECURRENT PANSINUSITIS: ICD-10-CM

## 2025-06-05 PROBLEM — M27.8: Status: ACTIVE | Noted: 2025-06-05

## 2025-06-05 PROCEDURE — 99213 OFFICE O/P EST LOW 20 MIN: CPT | Mod: 25

## 2025-06-05 PROCEDURE — 31237 NSL/SINS NDSC SURG BX POLYPC: CPT | Mod: 50

## 2025-06-06 ENCOUNTER — APPOINTMENT (OUTPATIENT)
Age: 45
End: 2025-06-06
Payer: MEDICAID

## 2025-06-06 PROCEDURE — NTX: CUSTOM

## 2025-06-09 ENCOUNTER — APPOINTMENT (OUTPATIENT)
Dept: OTOLARYNGOLOGY | Facility: CLINIC | Age: 45
End: 2025-06-09

## 2025-06-12 ENCOUNTER — APPOINTMENT (OUTPATIENT)
Dept: OTOLARYNGOLOGY | Facility: CLINIC | Age: 45
End: 2025-06-12
Payer: MEDICAID

## 2025-06-12 VITALS — TEMPERATURE: 98 F | SYSTOLIC BLOOD PRESSURE: 125 MMHG | DIASTOLIC BLOOD PRESSURE: 76 MMHG | HEART RATE: 93 BPM

## 2025-06-12 PROCEDURE — 31237 NSL/SINS NDSC SURG BX POLYPC: CPT | Mod: 50

## 2025-06-12 PROCEDURE — 99213 OFFICE O/P EST LOW 20 MIN: CPT | Mod: 25

## 2025-06-16 ENCOUNTER — APPOINTMENT (OUTPATIENT)
Dept: RHEUMATOLOGY | Facility: CLINIC | Age: 45
End: 2025-06-16
Payer: MEDICAID

## 2025-06-16 ENCOUNTER — NON-APPOINTMENT (OUTPATIENT)
Age: 45
End: 2025-06-16

## 2025-06-16 VITALS
HEART RATE: 98 BPM | DIASTOLIC BLOOD PRESSURE: 88 MMHG | RESPIRATION RATE: 16 BRPM | BODY MASS INDEX: 33.18 KG/M2 | WEIGHT: 224 LBS | OXYGEN SATURATION: 98 % | TEMPERATURE: 97.6 F | HEIGHT: 69 IN | SYSTOLIC BLOOD PRESSURE: 138 MMHG

## 2025-06-16 PROCEDURE — 99204 OFFICE O/P NEW MOD 45 MIN: CPT

## 2025-06-19 ENCOUNTER — APPOINTMENT (OUTPATIENT)
Dept: OTOLARYNGOLOGY | Facility: CLINIC | Age: 45
End: 2025-06-19
Payer: MEDICAID

## 2025-06-19 VITALS
DIASTOLIC BLOOD PRESSURE: 91 MMHG | HEART RATE: 115 BPM | HEIGHT: 69 IN | SYSTOLIC BLOOD PRESSURE: 147 MMHG | WEIGHT: 224 LBS | BODY MASS INDEX: 33.18 KG/M2

## 2025-06-19 PROCEDURE — 99213 OFFICE O/P EST LOW 20 MIN: CPT | Mod: 25

## 2025-06-19 PROCEDURE — 31237 NSL/SINS NDSC SURG BX POLYPC: CPT | Mod: 50

## 2025-06-26 ENCOUNTER — APPOINTMENT (OUTPATIENT)
Dept: OTOLARYNGOLOGY | Facility: CLINIC | Age: 45
End: 2025-06-26
Payer: MEDICAID

## 2025-06-26 VITALS
DIASTOLIC BLOOD PRESSURE: 84 MMHG | HEIGHT: 69 IN | BODY MASS INDEX: 33.18 KG/M2 | WEIGHT: 224 LBS | SYSTOLIC BLOOD PRESSURE: 133 MMHG | HEART RATE: 95 BPM

## 2025-06-26 PROCEDURE — 99213 OFFICE O/P EST LOW 20 MIN: CPT | Mod: 25

## 2025-06-26 PROCEDURE — 31237 NSL/SINS NDSC SURG BX POLYPC: CPT | Mod: 50

## 2025-06-27 ENCOUNTER — APPOINTMENT (OUTPATIENT)
Dept: OTOLARYNGOLOGY | Facility: CLINIC | Age: 45
End: 2025-06-27

## 2025-07-03 ENCOUNTER — APPOINTMENT (OUTPATIENT)
Dept: OTOLARYNGOLOGY | Facility: CLINIC | Age: 45
End: 2025-07-03
Payer: MEDICAID

## 2025-07-03 VITALS — HEIGHT: 69 IN | WEIGHT: 224 LBS | BODY MASS INDEX: 33.18 KG/M2

## 2025-07-03 PROCEDURE — 31237 NSL/SINS NDSC SURG BX POLYPC: CPT | Mod: 50

## 2025-07-03 PROCEDURE — 99213 OFFICE O/P EST LOW 20 MIN: CPT | Mod: 25

## 2025-07-09 ENCOUNTER — APPOINTMENT (OUTPATIENT)
Dept: OTOLARYNGOLOGY | Facility: CLINIC | Age: 45
End: 2025-07-09

## 2025-07-17 ENCOUNTER — APPOINTMENT (OUTPATIENT)
Dept: OTOLARYNGOLOGY | Facility: CLINIC | Age: 45
End: 2025-07-17
Payer: MEDICAID

## 2025-07-17 VITALS
HEIGHT: 69 IN | DIASTOLIC BLOOD PRESSURE: 84 MMHG | HEART RATE: 95 BPM | WEIGHT: 235 LBS | BODY MASS INDEX: 34.8 KG/M2 | SYSTOLIC BLOOD PRESSURE: 124 MMHG

## 2025-07-17 PROBLEM — J34.0: Status: ACTIVE | Noted: 2025-06-16

## 2025-07-17 PROCEDURE — 99213 OFFICE O/P EST LOW 20 MIN: CPT | Mod: 25

## 2025-07-17 PROCEDURE — 31237 NSL/SINS NDSC SURG BX POLYPC: CPT | Mod: 50

## 2025-07-17 RX ORDER — SODIUM CHLORIDE/ALOE VERA
SPRAY, NON-AEROSOL (ML) NASAL
Qty: 1 | Refills: 2 | Status: ACTIVE | COMMUNITY
Start: 2025-07-17 | End: 1900-01-01

## 2025-07-17 RX ORDER — EUCALYPTUS/PEPPERMINT OIL
SOLUTION, NON-ORAL NASAL
Qty: 1 | Refills: 0 | Status: ACTIVE | COMMUNITY
Start: 2025-07-17 | End: 1900-01-01

## 2025-08-07 ENCOUNTER — APPOINTMENT (OUTPATIENT)
Dept: OTOLARYNGOLOGY | Facility: CLINIC | Age: 45
End: 2025-08-07

## 2025-08-21 ENCOUNTER — APPOINTMENT (OUTPATIENT)
Dept: OTOLARYNGOLOGY | Facility: CLINIC | Age: 45
End: 2025-08-21